# Patient Record
Sex: MALE | Race: WHITE | Employment: OTHER | URBAN - METROPOLITAN AREA
[De-identification: names, ages, dates, MRNs, and addresses within clinical notes are randomized per-mention and may not be internally consistent; named-entity substitution may affect disease eponyms.]

---

## 2017-02-23 ENCOUNTER — HOSPITAL ENCOUNTER (OUTPATIENT)
Dept: LAB | Age: 67
Discharge: HOME | End: 2017-02-23
Payer: COMMERCIAL

## 2017-02-23 DIAGNOSIS — E11.65 TYPE 2 DIABETES MELLITUS WITH HYPERGLYCEMIA (HCC): ICD-10-CM

## 2017-02-23 LAB
ALT SERPL-CCNC: 18 U/L (ref 12–65)
ANION GAP BLD CALC-SCNC: 9 MMOL/L (ref 7–16)
AST SERPL W P-5'-P-CCNC: 1 U/L (ref 15–37)
BUN SERPL-MCNC: 17 MG/DL (ref 8–23)
CALCIUM SERPL-MCNC: 9.4 MG/DL (ref 8.3–10.4)
CHLORIDE SERPL-SCNC: 103 MMOL/L (ref 98–107)
CHOLEST SERPL-MCNC: 302 MG/DL
CO2 SERPL-SCNC: 28 MMOL/L (ref 21–32)
CREAT SERPL-MCNC: 1.12 MG/DL (ref 0.8–1.5)
EST. AVERAGE GLUCOSE BLD GHB EST-MCNC: 278 MG/DL
GLUCOSE SERPL-MCNC: 182 MG/DL (ref 65–100)
HBA1C MFR BLD: 11.3 % (ref 4.8–6)
HDLC SERPL-MCNC: 36 MG/DL (ref 40–60)
HDLC SERPL: 8.4 {RATIO}
LDLC SERPL CALC-MCNC: ABNORMAL MG/DL
LDLC SERPL DIRECT ASSAY-MCNC: 115 MG/DL
LIPID PROFILE,FLP: ABNORMAL
POTASSIUM SERPL-SCNC: 4.4 MMOL/L (ref 3.5–5.1)
SODIUM SERPL-SCNC: 140 MMOL/L (ref 136–145)
TRIGL SERPL-MCNC: 705 MG/DL (ref 35–150)
VLDLC SERPL CALC-MCNC: 141 MG/DL (ref 7–27)

## 2017-02-23 PROCEDURE — 84460 ALANINE AMINO (ALT) (SGPT): CPT | Performed by: INTERNAL MEDICINE

## 2017-02-23 PROCEDURE — 83721 ASSAY OF BLOOD LIPOPROTEIN: CPT | Performed by: INTERNAL MEDICINE

## 2017-02-23 PROCEDURE — 80048 BASIC METABOLIC PNL TOTAL CA: CPT | Performed by: INTERNAL MEDICINE

## 2017-02-23 PROCEDURE — 84450 TRANSFERASE (AST) (SGOT): CPT | Performed by: INTERNAL MEDICINE

## 2017-02-23 PROCEDURE — 83036 HEMOGLOBIN GLYCOSYLATED A1C: CPT | Performed by: INTERNAL MEDICINE

## 2017-02-23 PROCEDURE — 36415 COLL VENOUS BLD VENIPUNCTURE: CPT | Performed by: INTERNAL MEDICINE

## 2017-02-23 PROCEDURE — 80061 LIPID PANEL: CPT | Performed by: INTERNAL MEDICINE

## 2017-06-05 ENCOUNTER — HOSPITAL ENCOUNTER (OUTPATIENT)
Dept: LAB | Age: 67
Discharge: HOME | End: 2017-06-05
Attending: INTERNAL MEDICINE
Payer: COMMERCIAL

## 2017-06-05 DIAGNOSIS — E11.65 TYPE 2 DIABETES MELLITUS WITH HYPERGLYCEMIA, WITH LONG-TERM CURRENT USE OF INSULIN (HCC): ICD-10-CM

## 2017-06-05 DIAGNOSIS — E78.2 ELEVATED TRIGLYCERIDES WITH HIGH CHOLESTEROL: ICD-10-CM

## 2017-06-05 DIAGNOSIS — Z79.4 TYPE 2 DIABETES MELLITUS WITH HYPERGLYCEMIA, WITH LONG-TERM CURRENT USE OF INSULIN (HCC): ICD-10-CM

## 2017-06-05 LAB
ALT SERPL-CCNC: 33 U/L (ref 12–65)
ANION GAP BLD CALC-SCNC: 11 MMOL/L (ref 7–16)
AST SERPL W P-5'-P-CCNC: 16 U/L (ref 15–37)
BUN SERPL-MCNC: 21 MG/DL (ref 8–23)
CALCIUM SERPL-MCNC: 9 MG/DL (ref 8.3–10.4)
CHLORIDE SERPL-SCNC: 99 MMOL/L (ref 98–107)
CHOLEST SERPL-MCNC: 353 MG/DL
CO2 SERPL-SCNC: 29 MMOL/L (ref 21–32)
CREAT SERPL-MCNC: 0.99 MG/DL (ref 0.8–1.5)
EST. AVERAGE GLUCOSE BLD GHB EST-MCNC: 220 MG/DL
GLUCOSE SERPL-MCNC: 209 MG/DL (ref 65–100)
HBA1C MFR BLD: 9.3 % (ref 4.8–6)
HDLC SERPL-MCNC: 43 MG/DL (ref 40–60)
HDLC SERPL: 8.2 {RATIO}
LDLC SERPL CALC-MCNC: ABNORMAL MG/DL
LIPID PROFILE,FLP: ABNORMAL
POTASSIUM SERPL-SCNC: 4.6 MMOL/L (ref 3.5–5.1)
SODIUM SERPL-SCNC: 139 MMOL/L (ref 136–145)
TRIGL SERPL-MCNC: 1091 MG/DL (ref 35–150)
VLDLC SERPL CALC-MCNC: ABNORMAL MG/DL (ref 6–23)

## 2017-06-05 PROCEDURE — 80061 LIPID PANEL: CPT | Performed by: INTERNAL MEDICINE

## 2017-06-05 PROCEDURE — 84450 TRANSFERASE (AST) (SGOT): CPT | Performed by: INTERNAL MEDICINE

## 2017-06-05 PROCEDURE — 83036 HEMOGLOBIN GLYCOSYLATED A1C: CPT | Performed by: INTERNAL MEDICINE

## 2017-06-05 PROCEDURE — 80048 BASIC METABOLIC PNL TOTAL CA: CPT | Performed by: INTERNAL MEDICINE

## 2017-06-05 PROCEDURE — 36415 COLL VENOUS BLD VENIPUNCTURE: CPT | Performed by: INTERNAL MEDICINE

## 2017-06-05 PROCEDURE — 84460 ALANINE AMINO (ALT) (SGPT): CPT | Performed by: INTERNAL MEDICINE

## 2017-06-12 PROBLEM — I15.2 HYPERTENSION DUE TO ENDOCRINE DISORDER: Status: ACTIVE | Noted: 2017-06-12

## 2017-06-12 PROBLEM — E78.2 MIXED DYSLIPIDEMIA: Status: ACTIVE | Noted: 2017-06-12

## 2017-09-06 ENCOUNTER — HOSPITAL ENCOUNTER (OUTPATIENT)
Dept: LAB | Age: 67
Discharge: HOME | End: 2017-09-06
Attending: INTERNAL MEDICINE
Payer: COMMERCIAL

## 2017-09-06 DIAGNOSIS — E11.65 TYPE 2 DIABETES MELLITUS WITH HYPERGLYCEMIA, WITH LONG-TERM CURRENT USE OF INSULIN (HCC): ICD-10-CM

## 2017-09-06 DIAGNOSIS — Z79.4 TYPE 2 DIABETES MELLITUS WITH HYPERGLYCEMIA, WITH LONG-TERM CURRENT USE OF INSULIN (HCC): ICD-10-CM

## 2017-09-06 DIAGNOSIS — E78.2 MIXED DYSLIPIDEMIA: ICD-10-CM

## 2017-09-06 LAB
ALBUMIN SERPL-MCNC: 3.7 G/DL (ref 3.2–4.6)
ALBUMIN/GLOB SERPL: 0.8 {RATIO} (ref 1.2–3.5)
ALP SERPL-CCNC: 75 U/L (ref 50–136)
ALT SERPL-CCNC: 30 U/L (ref 12–65)
ANION GAP SERPL CALC-SCNC: 11 MMOL/L (ref 7–16)
AST SERPL-CCNC: 13 U/L (ref 15–37)
BILIRUB SERPL-MCNC: 0.7 MG/DL (ref 0.2–1.1)
BUN SERPL-MCNC: 18 MG/DL (ref 8–23)
CALCIUM SERPL-MCNC: 9.4 MG/DL (ref 8.3–10.4)
CHLORIDE SERPL-SCNC: 102 MMOL/L (ref 98–107)
CHOLEST SERPL-MCNC: 353 MG/DL
CO2 SERPL-SCNC: 28 MMOL/L (ref 21–32)
CREAT SERPL-MCNC: 1.19 MG/DL (ref 0.8–1.5)
EST. AVERAGE GLUCOSE BLD GHB EST-MCNC: 240 MG/DL
GLOBULIN SER CALC-MCNC: 4.6 G/DL (ref 2.3–3.5)
GLUCOSE SERPL-MCNC: 206 MG/DL (ref 65–100)
HBA1C MFR BLD: 10 % (ref 4.8–6)
HDLC SERPL-MCNC: 38 MG/DL (ref 40–60)
HDLC SERPL: 9.3 {RATIO}
LDLC SERPL CALC-MCNC: ABNORMAL MG/DL
LDLC SERPL DIRECT ASSAY-MCNC: 95 MG/DL
LIPID PROFILE,FLP: ABNORMAL
POTASSIUM SERPL-SCNC: 4.6 MMOL/L (ref 3.5–5.1)
PROT SERPL-MCNC: 8.3 G/DL (ref 6.3–8.2)
SODIUM SERPL-SCNC: 141 MMOL/L (ref 136–145)
TRIGL SERPL-MCNC: 999 MG/DL (ref 35–150)
VLDLC SERPL CALC-MCNC: 199.8 MG/DL (ref 6–23)

## 2017-09-06 PROCEDURE — 36415 COLL VENOUS BLD VENIPUNCTURE: CPT | Performed by: INTERNAL MEDICINE

## 2017-09-06 PROCEDURE — 83721 ASSAY OF BLOOD LIPOPROTEIN: CPT | Performed by: INTERNAL MEDICINE

## 2017-09-06 PROCEDURE — 80053 COMPREHEN METABOLIC PANEL: CPT | Performed by: INTERNAL MEDICINE

## 2017-09-06 PROCEDURE — 83036 HEMOGLOBIN GLYCOSYLATED A1C: CPT | Performed by: INTERNAL MEDICINE

## 2017-09-06 PROCEDURE — 80061 LIPID PANEL: CPT | Performed by: INTERNAL MEDICINE

## 2017-09-25 ENCOUNTER — HOSPITAL ENCOUNTER (OUTPATIENT)
Dept: SLEEP MEDICINE | Age: 67
Discharge: HOME | End: 2017-09-25
Payer: COMMERCIAL

## 2017-09-25 PROCEDURE — 95811 POLYSOM 6/>YRS CPAP 4/> PARM: CPT

## 2017-09-26 PROBLEM — Z12.11 ENCOUNTER FOR SCREENING COLONOSCOPY: Status: ACTIVE | Noted: 2017-09-26

## 2017-10-16 ENCOUNTER — HOSPITAL ENCOUNTER (OUTPATIENT)
Age: 67
Setting detail: OUTPATIENT SURGERY
Discharge: HOME | End: 2017-10-16
Attending: SURGERY | Admitting: SURGERY
Payer: COMMERCIAL

## 2017-10-16 VITALS
OXYGEN SATURATION: 95 % | TEMPERATURE: 97.8 F | DIASTOLIC BLOOD PRESSURE: 77 MMHG | BODY MASS INDEX: 28.93 KG/M2 | HEIGHT: 66 IN | WEIGHT: 180 LBS | RESPIRATION RATE: 16 BRPM | SYSTOLIC BLOOD PRESSURE: 125 MMHG | HEART RATE: 70 BPM

## 2017-10-16 LAB — GLUCOSE BLD STRIP.AUTO-MCNC: 106 MG/DL (ref 65–100)

## 2017-10-16 PROCEDURE — 99153 MOD SED SAME PHYS/QHP EA: CPT | Performed by: SURGERY

## 2017-10-16 PROCEDURE — G0500 MOD SEDAT ENDO SERVICE >5YRS: HCPCS | Performed by: SURGERY

## 2017-10-16 PROCEDURE — 74011250636 HC RX REV CODE- 250/636

## 2017-10-16 PROCEDURE — 76040000019: Performed by: SURGERY

## 2017-10-16 PROCEDURE — 74011250636 HC RX REV CODE- 250/636: Performed by: SURGERY

## 2017-10-16 PROCEDURE — 82962 GLUCOSE BLOOD TEST: CPT

## 2017-10-16 RX ORDER — FLUMAZENIL 0.1 MG/ML
0.2 INJECTION INTRAVENOUS
Status: DISCONTINUED | OUTPATIENT
Start: 2017-10-16 | End: 2017-10-16 | Stop reason: HOSPADM

## 2017-10-16 RX ORDER — SODIUM CHLORIDE 0.9 % (FLUSH) 0.9 %
5-10 SYRINGE (ML) INJECTION EVERY 8 HOURS
Status: DISCONTINUED | OUTPATIENT
Start: 2017-10-16 | End: 2017-10-16 | Stop reason: HOSPADM

## 2017-10-16 RX ORDER — SODIUM CHLORIDE 9 MG/ML
25 INJECTION, SOLUTION INTRAVENOUS CONTINUOUS
Status: DISCONTINUED | OUTPATIENT
Start: 2017-10-16 | End: 2017-10-16 | Stop reason: HOSPADM

## 2017-10-16 RX ORDER — FENTANYL CITRATE 50 UG/ML
50 INJECTION, SOLUTION INTRAMUSCULAR; INTRAVENOUS
Status: DISCONTINUED | OUTPATIENT
Start: 2017-10-16 | End: 2017-10-16 | Stop reason: HOSPADM

## 2017-10-16 RX ORDER — MIDAZOLAM HYDROCHLORIDE 1 MG/ML
.25-5 INJECTION, SOLUTION INTRAMUSCULAR; INTRAVENOUS
Status: DISCONTINUED | OUTPATIENT
Start: 2017-10-16 | End: 2017-10-16 | Stop reason: HOSPADM

## 2017-10-16 RX ORDER — SODIUM CHLORIDE 0.9 % (FLUSH) 0.9 %
5-10 SYRINGE (ML) INJECTION AS NEEDED
Status: DISCONTINUED | OUTPATIENT
Start: 2017-10-16 | End: 2017-10-16 | Stop reason: HOSPADM

## 2017-10-16 RX ORDER — NALOXONE HYDROCHLORIDE 0.4 MG/ML
0.2 INJECTION, SOLUTION INTRAMUSCULAR; INTRAVENOUS; SUBCUTANEOUS
Status: DISCONTINUED | OUTPATIENT
Start: 2017-10-16 | End: 2017-10-16 | Stop reason: HOSPADM

## 2017-10-16 RX ADMIN — FENTANYL CITRATE 50 MCG: 50 INJECTION, SOLUTION INTRAMUSCULAR; INTRAVENOUS at 08:07

## 2017-10-16 RX ADMIN — MIDAZOLAM HYDROCHLORIDE 1 MG: 1 INJECTION, SOLUTION INTRAMUSCULAR; INTRAVENOUS at 08:07

## 2017-10-16 RX ADMIN — MIDAZOLAM HYDROCHLORIDE 2 MG: 1 INJECTION, SOLUTION INTRAMUSCULAR; INTRAVENOUS at 08:04

## 2017-10-16 RX ADMIN — SODIUM CHLORIDE 25 ML/HR: 900 INJECTION, SOLUTION INTRAVENOUS at 07:37

## 2017-10-16 RX ADMIN — FENTANYL CITRATE 50 MCG: 50 INJECTION, SOLUTION INTRAMUSCULAR; INTRAVENOUS at 08:05

## 2017-10-16 NOTE — DISCHARGE INSTRUCTIONS
Gastrointestinal Colonoscopy/Flexible Sigmoidoscopy - Lower Exam Discharge Instructions  1. Call Dr. Harry Hernandez for any problems or questions. 2. Contact the doctors office for follow up appointment as directed  3. Medication may cause drowsiness for several hours, therefore, do not drive or operate machinery for remainder of the day. 4. No alcohol today. 5. Ordinarily, you may resume regular diet and activity after exam unless otherwise specified by your physician. 6. Because of air put into your colon during exam, you may experience some abdominal distension, relieved by the passage of gas, for several hours. 7. Contact your physician if you have any of the following:  a. Excessive amount of bleeding - large amount when having a bowel movement. Occasional specks of blood with bowel movement would not be unusual.  b. Severe abdominal pain  c. Fever or Chills    Any additional instructions:  Repeat in 10 years. Instructions given to Alicia Ponce and other family members.   Instructions given by:  Dr. Harry Hernandez

## 2017-10-16 NOTE — IP AVS SNAPSHOT
[address] 
[phone] Patient: Leo Lawson MRN: [id number] TGO:[id number] You are allergic to the following No active allergies Recent Documentation Height Weight BMI Smoking Status 1.676 m 81.6 kg 29.05 kg/m2 Never Smoker Emergency Contacts Name Discharge Info Relation Home Work Mobile Cammy Enamorado  Spouse [3] [phone] About your hospitalization You were admitted on:  October 16, 2017 You last received care in the:  [facility] You were discharged on:  October 16, 2017 Unit phone number:  [phone] Why you were hospitalized Your primary diagnosis was:  Not on File Providers Seen During Your Hospitalizations Provider Role Specialty Primary office phone Mack De La Vega MD Attending Provider General Surgery [phone] Your Primary Care Physician (PCP) Primary Care Physician Office Phone Office Fax Evan Contehus [phone] Follow-up Information None Your Appointments Friday October 27, 2017  9:40 AM EDT New Patient PSG with PP SLEEP ANEUDY Medeiros [address] ([facility]) [address] [place] [address]  
[phone] Current Discharge Medication List  
  
ASK your doctor about these medications Dose & Instructions Dispensing Information Comments Morning Noon Evening Bedtime  
 aspirin delayed-release 81 mg tablet Your last dose was: Your next dose is: Take  by mouth daily. Refills:  0  
     
   
   
   
  
 empagliflozin 25 mg tablet Commonly known as:  Domingo An Your last dose was: Your next dose is:    
   
   
 Dose:  25 mg Take 1 Tab by mouth daily. Quantity:  30 Tab Refills:  5 fenofibrate micronized 134 mg capsule Commonly known as:  LOFIBRA Your last dose was: Your next dose is:    
   
   
 Dose:  134 mg Take 1 Cap by mouth every morning. Quantity:  90 Cap Refills:  3  
     
   
   
   
  
 glucose blood VI test strips strip Commonly known as:  Yoan Altman Your last dose was: Your next dose is:    
   
   
 #100 per box, 3 boxes/3 month supply, Check BS TID, E11.65 Quantity:  300 Strip Refills:  3  
     
   
   
   
  
 insulin aspart 100 unit/mL Inpn Commonly known as:  Yoselyn Juares Your last dose was: Your next dose is:    
   
   
 5 units prior to large meals as directed  Indications: Hyperglycemia Quantity:  3 Pen Refills:  5  
     
   
   
   
  
 insulin degludec 200 unit/mL (3 mL) Inpn Commonly known as:  Alexander Rogerson U-200 Your last dose was: Your next dose is:    
   
   
 Dose:  40 Units 40 Units by SubCUTAneous route daily. Quantity:  50 Units Refills:  5 Insulin Needles (Disposable) 32 gauge x 5/32\" Ndle Your last dose was: Your next dose is:    
   
   
 Dose:  100 Each  
100 Each by Does Not Apply route five (5) times daily. e11.9 Quantity:  400 Each Refills:  3 Lancets Misc Commonly known as:  ONETOUCH ULTRASOFT LANCETS Your last dose was: Your next dose is:    
   
   
 Check blood sugar twice daily, E11.9 Quantity:  100 Each Refills:  5  
     
   
   
   
  
 levothyroxine 112 mcg tablet Commonly known as:  SYNTHROID Your last dose was: Your next dose is:    
   
   
 Dose:  0.5 mcg Take 0.5 Tabs by mouth Daily (before breakfast). Quantity:  90 Tab Refills:  3  
     
   
   
   
  
 losartan 50 mg tablet Commonly known as:  COZAAR Your last dose was: Your next dose is:    
   
   
 Dose:  50 mg Take 1 Tab by mouth daily. Quantity:  30 Tab Refills:  5  
     
   
   
   
  
 metoprolol succinate 50 mg XL tablet Commonly known as:  TOPROL-XL Your last dose was: Your next dose is:    
   
   
 Dose:  50 mg Take 1 Tab by mouth daily. Quantity:  90 Tab Refills:  3  
     
   
   
   
  
 omega-3 acid ethyl esters 1 gram capsule Commonly known as:  Leanor Nichol Your last dose was: Your next dose is:    
   
   
 Dose:  2 g Take 2 Caps by mouth daily. Indications: hypertriglyceridemia Quantity:  180 Cap Refills:  1  
     
   
   
   
  
 omeprazole 20 mg capsule Commonly known as:  PRILOSEC Your last dose was: Your next dose is:    
   
   
 Dose:  20 mg Take 1 Cap by mouth daily. Quantity:  90 Cap Refills:  3  
     
   
   
   
  
 pravastatin 40 mg tablet Commonly known as:  PRAVACHOL Your last dose was: Your next dose is:    
   
   
 Dose:  40 mg Take 1 Tab by mouth nightly. Quantity:  90 Tab Refills:  3 Discharge Instructions Gastrointestinal Colonoscopy/Flexible Sigmoidoscopy - Lower Exam Discharge Instructions 1. Call Dr. Lisa Trejo for any problems or questions. 2. Contact the doctors office for follow up appointment as directed 3. Medication may cause drowsiness for several hours, therefore, do not drive or operate machinery for remainder of the day. 4. No alcohol today. 5. Ordinarily, you may resume regular diet and activity after exam unless otherwise specified by your physician. 6. Because of air put into your colon during exam, you may experience some abdominal distension, relieved by the passage of gas, for several hours. 7. Contact your physician if you have any of the following: 
a. Excessive amount of bleeding  large amount when having a bowel movement. Occasional specks of blood with bowel movement would not be unusual. 
b. Severe abdominal pain 
c. Fever or Chills Any additional instructions:  Repeat in 10 years. Instructions given to Sheree Ma and other family members. Instructions given by:  Dr. FISCHER [facility] Discharge Orders None Introducing [facility]! Dear Simón Knox: Thank you for requesting a A[employer]ame account. Our records indicate that you already have an active Adsame account. You can access your account anytime at [email or web address] Did you know that you can access your hospital and ER discharge instructions at any time in Adsame? You can also review all of your test results from your hospital stay or ER visit. Additional Information If you have questions, please visit the Frequently Asked Questions section of the Adsame website at [email or web address]/. Remember, Adsame is NOT to be used for urgent needs. For medical emergencies, dial 911. Now available from your iPhone and Android! General Information Please provide this summary of care documentation to your next provider. Patient Signature:  ____________________________________________________________ Date:  ____________________________________________________________  
  
Earnstine Hi Provider Signature:  ____________________________________________________________ Date:  ____________________________________________________________

## 2017-10-16 NOTE — ROUTINE PROCESS
Discharge instructions given to wife. IV discontinued with skin c/d/i. Pt passed flatus and tolerating liquids well. Pt ready for discharge.

## 2017-10-16 NOTE — PROCEDURES
[address] [facility]  [phone]  Colonoscopy Report  Daron Salas    Admit date: 10/16/2017    MRN: [id number]     : 1950     Age: 78 y.o.          10/16/2017 8:20 AM    Screening colonoscopy     Post Procedure Diagnosis: sigmoid diverticulosis, otherwise normal colon    Indications:  Pt was sent by their primary care physician for screening colonoscopy. Risks and benefits of the procedure were discussed, and consent was obtained. Procedure:  Pt was brought back to the endoscopy suite, placed in left lateral decubitus position, and IV sedation was administered. 3mg versed and 100mcg fentnyl were administered intravenously, and vital signs were monitored continuously during this time. Digital rectal exam was performed with no findings. Colonoscope was inserted into the anus, and advanced with insufflation around the entire length of the colon to the ileocecal valve. The valve was positively identified with the visual landmarks and palpation in the RLQ. The scope was then withdrawn slowly, inspecting the mucosa for any irregularities. Findings included sigmoid diverticulosis. Colon was otherwise normal .  Insufflation was suctioned out at the completion of the procedure, and the patient was transferred to the recovery area in stable condition. There were no complications. Pt tolerated the procedure well. Recommend repeat exam in 10 years      Specimens none    Estimated Blood Loss: 0-minimum.     Alex Almanzar MD

## 2017-10-16 NOTE — H&P
Harveysloan Camilo    10/16/2017      Subjective:     Patient is a 78 y.o. male who was sent by their primary care physician for screening colonoscopy. Pt denies any symptoms of abdominal pain, change in bowel habits, blood per rectum, unexplained weight loss, or other symptoms that would be of concern for colon cancer. Patient Active Problem List    Diagnosis Date Noted    Encounter for screening colonoscopy 2017    Hypertension due to endocrine disorder 2017    Mixed dyslipidemia 2017    Acquired hypothyroidism 2016    Gastroesophageal reflux disease 2016    Elevated triglycerides with high cholesterol 2016    Hypertension     Type 2 diabetes mellitus with hyperglycemia ([facility] [address] 10/28/2015    Shortness of breath 10/28/2015     Past Medical History:   Diagnosis Date    Acquired hypothyroidism 2016    Dehydration 2009    CAMERON (dyspnea on exertion) 10/28/2015    SOB    Fatigue     other    Fever 2009    Hyperlipidemia     Hypertension     essential    Joint pain, elbow 10/28/2015    Other ill-defined conditions(799.89)     cholesterol    Type 2 diabetes mellitus with hyperglycemia ([facility] [address] 10/28/2015      Past Surgical History:   Procedure Laterality Date    HX HEENT      tonsilectomy      Prior to Admission Medications   Prescriptions Last Dose Informant Patient Reported? Taking? Insulin Needles, Disposable, 32 gauge x \" ndle   No No   Si Each by Does Not Apply route five (5) times daily. e11.9   Lancets (ONETOUCH ULTRASOFT LANCETS) misc   No No   Sig: Check blood sugar twice daily, E11.9   aspirin delayed-release 81 mg tablet 10/15/2017 at Unknown time  Yes Yes   Sig: Take  by mouth daily. empagliflozin (JARDIANCE) 25 mg tablet 10/15/2017 at Unknown time  No Yes   Sig: Take 1 Tab by mouth daily. fenofibrate micronized (LOFIBRA) 134 mg capsule 10/15/2017 at Unknown time  No Yes   Sig: Take 1 Cap by mouth every morning.    glucose blood VI test strips (ONETOUCH VERIO) strip   No No   Sig: #100 per box, 3 boxes/3 month supply, Check BS TID, E11.65   insulin aspart (NOVOLOG) 100 unit/mL inpn 10/15/2017 at Unknown time  No Yes   Si units prior to large meals as directed  Indications: Hyperglycemia   Patient taking differently: 10 units prior to large meals as directed  Indications: Hyperglycemia   insulin degludec (TRESIBA FLEXTOUCH U-200) 200 unit/mL (3 mL) inpn 10/15/2017 at Unknown time  No Yes   Si Units by SubCUTAneous route daily. Patient taking differently: 60 Units by SubCUTAneous route daily. levothyroxine (SYNTHROID) 112 mcg tablet 10/15/2017 at Unknown time  No Yes   Sig: Take 0.5 Tabs by mouth Daily (before breakfast). losartan (COZAAR) 50 mg tablet 10/15/2017 at Unknown time  No Yes   Sig: Take 1 Tab by mouth daily. metoprolol succinate (TOPROL-XL) 50 mg XL tablet 10/15/2017 at Unknown time  No Yes   Sig: Take 1 Tab by mouth daily. omega-3 acid ethyl esters (LOVAZA) 1 gram capsule 10/15/2017 at Unknown time  No Yes   Sig: Take 2 Caps by mouth daily. Indications: hypertriglyceridemia   omeprazole (PRILOSEC) 20 mg capsule 10/15/2017 at Unknown time  No Yes   Sig: Take 1 Cap by mouth daily. pravastatin (PRAVACHOL) 40 mg tablet 10/15/2017 at Unknown time  No Yes   Sig: Take 1 Tab by mouth nightly. Facility-Administered Medications: None     No Known Allergies   Social History   Substance Use Topics    Smoking status: Never Smoker    Smokeless tobacco: Never Used    Alcohol use No      Social History     Social History Narrative     Family History   Problem Relation Age of Onset    Heart Disease Father         No current facility-administered medications for this encounter. Review of Systems  A comprehensive review of systems was negative except for that written in the HPI.     Objective:     Vitals:    10/16/17 0707   Temp: 97.8 °F (36.6 °C)   Weight: 180 lb (81.6 kg)   Height: 5' 6\" (1.676 m)     PHYSICAL EXAM     Gen- the patient is well developed and in no acute distress  HEENT- PERRL, EOMI, no scleral icterus       nose without alar flaring or epistaxis                  oral muscosa moist without cyanosis  Neck- no JVD or retractions  Lungs-clear  Heart- RRR without M,G,R  Abd- soft and non-tender; with positive bowel sounds. Ext- warm without cyanosis. There is no lower leg edema. Skin- no jaundice or rashes, no wounds   Neuro- alert and oriented x 3. No gross sensorimotor deficits are present. Plan:     Age appropriate screening colonoscopy. I have discussed the risks, benefits and alternatives of surgery. Pt understands and wishes to proceed.   Consent obtained           Tremayne Ng MD

## 2017-10-17 ENCOUNTER — PATIENT OUTREACH (OUTPATIENT)
Dept: OTHER | Age: 67
End: 2017-10-17

## 2017-10-17 NOTE — PROGRESS NOTES
Patient on report as eligible for Case Management. Left discreet message on voicemail with this CM contact information. Will attempt to contact again to offer [phone] Management services.

## 2017-10-17 NOTE — PROGRESS NOTES
Transition Of Care Note    Patient discharged from [address] [place] [address] for colonoscopy. Medical History:     Past Medical History:   Diagnosis Date    Acquired hypothyroidism 2016    Dehydration 2009    CAMERON (dyspnea on exertion) 10/28/2015    SOB    Fatigue     other    Fever 2009    Hyperlipidemia     Hypertension     essential    Joint pain, elbow 10/28/2015    Other ill-defined conditions(799.89)     cholesterol    Type 2 diabetes mellitus with hyperglycemia ([facility] [address] 10/28/2015       Patient returned my call. Verified  and zip code with patient as identifiers. Performed post hospital discharge assessment. Provided introduction to self, and explanation of the Nurse Care Manager role. Patient states he is doing well today, been out fishing in his pond. Denies any nausea, vomiting, pain or diarrhea today. Appetite is good and he is eating his normal diet. Medication:   Performed medication reconciliation with patient, and patient verbalizes understanding of administration of home medications. There were no barriers to obtaining medications identified at this time. Support system:  patient    Discharge Instructions :  Reviewed discharge instructions with patient. Patient verbalizes understanding of discharge instructions and follow-up care. Red Flags:  Call your doctor now or seek immediate medical care if:  · Your stools are black and tarlike. · Your stools have streaks of blood, but you did not have a biopsy or any polyps removed. · You have belly pain, or your belly is swollen and firm. · You vomit. · You have a fever. · You are very dizzy.       Advance Care Planning:   Patient was offered the opportunity to discuss advance care planning:  yes     Does patient have an Advance Directive:  yes   If no, did you provide information on Caring Connections?  no     PCP/Specialist follow up: Patient scheduled to follow up with Bon Delatorre MD on  month.  Reviewed red flags with patient, and patient verbalizes understanding. Patient given an opportunity to ask questions. No other clinical/social/functional needs noted. The patient agrees to contact the PCP office for questions related to their healthcare. The patient expressed thanks, offered no additional questions and ended the call.

## 2017-10-24 ENCOUNTER — HOSPITAL ENCOUNTER (OUTPATIENT)
Dept: GENERAL RADIOLOGY | Age: 67
Discharge: HOME | End: 2017-10-24
Attending: INTERNAL MEDICINE
Payer: COMMERCIAL

## 2017-10-24 DIAGNOSIS — R06.09 DOE (DYSPNEA ON EXERTION): ICD-10-CM

## 2017-10-24 PROCEDURE — 71020 XR CHEST PA LAT: CPT

## 2017-10-26 ENCOUNTER — PATIENT OUTREACH (OUTPATIENT)
Dept: OTHER | Age: 67
End: 2017-10-26

## 2017-10-26 NOTE — PROGRESS NOTES
Care Manager contacted the patient by telephone in follow up. Verified  and zip code with patient as identifiers. Patient states he is back to his usual activities. Denies any problems with his bowels at this time. States his appetite is good and is eating his usual foods. Denies any additional needs or concerns at this time. Will follow up in two weeks, and if no identified or requested CM needs, will close this episode of ELMO.

## 2017-10-27 PROBLEM — G47.33 OSA (OBSTRUCTIVE SLEEP APNEA): Status: ACTIVE | Noted: 2017-10-27

## 2017-10-27 PROBLEM — R09.02 HYPOXIA: Status: ACTIVE | Noted: 2017-10-27

## 2017-10-27 PROBLEM — G47.61 PERIODIC LIMB MOVEMENT DISORDER: Status: ACTIVE | Noted: 2017-10-27

## 2017-10-27 PROBLEM — G47.10 HYPERSOMNIA: Status: ACTIVE | Noted: 2017-10-27

## 2017-11-15 ENCOUNTER — PATIENT OUTREACH (OUTPATIENT)
Dept: OTHER | Age: 67
End: 2017-11-15

## 2017-11-15 NOTE — PROGRESS NOTES
Resolving current episode of care. Transitions of care complete. No further ED/UC or hospital admissions within 30 days post discharge. Goal met as there was no return of symptoms. Patient attended follow-up appointments as directed. No outreach from patient to [address] [place] [address].

## 2017-12-06 ENCOUNTER — HOSPITAL ENCOUNTER (OUTPATIENT)
Dept: LAB | Age: 67
Discharge: HOME | End: 2017-12-06
Attending: INTERNAL MEDICINE
Payer: COMMERCIAL

## 2017-12-06 DIAGNOSIS — Z12.5 PROSTATE CANCER SCREENING: ICD-10-CM

## 2017-12-06 DIAGNOSIS — Z79.4 TYPE 2 DIABETES MELLITUS WITH HYPERGLYCEMIA, WITH LONG-TERM CURRENT USE OF INSULIN (HCC): ICD-10-CM

## 2017-12-06 DIAGNOSIS — E11.65 TYPE 2 DIABETES MELLITUS WITH HYPERGLYCEMIA, WITH LONG-TERM CURRENT USE OF INSULIN (HCC): ICD-10-CM

## 2017-12-06 DIAGNOSIS — E78.2 ELEVATED TRIGLYCERIDES WITH HIGH CHOLESTEROL: ICD-10-CM

## 2017-12-06 LAB
ALBUMIN SERPL-MCNC: 3.8 G/DL (ref 3.2–4.6)
ALBUMIN/GLOB SERPL: 0.9 {RATIO} (ref 1.2–3.5)
ALP SERPL-CCNC: 55 U/L (ref 50–136)
ALT SERPL-CCNC: 32 U/L (ref 12–65)
ANION GAP SERPL CALC-SCNC: 11 MMOL/L (ref 7–16)
AST SERPL-CCNC: 21 U/L (ref 15–37)
BASOPHILS # BLD: 0 K/UL (ref 0–0.2)
BASOPHILS NFR BLD: 0 % (ref 0–2)
BILIRUB SERPL-MCNC: 0.4 MG/DL (ref 0.2–1.1)
BUN SERPL-MCNC: 24 MG/DL (ref 8–23)
CALCIUM SERPL-MCNC: 9.5 MG/DL (ref 8.3–10.4)
CHLORIDE SERPL-SCNC: 102 MMOL/L (ref 98–107)
CHOLEST SERPL-MCNC: 261 MG/DL
CO2 SERPL-SCNC: 29 MMOL/L (ref 21–32)
CREAT SERPL-MCNC: 1.18 MG/DL (ref 0.8–1.5)
CREAT UR-MCNC: 103 MG/DL
DIFFERENTIAL METHOD BLD: NORMAL
EOSINOPHIL # BLD: 0.1 K/UL (ref 0–0.8)
EOSINOPHIL NFR BLD: 2 % (ref 0.5–7.8)
ERYTHROCYTE [DISTWIDTH] IN BLOOD BY AUTOMATED COUNT: 13.2 % (ref 11.9–14.6)
EST. AVERAGE GLUCOSE BLD GHB EST-MCNC: 232 MG/DL
GLOBULIN SER CALC-MCNC: 4.2 G/DL (ref 2.3–3.5)
GLUCOSE SERPL-MCNC: 129 MG/DL (ref 65–100)
HBA1C MFR BLD: 9.7 % (ref 4.8–6)
HCT VFR BLD AUTO: 45.1 % (ref 41.1–50.3)
HDLC SERPL-MCNC: 40 MG/DL (ref 40–60)
HDLC SERPL: 6.5 {RATIO}
HGB BLD-MCNC: 14.4 G/DL (ref 13.6–17.2)
IMM GRANULOCYTES # BLD: 0 K/UL (ref 0–0.5)
IMM GRANULOCYTES NFR BLD AUTO: 0 % (ref 0–5)
LDLC SERPL CALC-MCNC: 159.2 MG/DL
LIPID PROFILE,FLP: ABNORMAL
LYMPHOCYTES # BLD: 2.1 K/UL (ref 0.5–4.6)
LYMPHOCYTES NFR BLD: 36 % (ref 13–44)
MCH RBC QN AUTO: 28.1 PG (ref 26.1–32.9)
MCHC RBC AUTO-ENTMCNC: 31.9 G/DL (ref 31.4–35)
MCV RBC AUTO: 88.1 FL (ref 79.6–97.8)
MICROALBUMIN UR-MCNC: 0.54 MG/DL
MICROALBUMIN/CREAT UR-RTO: 5 MG/G
MONOCYTES # BLD: 0.4 K/UL (ref 0.1–1.3)
MONOCYTES NFR BLD: 7 % (ref 4–12)
NEUTS SEG # BLD: 3.3 K/UL (ref 1.7–8.2)
NEUTS SEG NFR BLD: 55 % (ref 43–78)
PLATELET # BLD AUTO: 237 K/UL (ref 150–450)
PMV BLD AUTO: 11.1 FL (ref 10.8–14.1)
POTASSIUM SERPL-SCNC: 4.1 MMOL/L (ref 3.5–5.1)
PROT SERPL-MCNC: 8 G/DL (ref 6.3–8.2)
PSA SERPL-MCNC: 0.5 NG/ML
RBC # BLD AUTO: 5.12 M/UL (ref 4.23–5.67)
SODIUM SERPL-SCNC: 142 MMOL/L (ref 136–145)
TRIGL SERPL-MCNC: 309 MG/DL (ref 35–150)
TSH SERPL DL<=0.005 MIU/L-ACNC: 1.54 UIU/ML (ref 0.36–3.74)
VLDLC SERPL CALC-MCNC: 61.8 MG/DL (ref 6–23)
WBC # BLD AUTO: 5.9 K/UL (ref 4.3–11.1)

## 2017-12-06 PROCEDURE — 36415 COLL VENOUS BLD VENIPUNCTURE: CPT | Performed by: INTERNAL MEDICINE

## 2017-12-06 PROCEDURE — 82043 UR ALBUMIN QUANTITATIVE: CPT | Performed by: INTERNAL MEDICINE

## 2017-12-06 PROCEDURE — 80053 COMPREHEN METABOLIC PANEL: CPT | Performed by: INTERNAL MEDICINE

## 2017-12-06 PROCEDURE — 84443 ASSAY THYROID STIM HORMONE: CPT | Performed by: INTERNAL MEDICINE

## 2017-12-06 PROCEDURE — 85025 COMPLETE CBC W/AUTO DIFF WBC: CPT | Performed by: INTERNAL MEDICINE

## 2017-12-06 PROCEDURE — 83036 HEMOGLOBIN GLYCOSYLATED A1C: CPT | Performed by: INTERNAL MEDICINE

## 2017-12-06 PROCEDURE — 80061 LIPID PANEL: CPT | Performed by: INTERNAL MEDICINE

## 2017-12-06 PROCEDURE — 84153 ASSAY OF PSA TOTAL: CPT | Performed by: INTERNAL MEDICINE

## 2017-12-11 PROBLEM — R06.09 DOE (DYSPNEA ON EXERTION): Status: RESOLVED | Noted: 2017-12-11 | Resolved: 2017-12-11

## 2017-12-11 PROBLEM — R06.09 DOE (DYSPNEA ON EXERTION): Status: ACTIVE | Noted: 2017-12-11

## 2018-03-05 ENCOUNTER — HOSPITAL ENCOUNTER (OUTPATIENT)
Dept: LAB | Age: 68
Discharge: HOME | End: 2018-03-05
Attending: INTERNAL MEDICINE
Payer: COMMERCIAL

## 2018-03-05 LAB
ALBUMIN SERPL-MCNC: 3.9 G/DL (ref 3.2–4.6)
ALBUMIN/GLOB SERPL: 1.1 {RATIO} (ref 1.2–3.5)
ALP SERPL-CCNC: 58 U/L (ref 50–136)
ALT SERPL-CCNC: 38 U/L (ref 12–65)
ANION GAP SERPL CALC-SCNC: 11 MMOL/L (ref 7–16)
AST SERPL-CCNC: 24 U/L (ref 15–37)
BASOPHILS # BLD: 0 K/UL (ref 0–0.2)
BASOPHILS NFR BLD: 1 % (ref 0–2)
BILIRUB SERPL-MCNC: 0.4 MG/DL (ref 0.2–1.1)
BUN SERPL-MCNC: 20 MG/DL (ref 8–23)
CALCIUM SERPL-MCNC: 9.6 MG/DL (ref 8.3–10.4)
CHLORIDE SERPL-SCNC: 105 MMOL/L (ref 98–107)
CHOLEST SERPL-MCNC: 344 MG/DL
CO2 SERPL-SCNC: 27 MMOL/L (ref 21–32)
CREAT SERPL-MCNC: 1.12 MG/DL (ref 0.8–1.5)
DIFFERENTIAL METHOD BLD: NORMAL
EOSINOPHIL # BLD: 0.2 K/UL (ref 0–0.8)
EOSINOPHIL NFR BLD: 3 % (ref 0.5–7.8)
ERYTHROCYTE [DISTWIDTH] IN BLOOD BY AUTOMATED COUNT: 13.4 % (ref 11.9–14.6)
EST. AVERAGE GLUCOSE BLD GHB EST-MCNC: 255 MG/DL
GLOBULIN SER CALC-MCNC: 3.5 G/DL (ref 2.3–3.5)
GLUCOSE SERPL-MCNC: 127 MG/DL (ref 65–100)
HBA1C MFR BLD: 10.5 % (ref 4.8–6)
HCT VFR BLD AUTO: 43.8 % (ref 41.1–50.3)
HDLC SERPL-MCNC: 40 MG/DL (ref 40–60)
HDLC SERPL: 8.6 {RATIO}
HGB BLD-MCNC: 14.5 G/DL (ref 13.6–17.2)
IMM GRANULOCYTES # BLD: 0 K/UL (ref 0–0.5)
IMM GRANULOCYTES NFR BLD AUTO: 0 % (ref 0–5)
LDLC SERPL CALC-MCNC: ABNORMAL MG/DL
LDLC SERPL DIRECT ASSAY-MCNC: 105 MG/DL
LIPID PROFILE,FLP: ABNORMAL
LYMPHOCYTES # BLD: 2.1 K/UL (ref 0.5–4.6)
LYMPHOCYTES NFR BLD: 39 % (ref 13–44)
MCH RBC QN AUTO: 28.2 PG (ref 26.1–32.9)
MCHC RBC AUTO-ENTMCNC: 33.1 G/DL (ref 31.4–35)
MCV RBC AUTO: 85.2 FL (ref 79.6–97.8)
MONOCYTES # BLD: 0.4 K/UL (ref 0.1–1.3)
MONOCYTES NFR BLD: 8 % (ref 4–12)
NEUTS SEG # BLD: 2.7 K/UL (ref 1.7–8.2)
NEUTS SEG NFR BLD: 49 % (ref 43–78)
PLATELET # BLD AUTO: 204 K/UL (ref 150–450)
PMV BLD AUTO: 11.6 FL (ref 10.8–14.1)
POTASSIUM SERPL-SCNC: 4.8 MMOL/L (ref 3.5–5.1)
PROT SERPL-MCNC: 7.4 G/DL (ref 6.3–8.2)
RBC # BLD AUTO: 5.14 M/UL (ref 4.23–5.67)
SODIUM SERPL-SCNC: 143 MMOL/L (ref 136–145)
TRIGL SERPL-MCNC: 896 MG/DL (ref 35–150)
TSH SERPL DL<=0.005 MIU/L-ACNC: 1.51 UIU/ML (ref 0.36–3.74)
VLDLC SERPL CALC-MCNC: 179.2 MG/DL (ref 6–23)
WBC # BLD AUTO: 5.4 K/UL (ref 4.3–11.1)

## 2018-03-05 PROCEDURE — 84443 ASSAY THYROID STIM HORMONE: CPT | Performed by: INTERNAL MEDICINE

## 2018-03-05 PROCEDURE — 80053 COMPREHEN METABOLIC PANEL: CPT | Performed by: INTERNAL MEDICINE

## 2018-03-05 PROCEDURE — 83721 ASSAY OF BLOOD LIPOPROTEIN: CPT | Performed by: INTERNAL MEDICINE

## 2018-03-05 PROCEDURE — 80061 LIPID PANEL: CPT | Performed by: INTERNAL MEDICINE

## 2018-03-05 PROCEDURE — 85025 COMPLETE CBC W/AUTO DIFF WBC: CPT | Performed by: INTERNAL MEDICINE

## 2018-03-05 PROCEDURE — 83036 HEMOGLOBIN GLYCOSYLATED A1C: CPT | Performed by: INTERNAL MEDICINE

## 2018-03-05 PROCEDURE — 36415 COLL VENOUS BLD VENIPUNCTURE: CPT | Performed by: INTERNAL MEDICINE

## 2018-06-08 ENCOUNTER — HOSPITAL ENCOUNTER (OUTPATIENT)
Dept: LAB | Age: 68
Discharge: HOME | End: 2018-06-08
Attending: INTERNAL MEDICINE
Payer: COMMERCIAL

## 2018-06-08 DIAGNOSIS — Z79.4 TYPE 2 DIABETES MELLITUS WITH HYPERGLYCEMIA, WITH LONG-TERM CURRENT USE OF INSULIN (HCC): ICD-10-CM

## 2018-06-08 DIAGNOSIS — Z12.5 PROSTATE CANCER SCREENING: ICD-10-CM

## 2018-06-08 DIAGNOSIS — E11.65 TYPE 2 DIABETES MELLITUS WITH HYPERGLYCEMIA, WITH LONG-TERM CURRENT USE OF INSULIN (HCC): ICD-10-CM

## 2018-06-08 DIAGNOSIS — E78.2 ELEVATED TRIGLYCERIDES WITH HIGH CHOLESTEROL: ICD-10-CM

## 2018-06-08 LAB
ALBUMIN SERPL-MCNC: 3.7 G/DL (ref 3.2–4.6)
ALBUMIN/GLOB SERPL: 1.1 {RATIO} (ref 1.2–3.5)
ALP SERPL-CCNC: 49 U/L (ref 50–136)
ALT SERPL-CCNC: 36 U/L (ref 12–65)
ANION GAP SERPL CALC-SCNC: 7 MMOL/L (ref 7–16)
AST SERPL-CCNC: 22 U/L (ref 15–37)
BASOPHILS # BLD: 0 K/UL (ref 0–0.2)
BASOPHILS NFR BLD: 0 % (ref 0–2)
BILIRUB SERPL-MCNC: 0.5 MG/DL (ref 0.2–1.1)
BUN SERPL-MCNC: 26 MG/DL (ref 8–23)
CALCIUM SERPL-MCNC: 8.9 MG/DL (ref 8.3–10.4)
CHLORIDE SERPL-SCNC: 103 MMOL/L (ref 98–107)
CHOLEST SERPL-MCNC: 325 MG/DL
CO2 SERPL-SCNC: 28 MMOL/L (ref 21–32)
CREAT SERPL-MCNC: 1.19 MG/DL (ref 0.8–1.5)
CREAT UR-MCNC: 100 MG/DL
DIFFERENTIAL METHOD BLD: NORMAL
EOSINOPHIL # BLD: 0.1 K/UL (ref 0–0.8)
EOSINOPHIL NFR BLD: 3 % (ref 0.5–7.8)
ERYTHROCYTE [DISTWIDTH] IN BLOOD BY AUTOMATED COUNT: 13.2 % (ref 11.9–14.6)
EST. AVERAGE GLUCOSE BLD GHB EST-MCNC: 243 MG/DL
GLOBULIN SER CALC-MCNC: 3.4 G/DL (ref 2.3–3.5)
GLUCOSE SERPL-MCNC: 142 MG/DL (ref 65–100)
HBA1C MFR BLD: 10.1 % (ref 4.8–6)
HCT VFR BLD AUTO: 43.3 % (ref 41.1–50.3)
HDLC SERPL-MCNC: 37 MG/DL (ref 40–60)
HDLC SERPL: 8.8 {RATIO}
HGB BLD-MCNC: 14.4 G/DL (ref 13.6–17.2)
IMM GRANULOCYTES # BLD: 0 K/UL (ref 0–0.5)
IMM GRANULOCYTES NFR BLD AUTO: 0 % (ref 0–5)
LDLC SERPL CALC-MCNC: ABNORMAL MG/DL
LDLC SERPL DIRECT ASSAY-MCNC: 104 MG/DL
LIPID PROFILE,FLP: ABNORMAL
LYMPHOCYTES # BLD: 2 K/UL (ref 0.5–4.6)
LYMPHOCYTES NFR BLD: 42 % (ref 13–44)
MCH RBC QN AUTO: 28.1 PG (ref 26.1–32.9)
MCHC RBC AUTO-ENTMCNC: 33.3 G/DL (ref 31.4–35)
MCV RBC AUTO: 84.6 FL (ref 79.6–97.8)
MICROALBUMIN UR-MCNC: 0.91 MG/DL
MICROALBUMIN/CREAT UR-RTO: 9 MG/G
MONOCYTES # BLD: 0.4 K/UL (ref 0.1–1.3)
MONOCYTES NFR BLD: 9 % (ref 4–12)
NEUTS SEG # BLD: 2.2 K/UL (ref 1.7–8.2)
NEUTS SEG NFR BLD: 46 % (ref 43–78)
PLATELET # BLD AUTO: 188 K/UL (ref 150–450)
PMV BLD AUTO: 11.2 FL (ref 10.8–14.1)
POTASSIUM SERPL-SCNC: 4.5 MMOL/L (ref 3.5–5.1)
PROT SERPL-MCNC: 7.1 G/DL (ref 6.3–8.2)
RBC # BLD AUTO: 5.12 M/UL (ref 4.23–5.67)
SODIUM SERPL-SCNC: 138 MMOL/L (ref 136–145)
TRIGL SERPL-MCNC: 952 MG/DL (ref 35–150)
TSH SERPL DL<=0.005 MIU/L-ACNC: 1.56 UIU/ML (ref 0.36–3.74)
VLDLC SERPL CALC-MCNC: 190.4 MG/DL (ref 6–23)
WBC # BLD AUTO: 4.8 K/UL (ref 4.3–11.1)

## 2018-06-08 PROCEDURE — 82043 UR ALBUMIN QUANTITATIVE: CPT | Performed by: INTERNAL MEDICINE

## 2018-06-08 PROCEDURE — 80061 LIPID PANEL: CPT | Performed by: INTERNAL MEDICINE

## 2018-06-08 PROCEDURE — 83721 ASSAY OF BLOOD LIPOPROTEIN: CPT | Performed by: INTERNAL MEDICINE

## 2018-06-08 PROCEDURE — 84443 ASSAY THYROID STIM HORMONE: CPT | Performed by: INTERNAL MEDICINE

## 2018-06-08 PROCEDURE — 83036 HEMOGLOBIN GLYCOSYLATED A1C: CPT | Performed by: INTERNAL MEDICINE

## 2018-06-08 PROCEDURE — 85025 COMPLETE CBC W/AUTO DIFF WBC: CPT | Performed by: INTERNAL MEDICINE

## 2018-06-08 PROCEDURE — 80053 COMPREHEN METABOLIC PANEL: CPT | Performed by: INTERNAL MEDICINE

## 2018-06-08 PROCEDURE — 36415 COLL VENOUS BLD VENIPUNCTURE: CPT | Performed by: INTERNAL MEDICINE

## 2018-10-25 ENCOUNTER — HOSPITAL ENCOUNTER (OUTPATIENT)
Dept: LAB | Age: 68
Discharge: HOME | End: 2018-10-25
Attending: PHYSICIAN ASSISTANT
Payer: COMMERCIAL

## 2018-10-25 LAB
ANION GAP SERPL CALC-SCNC: 9 MMOL/L
BUN SERPL-MCNC: 25 MG/DL (ref 8–23)
CALCIUM SERPL-MCNC: 9.1 MG/DL (ref 8.3–10.4)
CHLORIDE SERPL-SCNC: 105 MMOL/L (ref 98–107)
CO2 SERPL-SCNC: 25 MMOL/L (ref 21–32)
CREAT SERPL-MCNC: 1.2 MG/DL (ref 0.8–1.5)
GLUCOSE SERPL-MCNC: 148 MG/DL (ref 65–100)
MAGNESIUM SERPL-MCNC: 2.1 MG/DL (ref 1.8–2.4)
POTASSIUM SERPL-SCNC: 4.4 MMOL/L (ref 3.5–5.1)
SODIUM SERPL-SCNC: 139 MMOL/L (ref 136–145)

## 2018-10-25 PROCEDURE — 80048 BASIC METABOLIC PNL TOTAL CA: CPT

## 2018-10-25 PROCEDURE — 36415 COLL VENOUS BLD VENIPUNCTURE: CPT

## 2018-10-25 PROCEDURE — 83735 ASSAY OF MAGNESIUM: CPT

## 2018-10-25 NOTE — PROGRESS NOTES
All electrolytes (including magnesium) look fine. I want him to contact me Monday or Tuesday next week after being off Onglyza over the weekend to see if any of his symptoms improve or resolve.

## 2018-11-21 ENCOUNTER — HOSPITAL ENCOUNTER (OUTPATIENT)
Dept: LAB | Age: 68
Discharge: HOME | End: 2018-11-21
Attending: PHYSICIAN ASSISTANT
Payer: COMMERCIAL

## 2018-11-21 LAB
EST. AVERAGE GLUCOSE BLD GHB EST-MCNC: 186 MG/DL
HBA1C MFR BLD: 8.1 %

## 2018-11-21 PROCEDURE — 36415 COLL VENOUS BLD VENIPUNCTURE: CPT

## 2018-11-21 PROCEDURE — 83036 HEMOGLOBIN GLYCOSYLATED A1C: CPT

## 2018-11-21 NOTE — PROGRESS NOTES
Further progress made from 8.9% in September. Patient notified of results. I feel confident and hopeful that with the consistency on current medication regimen that we can hopefully see something < 7.5% at next full 3 month cycle.

## 2019-03-07 ENCOUNTER — HOSPITAL ENCOUNTER (OUTPATIENT)
Dept: LAB | Age: 69
Discharge: HOME | End: 2019-03-07
Payer: COMMERCIAL

## 2019-03-07 DIAGNOSIS — E03.9 ACQUIRED HYPOTHYROIDISM: ICD-10-CM

## 2019-03-07 DIAGNOSIS — Z79.4 UNCONTROLLED TYPE 2 DIABETES MELLITUS WITH HYPERGLYCEMIA, WITH LONG-TERM CURRENT USE OF INSULIN (HCC): ICD-10-CM

## 2019-03-07 DIAGNOSIS — I10 ESSENTIAL HYPERTENSION: ICD-10-CM

## 2019-03-07 DIAGNOSIS — E78.2 MIXED DYSLIPIDEMIA: ICD-10-CM

## 2019-03-07 DIAGNOSIS — K21.9 GASTROESOPHAGEAL REFLUX DISEASE, ESOPHAGITIS PRESENCE NOT SPECIFIED: ICD-10-CM

## 2019-03-07 DIAGNOSIS — E11.65 UNCONTROLLED TYPE 2 DIABETES MELLITUS WITH HYPERGLYCEMIA, WITH LONG-TERM CURRENT USE OF INSULIN (HCC): ICD-10-CM

## 2019-03-07 LAB
ALBUMIN SERPL-MCNC: 3.8 G/DL (ref 3.2–4.6)
ALBUMIN/GLOB SERPL: 1.1 {RATIO}
ALP SERPL-CCNC: 42 U/L (ref 50–136)
ALT SERPL-CCNC: 44 U/L (ref 12–65)
ANION GAP SERPL CALC-SCNC: 5 MMOL/L
AST SERPL-CCNC: 25 U/L (ref 15–37)
BASOPHILS # BLD: 0 K/UL (ref 0–0.2)
BASOPHILS NFR BLD: 1 % (ref 0–2)
BILIRUB SERPL-MCNC: 0.4 MG/DL (ref 0.2–1.1)
BUN SERPL-MCNC: 18 MG/DL (ref 8–23)
CALCIUM SERPL-MCNC: 9.5 MG/DL (ref 8.3–10.4)
CHLORIDE SERPL-SCNC: 106 MMOL/L (ref 98–107)
CHOLEST SERPL-MCNC: 208 MG/DL
CO2 SERPL-SCNC: 28 MMOL/L (ref 21–32)
CREAT SERPL-MCNC: 0.94 MG/DL (ref 0.8–1.5)
DIFFERENTIAL METHOD BLD: NORMAL
EOSINOPHIL # BLD: 0.2 K/UL (ref 0–0.8)
EOSINOPHIL NFR BLD: 3 % (ref 0.5–7.8)
ERYTHROCYTE [DISTWIDTH] IN BLOOD BY AUTOMATED COUNT: 12.6 % (ref 11.9–14.6)
GLOBULIN SER CALC-MCNC: 3.4 G/DL (ref 2.3–3.5)
GLUCOSE SERPL-MCNC: 162 MG/DL (ref 65–100)
HCT VFR BLD AUTO: 41.9 % (ref 41.1–50.3)
HDLC SERPL-MCNC: 45 MG/DL (ref 40–60)
HDLC SERPL: 4.6 {RATIO}
HGB BLD-MCNC: 13.6 G/DL (ref 13.6–17.2)
IMM GRANULOCYTES # BLD AUTO: 0 K/UL (ref 0–0.5)
IMM GRANULOCYTES NFR BLD AUTO: 0 % (ref 0–5)
LDLC SERPL CALC-MCNC: 135.8 MG/DL
LIPID PROFILE,FLP: ABNORMAL
LYMPHOCYTES # BLD: 2 K/UL (ref 0.5–4.6)
LYMPHOCYTES NFR BLD: 39 % (ref 13–44)
MCH RBC QN AUTO: 28.7 PG (ref 26.1–32.9)
MCHC RBC AUTO-ENTMCNC: 32.5 G/DL (ref 31.4–35)
MCV RBC AUTO: 88.4 FL (ref 79.6–97.8)
MONOCYTES # BLD: 0.4 K/UL (ref 0.1–1.3)
MONOCYTES NFR BLD: 8 % (ref 4–12)
NEUTS SEG # BLD: 2.5 K/UL (ref 1.7–8.2)
NEUTS SEG NFR BLD: 49 % (ref 43–78)
NRBC # BLD: 0 K/UL (ref 0–0.2)
PLATELET # BLD AUTO: 200 K/UL (ref 150–450)
PMV BLD AUTO: 10.6 FL (ref 9.4–12.3)
POTASSIUM SERPL-SCNC: 4.4 MMOL/L (ref 3.5–5.1)
PROT SERPL-MCNC: 7.2 G/DL
RBC # BLD AUTO: 4.74 M/UL (ref 4.23–5.6)
SODIUM SERPL-SCNC: 139 MMOL/L (ref 136–145)
TRIGL SERPL-MCNC: 136 MG/DL (ref 35–150)
TSH SERPL DL<=0.005 MIU/L-ACNC: 1.52 UIU/ML
VLDLC SERPL CALC-MCNC: 27.2 MG/DL (ref 6–23)
WBC # BLD AUTO: 5.1 K/UL (ref 4.3–11.1)

## 2019-03-07 PROCEDURE — 80061 LIPID PANEL: CPT

## 2019-03-07 PROCEDURE — 36415 COLL VENOUS BLD VENIPUNCTURE: CPT

## 2019-03-07 PROCEDURE — 85025 COMPLETE CBC W/AUTO DIFF WBC: CPT

## 2019-03-07 PROCEDURE — 80053 COMPREHEN METABOLIC PANEL: CPT

## 2019-03-07 PROCEDURE — 84443 ASSAY THYROID STIM HORMONE: CPT

## 2019-03-07 NOTE — PROGRESS NOTES
Cholesterol levels are REMARKABLY better with higher dose of Lovaza and presumed improved glycemic control!!! Blood sugar remains a bit elevated. Kidney & liver function are normal.  Thyroid function is normal.  Blood counts are normal.  Results will be discussed with patient during upcoming office visit.

## 2019-05-13 PROBLEM — G56.02 CARPAL TUNNEL SYNDROME OF LEFT WRIST: Status: ACTIVE | Noted: 2019-05-13

## 2019-05-13 PROBLEM — G56.22 ENTRAPMENT OF LEFT ULNAR NERVE: Status: ACTIVE | Noted: 2019-05-13

## 2019-06-06 ENCOUNTER — TELEPHONE (OUTPATIENT)
Dept: PHARMACY | Age: 69
End: 2019-06-06

## 2019-06-06 NOTE — TELEPHONE ENCOUNTER
Terryann Goltz, MD, patient will experience changes to his formulary after July 1, 2019. Patient reports he has an upcoming office visit in June and would like to discuss switching to lower cost alternatives. Analisa Tafoya: This medication is moving from Tier 2 ($30 for 30ds, $60 for 90ds) to Tier 3 ($75 for 30ds, $150 for 90ds). Tier 2 options for basal insulin include: Toujeo/Toujeo Max [employer] and [employer]. Novolog: This medication is moving from Tier 2 ($30 for 30ds, $60 for 90ds) to Tier 3 ($75 for 30ds, $150 for 90ds). Tier 1 option for bolus insulin include: Humalog U-100 Kwikpen ($12 for 30ds, $24 for 90ds)    Tier 2 options for SGLT2 include: Franciso Forth, and Invokana as well as their metformin combination products (Synjardy, Xigduo, and Invokamet) ($30 for 30ds, $60 for 90ds)    Please let me know if you have any other questions about the new formulary. Thank you,  Rod Kwong, PharmD  [address] [facility] [place] Pharmacist  [phone] (Option 7)  =======================================================  CLINICAL PHARMACY CONSULT: MEDICATION CONVERSION INITIATIVE    Rachelle Cui is a 80 y.o. male referred to clinical pharmacy specialist - identified with current prescription for Tresiba U-[address]. Starting July 2019, the prescribed medication is going to moving from tier 2 to tier 3. For ministry and patient cost savings, a conversion has been identified to [employer] or Lantus. · Patient's current copay is $30 for 22ds. · If patient stays on Tresiba without copay card, the copay will increase to $75 for 30ds or $150 for 90ds.   · If switched to Lantus or Toujeo Max without copay card, the copay will be $30 for 30ds or $60 for 90ds  · Patient does not qualify for coupon cards due to being Medicare eligible (age > 73 yo)    Patient is also on Novolog U-100 insulin pen:  · If patient stays on Novolog without copay card, the copay will increase to $75 for 30ds or $150 for 90ds. · If switched to Humalog U-100 insulin pen, the copay will be $12 for 30ds or $24 for 90ds    PLAN:  - Medications:   Consider changing from [place] 80 units daily to [employer] and from Novolog SSI to Humalog    - Labs/follow up:  Per provider discretion     Ange Cole, PharmD  Clinical Pharmacist  [phone] (Option 7)  ======================================================  Spoke to patient and wife Joseluis Linsey). Reviewed above. Patient verbalizes understanding.  - Patient and wife confirm patient is taking [place] 80 units daily, but said he is using Novolog as a sliding scale (unsure how much he is actually using). Seems open to changing to less expensive options as patient does not qualify for coupon cards. - Reports upcoming OV in June with Dr. Claudia Peterson although I do not see any on the appointment tab. - Patient and wife asked if I would send formulary information to Dr. Claudia Peterson to help aid the decision making process at upcoming appointment. - Patient's wife asked if I would send the same information to [email or web address] for their records.

## 2019-06-25 PROBLEM — Z91.14 NON COMPLIANCE W MEDICATION REGIMEN: Status: ACTIVE | Noted: 2019-06-25

## 2019-06-25 PROBLEM — R09.02 HYPOXIA: Status: RESOLVED | Noted: 2017-10-27 | Resolved: 2019-06-25

## 2019-09-06 ENCOUNTER — HOSPITAL ENCOUNTER (OUTPATIENT)
Dept: CT IMAGING | Age: 69
Discharge: HOME | End: 2019-09-06
Attending: INTERNAL MEDICINE
Payer: COMMERCIAL

## 2019-09-06 DIAGNOSIS — R10.30 LOWER ABDOMINAL PAIN: ICD-10-CM

## 2019-09-06 DIAGNOSIS — R35.0 URINARY FREQUENCY: ICD-10-CM

## 2019-09-06 LAB — CREAT BLD-MCNC: 1.4 MG/DL (ref 0.8–1.5)

## 2019-09-06 PROCEDURE — 74011636320 HC RX REV CODE- 636/320: Performed by: INTERNAL MEDICINE

## 2019-09-06 PROCEDURE — 74177 CT ABD & PELVIS W/CONTRAST: CPT

## 2019-09-06 PROCEDURE — 74011000258 HC RX REV CODE- 258: Performed by: INTERNAL MEDICINE

## 2019-09-06 PROCEDURE — 82565 ASSAY OF CREATININE: CPT

## 2019-09-06 RX ORDER — SODIUM CHLORIDE 0.9 % (FLUSH) 0.9 %
10 SYRINGE (ML) INJECTION
Status: COMPLETED | OUTPATIENT
Start: 2019-09-06 | End: 2019-09-06

## 2019-09-06 RX ADMIN — SODIUM CHLORIDE 100 ML: 900 INJECTION, SOLUTION INTRAVENOUS at 15:17

## 2019-09-06 RX ADMIN — IOPAMIDOL 100 ML: 755 INJECTION, SOLUTION INTRAVENOUS at 15:16

## 2019-09-06 RX ADMIN — Medication 10 ML: at 15:16

## 2019-09-06 RX ADMIN — DIATRIZOATE MEGLUMINE AND DIATRIZOATE SODIUM 15 ML: 660; 100 LIQUID ORAL; RECTAL at 15:17

## 2019-09-24 PROBLEM — Z12.11 ENCOUNTER FOR SCREENING COLONOSCOPY: Status: RESOLVED | Noted: 2017-09-26 | Resolved: 2019-09-24

## 2019-10-02 ENCOUNTER — HOSPITAL ENCOUNTER (OUTPATIENT)
Dept: LAB | Age: 69
Discharge: HOME | End: 2019-10-02
Payer: COMMERCIAL

## 2019-10-02 DIAGNOSIS — E11.65 UNCONTROLLED TYPE 2 DIABETES MELLITUS WITH HYPERGLYCEMIA, WITH LONG-TERM CURRENT USE OF INSULIN (HCC): ICD-10-CM

## 2019-10-02 DIAGNOSIS — E78.2 MIXED DYSLIPIDEMIA: ICD-10-CM

## 2019-10-02 DIAGNOSIS — E03.9 ACQUIRED HYPOTHYROIDISM: ICD-10-CM

## 2019-10-02 DIAGNOSIS — Z79.4 UNCONTROLLED TYPE 2 DIABETES MELLITUS WITH HYPERGLYCEMIA, WITH LONG-TERM CURRENT USE OF INSULIN (HCC): ICD-10-CM

## 2019-10-02 LAB
ALBUMIN SERPL-MCNC: 3.7 G/DL (ref 3.2–4.6)
ALBUMIN/GLOB SERPL: 1.1 {RATIO} (ref 1.2–3.5)
ALP SERPL-CCNC: 39 U/L (ref 50–136)
ALT SERPL-CCNC: 50 U/L (ref 12–65)
ANION GAP SERPL CALC-SCNC: 7 MMOL/L (ref 7–16)
AST SERPL-CCNC: 26 U/L (ref 15–37)
BILIRUB SERPL-MCNC: 0.4 MG/DL (ref 0.2–1.1)
BUN SERPL-MCNC: 24 MG/DL (ref 8–23)
CALCIUM SERPL-MCNC: 9.5 MG/DL (ref 8.3–10.4)
CHLORIDE SERPL-SCNC: 102 MMOL/L (ref 98–107)
CHOLEST SERPL-MCNC: 320 MG/DL
CO2 SERPL-SCNC: 27 MMOL/L (ref 21–32)
CREAT SERPL-MCNC: 1.34 MG/DL (ref 0.8–1.5)
GLOBULIN SER CALC-MCNC: 3.5 G/DL (ref 2.3–3.5)
GLUCOSE SERPL-MCNC: 364 MG/DL (ref 65–100)
HDLC SERPL-MCNC: 33 MG/DL (ref 40–60)
HDLC SERPL: 9.7 {RATIO}
LDLC SERPL CALC-MCNC: ABNORMAL MG/DL
LDLC SERPL DIRECT ASSAY-MCNC: 134 MG/DL
LIPID PROFILE,FLP: ABNORMAL
POTASSIUM SERPL-SCNC: 4.7 MMOL/L (ref 3.5–5.1)
PROT SERPL-MCNC: 7.2 G/DL (ref 6.3–8.2)
SODIUM SERPL-SCNC: 136 MMOL/L (ref 136–145)
TRIGL SERPL-MCNC: 1020 MG/DL (ref 35–150)
TSH SERPL DL<=0.005 MIU/L-ACNC: 1.7 UIU/ML
VLDLC SERPL CALC-MCNC: 204 MG/DL (ref 6–23)

## 2019-10-02 PROCEDURE — 36415 COLL VENOUS BLD VENIPUNCTURE: CPT

## 2019-10-02 PROCEDURE — 84443 ASSAY THYROID STIM HORMONE: CPT

## 2019-10-02 PROCEDURE — 80053 COMPREHEN METABOLIC PANEL: CPT

## 2019-10-02 PROCEDURE — 80061 LIPID PANEL: CPT

## 2019-10-02 PROCEDURE — 83721 ASSAY OF BLOOD LIPOPROTEIN: CPT

## 2019-10-04 NOTE — PROGRESS NOTES
Triglycerides are back off the charts again - will need to evaluate both diet and compliance with meds! LDL is also much too high given diabetes history. Blood sugar has increased further! Kidney & liver function are normal.  Thyroid function is well controlled with current replacement. Results will be discussed with patient during upcoming office visit.

## 2019-12-10 ENCOUNTER — HOSPITAL ENCOUNTER (OUTPATIENT)
Dept: GENERAL RADIOLOGY | Age: 69
Discharge: HOME | End: 2019-12-10
Attending: PHYSICIAN ASSISTANT
Payer: COMMERCIAL

## 2019-12-10 DIAGNOSIS — M25.511 ACUTE PAIN OF RIGHT SHOULDER: ICD-10-CM

## 2019-12-10 PROCEDURE — 73030 X-RAY EXAM OF SHOULDER: CPT

## 2020-02-14 ENCOUNTER — HOSPITAL ENCOUNTER (OUTPATIENT)
Dept: LAB | Age: 70
Discharge: HOME | End: 2020-02-14
Payer: MEDICARE

## 2020-02-14 LAB
ALBUMIN SERPL-MCNC: 3.7 G/DL (ref 3.2–4.6)
ALBUMIN/GLOB SERPL: 1.1 {RATIO} (ref 1.2–3.5)
ALP SERPL-CCNC: 34 U/L (ref 50–136)
ALT SERPL-CCNC: 43 U/L (ref 12–65)
ANION GAP SERPL CALC-SCNC: 6 MMOL/L (ref 7–16)
AST SERPL-CCNC: 22 U/L (ref 15–37)
BILIRUB SERPL-MCNC: 0.5 MG/DL (ref 0.2–1.1)
BUN SERPL-MCNC: 24 MG/DL (ref 8–23)
CALCIUM SERPL-MCNC: 9 MG/DL (ref 8.3–10.4)
CHLORIDE SERPL-SCNC: 108 MMOL/L (ref 98–107)
CHOLEST SERPL-MCNC: 159 MG/DL
CO2 SERPL-SCNC: 28 MMOL/L (ref 21–32)
CREAT SERPL-MCNC: 1.05 MG/DL (ref 0.8–1.5)
GLOBULIN SER CALC-MCNC: 3.5 G/DL (ref 2.3–3.5)
GLUCOSE SERPL-MCNC: 88 MG/DL (ref 65–100)
HDLC SERPL-MCNC: 54 MG/DL (ref 40–60)
HDLC SERPL: 2.9 {RATIO}
LDLC SERPL CALC-MCNC: 86.4 MG/DL
LIPID PROFILE,FLP: NORMAL
POTASSIUM SERPL-SCNC: 4 MMOL/L (ref 3.5–5.1)
PROT SERPL-MCNC: 7.2 G/DL (ref 6.3–8.2)
SODIUM SERPL-SCNC: 142 MMOL/L (ref 136–145)
TRIGL SERPL-MCNC: 93 MG/DL (ref 35–150)
TSH SERPL DL<=0.005 MIU/L-ACNC: 1.06 UIU/ML
VLDLC SERPL CALC-MCNC: 18.6 MG/DL (ref 6–23)

## 2020-02-14 PROCEDURE — 80053 COMPREHEN METABOLIC PANEL: CPT

## 2020-02-14 PROCEDURE — 36415 COLL VENOUS BLD VENIPUNCTURE: CPT

## 2020-02-14 PROCEDURE — 80061 LIPID PANEL: CPT

## 2020-02-14 PROCEDURE — 84443 ASSAY THYROID STIM HORMONE: CPT

## 2020-02-18 NOTE — PROGRESS NOTES
Fasting blood sugar is REMARKABLY better!!! Kidney & liver function are normal.  Cholesterol is AMAZINGLY improved as well with changes in regimen and timing of meds!!  Thyroid function is well maintained on current replacement. Results will be discussed with patient during upcoming office visit.

## 2020-06-17 ENCOUNTER — HOSPITAL ENCOUNTER (OUTPATIENT)
Dept: LAB | Age: 70
Discharge: HOME | End: 2020-06-17
Payer: MEDICARE

## 2020-06-17 DIAGNOSIS — E11.65 UNCONTROLLED TYPE 2 DIABETES MELLITUS WITH HYPERGLYCEMIA, WITH LONG-TERM CURRENT USE OF INSULIN (HCC): ICD-10-CM

## 2020-06-17 DIAGNOSIS — E78.2 MIXED HYPERLIPIDEMIA: ICD-10-CM

## 2020-06-17 DIAGNOSIS — Z79.4 UNCONTROLLED TYPE 2 DIABETES MELLITUS WITH HYPERGLYCEMIA, WITH LONG-TERM CURRENT USE OF INSULIN (HCC): ICD-10-CM

## 2020-06-17 DIAGNOSIS — I10 ESSENTIAL HYPERTENSION: ICD-10-CM

## 2020-06-17 DIAGNOSIS — K21.9 GASTROESOPHAGEAL REFLUX DISEASE, ESOPHAGITIS PRESENCE NOT SPECIFIED: ICD-10-CM

## 2020-06-17 DIAGNOSIS — E03.9 ACQUIRED HYPOTHYROIDISM: ICD-10-CM

## 2020-06-17 LAB
ALBUMIN SERPL-MCNC: 4.2 G/DL (ref 3.2–4.6)
ALBUMIN/GLOB SERPL: 1.2 {RATIO} (ref 1.2–3.5)
ALP SERPL-CCNC: 36 U/L (ref 50–136)
ALT SERPL-CCNC: 45 U/L (ref 12–65)
ANION GAP SERPL CALC-SCNC: 6 MMOL/L (ref 7–16)
AST SERPL-CCNC: 28 U/L (ref 15–37)
BASOPHILS # BLD: 0 K/UL (ref 0–0.2)
BASOPHILS NFR BLD: 0 % (ref 0–2)
BILIRUB SERPL-MCNC: 0.4 MG/DL (ref 0.2–1.1)
BUN SERPL-MCNC: 33 MG/DL (ref 8–23)
CALCIUM SERPL-MCNC: 9.5 MG/DL (ref 8.3–10.4)
CHLORIDE SERPL-SCNC: 108 MMOL/L (ref 98–107)
CHOLEST SERPL-MCNC: 172 MG/DL
CO2 SERPL-SCNC: 29 MMOL/L (ref 21–32)
CREAT SERPL-MCNC: 1.21 MG/DL (ref 0.8–1.5)
CREAT UR-MCNC: 163 MG/DL
DIFFERENTIAL METHOD BLD: ABNORMAL
EOSINOPHIL # BLD: 0.1 K/UL (ref 0–0.8)
EOSINOPHIL NFR BLD: 1 % (ref 0.5–7.8)
ERYTHROCYTE [DISTWIDTH] IN BLOOD BY AUTOMATED COUNT: 12.7 % (ref 11.9–14.6)
EST. AVERAGE GLUCOSE BLD GHB EST-MCNC: 214 MG/DL
GLOBULIN SER CALC-MCNC: 3.4 G/DL (ref 2.3–3.5)
GLUCOSE SERPL-MCNC: 66 MG/DL (ref 65–100)
HBA1C MFR BLD: 9.1 %
HCT VFR BLD AUTO: 38.6 % (ref 41.1–50.3)
HDLC SERPL-MCNC: 45 MG/DL (ref 40–60)
HDLC SERPL: 3.8 {RATIO}
HGB BLD-MCNC: 12.5 G/DL (ref 13.6–17.2)
IMM GRANULOCYTES # BLD AUTO: 0 K/UL (ref 0–0.5)
IMM GRANULOCYTES NFR BLD AUTO: 0 % (ref 0–5)
LDLC SERPL CALC-MCNC: 90.6 MG/DL
LIPID PROFILE,FLP: ABNORMAL
LYMPHOCYTES # BLD: 2 K/UL (ref 0.5–4.6)
LYMPHOCYTES NFR BLD: 21 % (ref 13–44)
MCH RBC QN AUTO: 28.8 PG (ref 26.1–32.9)
MCHC RBC AUTO-ENTMCNC: 32.4 G/DL (ref 31.4–35)
MCV RBC AUTO: 88.9 FL (ref 79.6–97.8)
MICROALBUMIN UR-MCNC: 1.77 MG/DL
MICROALBUMIN/CREAT UR-RTO: 11 MG/G
MONOCYTES # BLD: 0.7 K/UL (ref 0.1–1.3)
MONOCYTES NFR BLD: 7 % (ref 4–12)
NEUTS SEG # BLD: 6.6 K/UL (ref 1.7–8.2)
NEUTS SEG NFR BLD: 70 % (ref 43–78)
NRBC # BLD: 0 K/UL (ref 0–0.2)
PLATELET # BLD AUTO: 225 K/UL (ref 150–450)
PMV BLD AUTO: 11.2 FL (ref 9.4–12.3)
POTASSIUM SERPL-SCNC: 4 MMOL/L (ref 3.5–5.1)
PROT SERPL-MCNC: 7.6 G/DL (ref 6.3–8.2)
RBC # BLD AUTO: 4.34 M/UL (ref 4.23–5.6)
SODIUM SERPL-SCNC: 143 MMOL/L (ref 136–145)
TRIGL SERPL-MCNC: 182 MG/DL (ref 35–150)
TSH SERPL DL<=0.005 MIU/L-ACNC: 1.59 UIU/ML
VLDLC SERPL CALC-MCNC: 36.4 MG/DL (ref 6–23)
WBC # BLD AUTO: 9.4 K/UL (ref 4.3–11.1)

## 2020-06-17 PROCEDURE — 80053 COMPREHEN METABOLIC PANEL: CPT

## 2020-06-17 PROCEDURE — 83036 HEMOGLOBIN GLYCOSYLATED A1C: CPT

## 2020-06-17 PROCEDURE — 85025 COMPLETE CBC W/AUTO DIFF WBC: CPT

## 2020-06-17 PROCEDURE — 84443 ASSAY THYROID STIM HORMONE: CPT

## 2020-06-17 PROCEDURE — 36415 COLL VENOUS BLD VENIPUNCTURE: CPT

## 2020-06-17 PROCEDURE — 80061 LIPID PANEL: CPT

## 2020-06-17 PROCEDURE — 82043 UR ALBUMIN QUANTITATIVE: CPT

## 2020-06-18 NOTE — PROGRESS NOTES
Hemoglobin levels have dropped 1.4 points over the past 9 months - will discuss and possibly add more labs to evaluate further. Fasting blood sugar is quite low actually. Kidney & liver function are normal.  Cholesterol levels are mixed - LDL is stable but triglycerides have increased by 100 points in the past 4 months. Thyroid function is well maintained with current replacement. Overall diabetes control has worsened again - will need to discuss compliance with meds and dietary factors. Urine shows no signs of early kidney damage from diabetes. Results will be discussed with patient during upcoming office visit.

## 2020-07-22 PROBLEM — Z91.14 NON COMPLIANCE W MEDICATION REGIMEN: Status: RESOLVED | Noted: 2019-06-25 | Resolved: 2020-07-22

## 2020-07-22 PROBLEM — I15.2 HYPERTENSION DUE TO ENDOCRINE DISORDER: Status: RESOLVED | Noted: 2017-06-12 | Resolved: 2020-07-22

## 2020-07-22 PROBLEM — G47.10 HYPERSOMNIA: Status: RESOLVED | Noted: 2017-10-27 | Resolved: 2020-07-22

## 2020-07-22 PROBLEM — G47.61 PERIODIC LIMB MOVEMENT DISORDER: Status: RESOLVED | Noted: 2017-10-27 | Resolved: 2020-07-22

## 2020-08-25 ENCOUNTER — HOSPITAL ENCOUNTER (OUTPATIENT)
Dept: LAB | Age: 70
Discharge: HOME | End: 2020-08-25
Payer: MEDICARE

## 2020-08-25 DIAGNOSIS — K21.9 GASTROESOPHAGEAL REFLUX DISEASE WITHOUT ESOPHAGITIS: ICD-10-CM

## 2020-08-25 DIAGNOSIS — E78.2 MIXED HYPERLIPIDEMIA: ICD-10-CM

## 2020-08-25 DIAGNOSIS — E11.65 UNCONTROLLED TYPE 2 DIABETES MELLITUS WITH HYPERGLYCEMIA, WITH LONG-TERM CURRENT USE OF INSULIN (HCC): ICD-10-CM

## 2020-08-25 DIAGNOSIS — E03.9 ACQUIRED HYPOTHYROIDISM: ICD-10-CM

## 2020-08-25 DIAGNOSIS — Z79.4 UNCONTROLLED TYPE 2 DIABETES MELLITUS WITH HYPERGLYCEMIA, WITH LONG-TERM CURRENT USE OF INSULIN (HCC): ICD-10-CM

## 2020-08-25 DIAGNOSIS — R71.0 DECREASED HEMOGLOBIN: ICD-10-CM

## 2020-08-25 DIAGNOSIS — I10 ESSENTIAL HYPERTENSION: ICD-10-CM

## 2020-08-25 LAB
ALBUMIN SERPL-MCNC: 3.8 G/DL (ref 3.2–4.6)
ALBUMIN/GLOB SERPL: 1 {RATIO} (ref 1.2–3.5)
ALP SERPL-CCNC: 43 U/L (ref 50–136)
ALT SERPL-CCNC: 66 U/L (ref 12–65)
ANION GAP SERPL CALC-SCNC: 5 MMOL/L (ref 7–16)
AST SERPL-CCNC: 27 U/L (ref 15–37)
BASOPHILS # BLD: 0.1 K/UL (ref 0–0.2)
BASOPHILS NFR BLD: 1 % (ref 0–2)
BILIRUB SERPL-MCNC: 0.4 MG/DL (ref 0.2–1.1)
BUN SERPL-MCNC: 31 MG/DL (ref 8–23)
CALCIUM SERPL-MCNC: 9.5 MG/DL (ref 8.3–10.4)
CHLORIDE SERPL-SCNC: 103 MMOL/L (ref 98–107)
CHOLEST SERPL-MCNC: 189 MG/DL
CO2 SERPL-SCNC: 28 MMOL/L (ref 21–32)
CREAT SERPL-MCNC: 1.3 MG/DL (ref 0.8–1.5)
DIFFERENTIAL METHOD BLD: NORMAL
EOSINOPHIL # BLD: 0.2 K/UL (ref 0–0.8)
EOSINOPHIL NFR BLD: 2 % (ref 0.5–7.8)
ERYTHROCYTE [DISTWIDTH] IN BLOOD BY AUTOMATED COUNT: 12 % (ref 11.9–14.6)
EST. AVERAGE GLUCOSE BLD GHB EST-MCNC: 209 MG/DL
GLOBULIN SER CALC-MCNC: 3.8 G/DL (ref 2.3–3.5)
GLUCOSE SERPL-MCNC: 215 MG/DL (ref 65–100)
HBA1C MFR BLD: 8.9 %
HCT VFR BLD AUTO: 42.2 % (ref 41.1–50.3)
HDLC SERPL-MCNC: 36 MG/DL (ref 40–60)
HDLC SERPL: 5.3 {RATIO}
HGB BLD-MCNC: 13.9 G/DL (ref 13.6–17.2)
IMM GRANULOCYTES # BLD AUTO: 0 K/UL (ref 0–0.5)
IMM GRANULOCYTES NFR BLD AUTO: 0 % (ref 0–5)
LDLC SERPL CALC-MCNC: 94.2 MG/DL
LIPID PROFILE,FLP: ABNORMAL
LYMPHOCYTES # BLD: 2.4 K/UL (ref 0.5–4.6)
LYMPHOCYTES NFR BLD: 32 % (ref 13–44)
MCH RBC QN AUTO: 28.9 PG (ref 26.1–32.9)
MCHC RBC AUTO-ENTMCNC: 32.9 G/DL (ref 31.4–35)
MCV RBC AUTO: 87.7 FL (ref 79.6–97.8)
MONOCYTES # BLD: 0.6 K/UL (ref 0.1–1.3)
MONOCYTES NFR BLD: 8 % (ref 4–12)
NEUTS SEG # BLD: 4.1 K/UL (ref 1.7–8.2)
NEUTS SEG NFR BLD: 57 % (ref 43–78)
NRBC # BLD: 0 K/UL (ref 0–0.2)
PLATELET # BLD AUTO: 194 K/UL (ref 150–450)
PMV BLD AUTO: 11.3 FL (ref 9.4–12.3)
POTASSIUM SERPL-SCNC: 4.3 MMOL/L (ref 3.5–5.1)
PROT SERPL-MCNC: 7.6 G/DL (ref 6.3–8.2)
RBC # BLD AUTO: 4.81 M/UL (ref 4.23–5.6)
SODIUM SERPL-SCNC: 136 MMOL/L (ref 136–145)
TRIGL SERPL-MCNC: 294 MG/DL (ref 35–150)
TSH SERPL DL<=0.005 MIU/L-ACNC: 1.91 UIU/ML
VLDLC SERPL CALC-MCNC: 58.8 MG/DL (ref 6–23)
WBC # BLD AUTO: 7.3 K/UL (ref 4.3–11.1)

## 2020-08-25 PROCEDURE — 80061 LIPID PANEL: CPT

## 2020-08-25 PROCEDURE — 36415 COLL VENOUS BLD VENIPUNCTURE: CPT

## 2020-08-25 PROCEDURE — 80053 COMPREHEN METABOLIC PANEL: CPT

## 2020-08-25 PROCEDURE — 84443 ASSAY THYROID STIM HORMONE: CPT

## 2020-08-25 PROCEDURE — 83036 HEMOGLOBIN GLYCOSYLATED A1C: CPT

## 2020-08-25 PROCEDURE — 85025 COMPLETE CBC W/AUTO DIFF WBC: CPT

## 2020-10-27 ENCOUNTER — HOSPITAL ENCOUNTER (OUTPATIENT)
Dept: LAB | Age: 70
Discharge: HOME | End: 2020-10-27
Payer: MEDICARE

## 2020-10-27 DIAGNOSIS — E11.65 UNCONTROLLED TYPE 2 DIABETES MELLITUS WITH HYPERGLYCEMIA, WITH LONG-TERM CURRENT USE OF INSULIN (HCC): ICD-10-CM

## 2020-10-27 DIAGNOSIS — E03.9 ACQUIRED HYPOTHYROIDISM: ICD-10-CM

## 2020-10-27 DIAGNOSIS — I10 ESSENTIAL HYPERTENSION: ICD-10-CM

## 2020-10-27 DIAGNOSIS — E78.2 MIXED HYPERLIPIDEMIA: ICD-10-CM

## 2020-10-27 DIAGNOSIS — Z79.4 UNCONTROLLED TYPE 2 DIABETES MELLITUS WITH HYPERGLYCEMIA, WITH LONG-TERM CURRENT USE OF INSULIN (HCC): ICD-10-CM

## 2020-10-27 LAB
ALBUMIN SERPL-MCNC: 3.6 G/DL (ref 3.2–4.6)
ALBUMIN/GLOB SERPL: 1 {RATIO} (ref 1.2–3.5)
ALP SERPL-CCNC: 36 U/L (ref 50–136)
ALT SERPL-CCNC: 49 U/L (ref 12–65)
ANION GAP SERPL CALC-SCNC: 6 MMOL/L (ref 7–16)
AST SERPL-CCNC: 27 U/L (ref 15–37)
BILIRUB SERPL-MCNC: 0.4 MG/DL (ref 0.2–1.1)
BUN SERPL-MCNC: 22 MG/DL (ref 8–23)
CALCIUM SERPL-MCNC: 9.1 MG/DL (ref 8.3–10.4)
CHLORIDE SERPL-SCNC: 109 MMOL/L (ref 98–107)
CHOLEST SERPL-MCNC: 145 MG/DL
CO2 SERPL-SCNC: 26 MMOL/L (ref 21–32)
CREAT SERPL-MCNC: 1.19 MG/DL (ref 0.8–1.5)
EST. AVERAGE GLUCOSE BLD GHB EST-MCNC: 217 MG/DL
GLOBULIN SER CALC-MCNC: 3.7 G/DL (ref 2.3–3.5)
GLUCOSE SERPL-MCNC: 122 MG/DL (ref 65–100)
HBA1C MFR BLD: 9.2 %
HDLC SERPL-MCNC: 51 MG/DL (ref 40–60)
HDLC SERPL: 2.8 {RATIO}
LDLC SERPL CALC-MCNC: 68.4 MG/DL
LIPID PROFILE,FLP: ABNORMAL
POTASSIUM SERPL-SCNC: 4.8 MMOL/L (ref 3.5–5.1)
PROT SERPL-MCNC: 7.3 G/DL (ref 6.3–8.2)
SODIUM SERPL-SCNC: 141 MMOL/L (ref 136–145)
TRIGL SERPL-MCNC: 128 MG/DL (ref 35–150)
TSH SERPL DL<=0.005 MIU/L-ACNC: 1.74 UIU/ML
VLDLC SERPL CALC-MCNC: 25.6 MG/DL (ref 6–23)

## 2020-10-27 PROCEDURE — 80061 LIPID PANEL: CPT

## 2020-10-27 PROCEDURE — 83036 HEMOGLOBIN GLYCOSYLATED A1C: CPT

## 2020-10-27 PROCEDURE — 36415 COLL VENOUS BLD VENIPUNCTURE: CPT

## 2020-10-27 PROCEDURE — 80053 COMPREHEN METABOLIC PANEL: CPT

## 2020-10-27 PROCEDURE — 84443 ASSAY THYROID STIM HORMONE: CPT

## 2020-10-27 NOTE — PROGRESS NOTES
Glucose levels look MUCH better! Kidney & liver function are normal.  Cholesterol levels also look great with significant improvement in triglycerides! Thyroid function is well maintained on current regimen. Overall glucose average is unfortunately not improved - will have to evaluate treatment regimen & lifestyle contribution. Results will be discussed with patient during upcoming office visit.

## 2020-12-10 ENCOUNTER — HOSPITAL ENCOUNTER (OUTPATIENT)
Dept: GENERAL RADIOLOGY | Age: 70
Discharge: HOME | End: 2020-12-10
Attending: PHYSICIAN ASSISTANT
Payer: MEDICARE

## 2020-12-10 DIAGNOSIS — R06.09 DYSPNEA ON EXERTION: ICD-10-CM

## 2020-12-10 PROCEDURE — 71046 X-RAY EXAM CHEST 2 VIEWS: CPT

## 2020-12-21 PROBLEM — I51.7 RIGHT HEART ENLARGEMENT: Status: ACTIVE | Noted: 2020-12-21

## 2020-12-21 PROBLEM — E66.9 OBESITY (BMI 30-39.9): Status: ACTIVE | Noted: 2020-12-21

## 2021-01-11 ENCOUNTER — HOSPITAL ENCOUNTER (OUTPATIENT)
Dept: LAB | Age: 71
Discharge: HOME | End: 2021-01-11
Payer: MEDICARE

## 2021-01-11 DIAGNOSIS — R06.02 SHORTNESS OF BREATH: ICD-10-CM

## 2021-01-11 DIAGNOSIS — I51.7 RIGHT HEART ENLARGEMENT: ICD-10-CM

## 2021-01-11 DIAGNOSIS — R06.09 DOE (DYSPNEA ON EXERTION): ICD-10-CM

## 2021-01-11 DIAGNOSIS — I15.2 HYPERTENSION DUE TO ENDOCRINE DISORDER: ICD-10-CM

## 2021-01-11 LAB
ANION GAP SERPL CALC-SCNC: 4 MMOL/L (ref 7–16)
BASOPHILS # BLD: 0 K/UL (ref 0–0.2)
BASOPHILS NFR BLD: 1 % (ref 0–2)
BUN SERPL-MCNC: 34 MG/DL (ref 8–23)
CALCIUM SERPL-MCNC: 9.7 MG/DL (ref 8.3–10.4)
CHLORIDE SERPL-SCNC: 107 MMOL/L (ref 98–107)
CO2 SERPL-SCNC: 29 MMOL/L (ref 21–32)
CREAT SERPL-MCNC: 1.32 MG/DL (ref 0.8–1.5)
DIFFERENTIAL METHOD BLD: NORMAL
EOSINOPHIL # BLD: 0.1 K/UL (ref 0–0.8)
EOSINOPHIL NFR BLD: 2 % (ref 0.5–7.8)
ERYTHROCYTE [DISTWIDTH] IN BLOOD BY AUTOMATED COUNT: 12.6 % (ref 11.9–14.6)
GLUCOSE SERPL-MCNC: 142 MG/DL (ref 65–100)
HCT VFR BLD AUTO: 41.9 % (ref 41.1–50.3)
HGB BLD-MCNC: 13.6 G/DL (ref 13.6–17.2)
IMM GRANULOCYTES # BLD AUTO: 0 K/UL (ref 0–0.5)
IMM GRANULOCYTES NFR BLD AUTO: 0 % (ref 0–5)
LYMPHOCYTES # BLD: 2.1 K/UL (ref 0.5–4.6)
LYMPHOCYTES NFR BLD: 40 % (ref 13–44)
MCH RBC QN AUTO: 29.3 PG (ref 26.1–32.9)
MCHC RBC AUTO-ENTMCNC: 32.5 G/DL (ref 31.4–35)
MCV RBC AUTO: 90.3 FL (ref 79.6–97.8)
MONOCYTES # BLD: 0.4 K/UL (ref 0.1–1.3)
MONOCYTES NFR BLD: 8 % (ref 4–12)
NEUTS SEG # BLD: 2.5 K/UL (ref 1.7–8.2)
NEUTS SEG NFR BLD: 49 % (ref 43–78)
NRBC # BLD: 0 K/UL (ref 0–0.2)
PLATELET # BLD AUTO: 182 K/UL (ref 150–450)
PMV BLD AUTO: 11.4 FL (ref 9.4–12.3)
POTASSIUM SERPL-SCNC: 4.9 MMOL/L (ref 3.5–5.1)
RBC # BLD AUTO: 4.64 M/UL (ref 4.23–5.6)
SODIUM SERPL-SCNC: 140 MMOL/L (ref 138–145)
WBC # BLD AUTO: 5.2 K/UL (ref 4.3–11.1)

## 2021-01-11 PROCEDURE — 36415 COLL VENOUS BLD VENIPUNCTURE: CPT

## 2021-01-11 PROCEDURE — 80048 BASIC METABOLIC PNL TOTAL CA: CPT

## 2021-01-11 PROCEDURE — 85025 COMPLETE CBC W/AUTO DIFF WBC: CPT

## 2021-01-11 NOTE — PROGRESS NOTES
Patient pre-assessment complete for Missael Grant scheduled for [facility], arrival time 0900 am. Patient verified using . Patient instructed to bring all home medications in labeled bottles on the day of procedure. NPO status reinforced. Patient informed to take a full dose aspirin 325mg  or 81 mg x 4 on the day of procedure. Patient instructed to [address] and take 1/2 dose of insulin night prior to procedure. Instructed they can take all other medications excluding vitamins & supplements. Patient verbalizes understanding of all instructions & denies any questions at this time.

## 2021-01-12 ENCOUNTER — HOSPITAL ENCOUNTER (OUTPATIENT)
Dept: CARDIAC CATH/INVASIVE PROCEDURES | Age: 71
Discharge: HOME | End: 2021-01-13
Attending: INTERNAL MEDICINE | Admitting: INTERNAL MEDICINE
Payer: MEDICARE

## 2021-01-12 DIAGNOSIS — I25.118 CORONARY ARTERY DISEASE OF NATIVE ARTERY OF NATIVE HEART WITH STABLE ANGINA PECTORIS (HCC): ICD-10-CM

## 2021-01-12 PROBLEM — I25.10 CAD (CORONARY ARTERY DISEASE): Status: ACTIVE | Noted: 2021-01-12

## 2021-01-12 LAB
ATRIAL RATE: 61 BPM
CALCULATED P AXIS, ECG09: 47 DEGREES
CALCULATED R AXIS, ECG10: 22 DEGREES
CALCULATED T AXIS, ECG11: 47 DEGREES
DIAGNOSIS, 93000: NORMAL
GLUCOSE BLD STRIP.AUTO-MCNC: 159 MG/DL (ref 65–100)
GLUCOSE BLD STRIP.AUTO-MCNC: 229 MG/DL (ref 65–100)
GLUCOSE BLD STRIP.AUTO-MCNC: 232 MG/DL (ref 65–100)
P-R INTERVAL, ECG05: 184 MS
Q-T INTERVAL, ECG07: 418 MS
QRS DURATION, ECG06: 100 MS
QTC CALCULATION (BEZET), ECG08: 420 MS
VENTRICULAR RATE, ECG03: 61 BPM

## 2021-01-12 PROCEDURE — 92921 PR PRQ TRLUML CORONARY ANGIOPLASTY ADDL BRANCH: CPT | Performed by: INTERNAL MEDICINE

## 2021-01-12 PROCEDURE — 77030016699 HC CATH ANGI DX INFN1 CARD -A

## 2021-01-12 PROCEDURE — 99153 MOD SED SAME PHYS/QHP EA: CPT

## 2021-01-12 PROCEDURE — C1725 CATH, TRANSLUMIN NON-LASER: HCPCS

## 2021-01-12 PROCEDURE — 74011250636 HC RX REV CODE- 250/636: Performed by: INTERNAL MEDICINE

## 2021-01-12 PROCEDURE — 93460 R&L HRT ART/VENTRICLE ANGIO: CPT | Performed by: INTERNAL MEDICINE

## 2021-01-12 PROCEDURE — 74011250636 HC RX REV CODE- 250/636: Performed by: NURSE PRACTITIONER

## 2021-01-12 PROCEDURE — 99152 MOD SED SAME PHYS/QHP 5/>YRS: CPT | Performed by: INTERNAL MEDICINE

## 2021-01-12 PROCEDURE — 82962 GLUCOSE BLOOD TEST: CPT

## 2021-01-12 PROCEDURE — 74011000250 HC RX REV CODE- 250: Performed by: INTERNAL MEDICINE

## 2021-01-12 PROCEDURE — 93005 ELECTROCARDIOGRAM TRACING: CPT | Performed by: INTERNAL MEDICINE

## 2021-01-12 PROCEDURE — C1769 GUIDE WIRE: HCPCS

## 2021-01-12 PROCEDURE — 2709999900 HC NON-CHARGEABLE SUPPLY

## 2021-01-12 PROCEDURE — 99152 MOD SED SAME PHYS/QHP 5/>YRS: CPT

## 2021-01-12 PROCEDURE — 82810 BLOOD GASES O2 SAT ONLY: CPT

## 2021-01-12 PROCEDURE — 92928 PRQ TCAT PLMT NTRAC ST 1 LES: CPT | Performed by: INTERNAL MEDICINE

## 2021-01-12 PROCEDURE — C1894 INTRO/SHEATH, NON-LASER: HCPCS

## 2021-01-12 PROCEDURE — C1760 CLOSURE DEV, VASC: HCPCS

## 2021-01-12 PROCEDURE — 77030012468 HC VLV BLEEDBK CNTRL ABBT -B

## 2021-01-12 PROCEDURE — 74011000636 HC RX REV CODE- 636: Performed by: INTERNAL MEDICINE

## 2021-01-12 PROCEDURE — 74011250637 HC RX REV CODE- 250/637: Performed by: INTERNAL MEDICINE

## 2021-01-12 PROCEDURE — 93460 R&L HRT ART/VENTRICLE ANGIO: CPT

## 2021-01-12 PROCEDURE — 92921 HC PTCA SNGL MAJOR COR VESL/BRNCH EA ADD LD: CPT

## 2021-01-12 PROCEDURE — 74011000258 HC RX REV CODE- 258: Performed by: INTERNAL MEDICINE

## 2021-01-12 PROCEDURE — 92928 PRQ TCAT PLMT NTRAC ST 1 LES: CPT

## 2021-01-12 PROCEDURE — C1751 CATH, INF, PER/CENT/MIDLINE: HCPCS

## 2021-01-12 PROCEDURE — C1887 CATHETER, GUIDING: HCPCS

## 2021-01-12 PROCEDURE — 74011250637 HC RX REV CODE- 250/637: Performed by: NURSE PRACTITIONER

## 2021-01-12 PROCEDURE — C1874 STENT, COATED/COV W/DEL SYS: HCPCS

## 2021-01-12 RX ORDER — MIDAZOLAM HYDROCHLORIDE 1 MG/ML
.5-5 INJECTION, SOLUTION INTRAMUSCULAR; INTRAVENOUS
Status: DISCONTINUED | OUTPATIENT
Start: 2021-01-12 | End: 2021-01-12 | Stop reason: HOSPADM

## 2021-01-12 RX ORDER — ROSUVASTATIN CALCIUM 20 MG/1
20 TABLET, COATED ORAL
Status: DISCONTINUED | OUTPATIENT
Start: 2021-01-12 | End: 2021-01-13 | Stop reason: HOSPADM

## 2021-01-12 RX ORDER — METOPROLOL SUCCINATE 50 MG/1
50 TABLET, EXTENDED RELEASE ORAL DAILY
Status: DISCONTINUED | OUTPATIENT
Start: 2021-01-13 | End: 2021-01-13 | Stop reason: HOSPADM

## 2021-01-12 RX ORDER — INSULIN GLARGINE 100 [IU]/ML
68 INJECTION, SOLUTION SUBCUTANEOUS DAILY
Status: DISCONTINUED | OUTPATIENT
Start: 2021-01-13 | End: 2021-01-13 | Stop reason: HOSPADM

## 2021-01-12 RX ORDER — ONDANSETRON 2 MG/ML
4 INJECTION INTRAMUSCULAR; INTRAVENOUS ONCE
Status: COMPLETED | OUTPATIENT
Start: 2021-01-12 | End: 2021-01-12

## 2021-01-12 RX ORDER — GUAIFENESIN 100 MG/5ML
81 LIQUID (ML) ORAL ONCE
Status: ACTIVE | OUTPATIENT
Start: 2021-01-12 | End: 2021-01-12

## 2021-01-12 RX ORDER — HYDRALAZINE HYDROCHLORIDE 20 MG/ML
10-20 INJECTION INTRAMUSCULAR; INTRAVENOUS AS NEEDED
Status: DISCONTINUED | OUTPATIENT
Start: 2021-01-12 | End: 2021-01-13 | Stop reason: HOSPADM

## 2021-01-12 RX ORDER — SODIUM CHLORIDE 9 MG/ML
75 INJECTION, SOLUTION INTRAVENOUS CONTINUOUS
Status: DISCONTINUED | OUTPATIENT
Start: 2021-01-12 | End: 2021-01-13 | Stop reason: HOSPADM

## 2021-01-12 RX ORDER — SODIUM CHLORIDE 0.9 % (FLUSH) 0.9 %
5-40 SYRINGE (ML) INJECTION AS NEEDED
Status: DISCONTINUED | OUTPATIENT
Start: 2021-01-12 | End: 2021-01-13 | Stop reason: HOSPADM

## 2021-01-12 RX ORDER — ALOGLIPTIN 12.5 MG/1
12.5 TABLET, FILM COATED ORAL DAILY
Status: DISCONTINUED | OUTPATIENT
Start: 2021-01-13 | End: 2021-01-13 | Stop reason: HOSPADM

## 2021-01-12 RX ORDER — FENOFIBRATE 160 MG/1
160 TABLET ORAL DAILY
Status: DISCONTINUED | OUTPATIENT
Start: 2021-01-13 | End: 2021-01-13 | Stop reason: HOSPADM

## 2021-01-12 RX ORDER — GUAIFENESIN 100 MG/5ML
81 LIQUID (ML) ORAL DAILY
Status: DISCONTINUED | OUTPATIENT
Start: 2021-01-13 | End: 2021-01-12

## 2021-01-12 RX ORDER — PANTOPRAZOLE SODIUM 40 MG/1
40 TABLET, DELAYED RELEASE ORAL
Status: DISCONTINUED | OUTPATIENT
Start: 2021-01-13 | End: 2021-01-13 | Stop reason: HOSPADM

## 2021-01-12 RX ORDER — SODIUM CHLORIDE 0.9 % (FLUSH) 0.9 %
5-40 SYRINGE (ML) INJECTION EVERY 8 HOURS
Status: DISCONTINUED | OUTPATIENT
Start: 2021-01-12 | End: 2021-01-13 | Stop reason: HOSPADM

## 2021-01-12 RX ORDER — ACETAMINOPHEN 325 MG/1
650 TABLET ORAL
Status: DISCONTINUED | OUTPATIENT
Start: 2021-01-12 | End: 2021-01-13 | Stop reason: HOSPADM

## 2021-01-12 RX ORDER — VALSARTAN 320 MG/1
320 TABLET ORAL DAILY
Status: DISCONTINUED | OUTPATIENT
Start: 2021-01-13 | End: 2021-01-13 | Stop reason: HOSPADM

## 2021-01-12 RX ORDER — LIDOCAINE HYDROCHLORIDE 10 MG/ML
1-20 INJECTION, SOLUTION EPIDURAL; INFILTRATION; INTRACAUDAL; PERINEURAL ONCE
Status: COMPLETED | OUTPATIENT
Start: 2021-01-12 | End: 2021-01-12

## 2021-01-12 RX ORDER — LEVOTHYROXINE SODIUM 112 UG/1
56 TABLET ORAL
Status: DISCONTINUED | OUTPATIENT
Start: 2021-01-13 | End: 2021-01-13 | Stop reason: HOSPADM

## 2021-01-12 RX ORDER — HEPARIN SODIUM 200 [USP'U]/100ML
2 INJECTION, SOLUTION INTRAVENOUS CONTINUOUS
Status: DISCONTINUED | OUTPATIENT
Start: 2021-01-12 | End: 2021-01-12 | Stop reason: HOSPADM

## 2021-01-12 RX ORDER — INSULIN LISPRO 100 [IU]/ML
10 INJECTION, SOLUTION INTRAVENOUS; SUBCUTANEOUS
Status: DISCONTINUED | OUTPATIENT
Start: 2021-01-13 | End: 2021-01-13 | Stop reason: HOSPADM

## 2021-01-12 RX ADMIN — TICAGRELOR 90 MG: 90 TABLET ORAL at 23:24

## 2021-01-12 RX ADMIN — HYDRALAZINE HYDROCHLORIDE 20 MG: 20 INJECTION INTRAMUSCULAR; INTRAVENOUS at 12:12

## 2021-01-12 RX ADMIN — BIVALIRUDIN 1.75 MG/KG/HR: 250 INJECTION, POWDER, LYOPHILIZED, FOR SOLUTION INTRAVENOUS at 11:30

## 2021-01-12 RX ADMIN — IOPAMIDOL 250 ML: 755 INJECTION, SOLUTION INTRAVENOUS at 12:03

## 2021-01-12 RX ADMIN — MIDAZOLAM 2 MG: 1 INJECTION INTRAMUSCULAR; INTRAVENOUS at 11:00

## 2021-01-12 RX ADMIN — ONDANSETRON 4 MG: 2 INJECTION INTRAMUSCULAR; INTRAVENOUS at 13:47

## 2021-01-12 RX ADMIN — SODIUM CHLORIDE 75 ML/HR: 900 INJECTION, SOLUTION INTRAVENOUS at 09:34

## 2021-01-12 RX ADMIN — SODIUM CHLORIDE 75 ML/HR: 900 INJECTION, SOLUTION INTRAVENOUS at 22:42

## 2021-01-12 RX ADMIN — ROSUVASTATIN CALCIUM 20 MG: 20 TABLET, COATED ORAL at 22:27

## 2021-01-12 RX ADMIN — HEPARIN SODIUM 2 UNITS/HR: 200 INJECTION, SOLUTION INTRAVENOUS at 11:13

## 2021-01-12 RX ADMIN — Medication 5 ML: at 22:28

## 2021-01-12 RX ADMIN — TICAGRELOR 180 MG: 90 TABLET ORAL at 12:04

## 2021-01-12 RX ADMIN — LIDOCAINE HYDROCHLORIDE 15 ML: 10 INJECTION, SOLUTION EPIDURAL; INFILTRATION; INTRACAUDAL; PERINEURAL at 11:08

## 2021-01-12 NOTE — PROGRESS NOTES
Patient received to [address] [place] [address] room # 15  Ambulatory from [facility]. Patient scheduled for [facility] today with Dr Lashay Denton. Procedure reviewed & questions answered, voiced good understanding consent obtained & placed on chart. All medications and medical history reviewed. Will prep patient per orders. Patient & family updated on plan of care. The patient has a fraility score of 3-MANAGING WELL, based on patient A&Ox3, patient able to ambulate to room without difficulty.

## 2021-01-12 NOTE — PROGRESS NOTES
7FR sheath removed from right groin. Manual pressure held for 15 minutes until hemostasis achieved. No bleeding or hematoma noted afterwards. Sterile dressing placed. Pt instructed to keep right leg straight and to remain flat. Pt verbalized understanding of post procedural instructions. Call bell within reach. Will continue to monitor. Hemostasis achieved at 1415.

## 2021-01-12 NOTE — PROGRESS NOTES
Patient complains of sudden onset of nausea from gas. Zofran 4 mg IPV given, no changes noted on EKG, or complaints of chest pain.

## 2021-01-12 NOTE — PROGRESS NOTES
TRANSFER - OUT REPORT:  R&LHC by Dr. Morris Asp  PCI to LAD x2  PCI to OM x1  Angioplasty to Diag  Versed 2mg IV  Brilinta 180mg PO  Hydralazine 20mg IV  Angiomax off @ 1212  RFA closed with Angioseal and covered in tegaderm  RFV 7FR Sheath covered in tegaderm  Access sites soft, clean, dry and intact with no signs of bleeding or hematoma    Verbal report given to Crystal(name) on [employer]  being transferred to CPRU(unit) for routine progression of care       Report consisted of patients Situation, Background, Assessment and   Recommendations(SBAR). Information from the following report(s) Procedure Summary and MAR was reviewed with the receiving nurse. Lines:   Peripheral IV 01/12/21 Left;Posterior Hand (Active)       Peripheral IV 01/12/21 Right Antecubital (Active)        Opportunity for questions and clarification was provided.       Patient transported with:   Registered Nurse

## 2021-01-12 NOTE — H&P
Rachel Whalen MD   Physician   Specialty:  Cardiology   Progress Notes   Signed   Encounter Date:  1/11/2021                    [][place] copied text    []Mark for details  [address], [facility] [address] [facility]  Isabell Diggs, [address] [place] [address]  PHONE: [phone]        Rakesh Gentile  1950     SUBJECTIVE:   Sarah Okeefe is a 72 y.o. male seen for a consultation visit regarding the following:           Chief Complaint   Patient presents with    Shortness of Breath       Consult/had Echo               HPI:  Consultation is requested by Dyke Moritz for evaluation of Shortness of Breath (Consult/had Echo)   .     The patient is a 69-year-old male referred to me for evaluation of dyspnea on exertion and enlarged right ventricle. He was seen by Dr. Sunshine White greater than 3 years ago and had dilated right ventricle at that time no obvious significant pulmonary hypertension. Had a stress echo then when he agent was able to go to stage IV. Since that time he has developed more shortness of breath and dyspnea on exertion which is gotten worse. Family doctor sent him back for his echo which showed a dilated right ventricle RV pressures in the 30s estimated. He had preserved left ventricular systolic function has a dilated left atrium tricuspid regurgitation. He had another nuclear study which could walk stage III but could not go as far did not show any obvious ischemia. He has had no swelling currently. His internist group wants him to have a right heart catheterization. He has sleep apnea on CPAP. They recently checked his sats at night off the CPAP and he dropped below 80% range. He has never been told he had any other lung disease he never smoked. He does not have wheezing. He does have hypertension diabetes significant hyperlipidemia at one time his triglycerides were 1600 but he is on multiple medications.   His father had heart attack in his 62s but lived to be about [de-identified].              Past Medical History, Past Surgical History, Family history, Social History, and Medications were all reviewed with the patient today and updated as necessary.             Outpatient Medications Marked as Taking for the 1/11/21 encounter (Office Visit) with Brady Almonte MD   Medication Sig Dispense Refill    dapagliflozin propanediol (FARXIGA PO) Take  by mouth.        SITagliptin (Januvia) 100 mg tablet Take 100 mg by mouth daily. 1/2 tab daily        valsartan (DIOVAN) 320 mg tablet Take  by mouth daily.        insulin glulisine U-100 (Apidra SoloStar U-100 Insulin) 100 unit/mL pen Inject 10 units + sliding scale according to pre-meal glucose before meals tid 45 mL 3    levothyroxine (SYNTHROID) 112 mcg tablet Take 0.5 Tabs by mouth Daily (before breakfast). 45 Tab 1    metoprolol succinate (TOPROL-XL) 50 mg XL tablet Take 1 Tab by mouth daily. 90 Tab 3    zinc 50 mg tab tablet Take  by mouth daily.        SITagliptin-metFORMIN (Janumet XR)  mg TM24 Take 1 Tab by mouth daily (with breakfast). 30 Tab 0    Tresiba FlexTouch U-200 200 unit/mL (3 mL) inpn INJECT 68 UNITS SUBCUTANEOUSLY ONCE DAILY 27 mL 5    rosuvastatin (CRESTOR) 20 mg tablet Take 1 Tab by mouth nightly. 90 Tab 1    omeprazole (PRILOSEC) 20 mg capsule Take 1 Cap by mouth daily. 90 Cap 3    fenofibrate micronized (LOFIBRA) 134 mg capsule Take 1 Cap by mouth nightly. 90 Cap 1    SITagliptin-metFORMIN (JANUMET XR)  mg TM24 Take 1 Tab by mouth daily (with breakfast). 90 Tab 3    glucose blood VI test strips (ACCU-CHEK ASHLEY PLUS TEST STRP) strip Use to check glucose tid. Dx: E11.65, I10 300 Strip 3    lancets (ACCU-CHEK SOFTCLIX LANCETS) misc Use to check glucose tid. E11.65, I10 300 Each 3    Lancing Device with Lancets (ACCU-CHEK SOFT DEV LANCETS) kit 200 Each by Does Not Apply route two (2) times a day.  DX: E11.9 1 Kit 0    Blood-Glucose Meter (ACCU-CHEK ASHLEY PLUS METER) misc Use twice daily to check blood sugar 1 Each 0    Blood Glucose Control High&Low (ACCU-CHEK ASHLEY CONTROL SOLN) soln Use as directed 1 Bottle 6    aspirin delayed-release 81 mg tablet Take  by mouth daily.                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/2019    GERD (gastroesophageal reflux disease)      Hyperlipidemia      Hypertension       essential    Obstructive sleep apnea on CPAP      Squamous cell carcinoma of head and neck (HCC) 06/2020     Right Hairline    Type 2 diabetes mellitus with hyperglycemia ([address] 10/28/2015            Past Surgical History:   Procedure Laterality Date    COLONOSCOPY N/A 10/16/2017     COLONOSCOPY performed by Romana Andes, MD at [facility] ENDOSCOPY    HX ENDOSCOPY   06/19/2009     Hiatal Hernia    HX TONSILLECTOMY                Family History   Problem Relation Age of Onset    Heart Disease Father 61    Lung Disease Brother      No Known Problems Brother        Social History           Tobacco Use    Smoking status: Never Smoker    Smokeless tobacco: Never Used   Substance Use Topics    Alcohol use: No         ROS:     Review of Systems   Constitution: Negative for weight loss. HENT: Negative for congestion. Eyes: Negative for blurred vision. Cardiovascular: Positive for dyspnea on exertion. Negative for chest pain and irregular heartbeat.            PHYSICAL EXAM:     Visit Vitals  BP (!) 140/76   Pulse 60   Ht 5' 6\" (1.676 m)   Wt 202 lb 12.8 oz (92 kg)   BMI 32.73 kg/m²         Physical Exam   Constitutional: He appears well-developed and well-nourished. No distress. Eyes: Pupils are equal, round, and reactive to light.    Neck: Normal range of motion. Cardiovascular: Normal rate and regular rhythm. Exam reveals no gallop. Murmur heard. Systolic murmur is present with a grade of 1/6. Pulmonary/Chest: Effort normal. He has no wheezes. He has no rales. Abdominal: Soft. There is no abdominal tenderness. Musculoskeletal:         General: No edema. Neurological: He is alert. Coordination normal.   Skin: Skin is warm.         Medical problems and test results were reviewed with the patient today.          Results for orders placed or performed in visit on 01/11/21   AMB POC EKG ROUTINE W/ 12 LEADS, INTER & REP     Impression     Normal sinus rhythm rate of 60. Normal intervals. No significant ST change               Lab Results   Component Value Date/Time     Sodium 141 10/27/2020 11:09 AM     Potassium 4.8 10/27/2020 11:09 AM     Chloride 109 (H) 10/27/2020 11:09 AM     CO2 26 10/27/2020 11:09 AM     Anion gap 6 (L) 10/27/2020 11:09 AM     Glucose 122 (H) 10/27/2020 11:09 AM     BUN 22 10/27/2020 11:09 AM     Creatinine 1.19 10/27/2020 11:09 AM     BUN/Creatinine ratio 11 09/06/2019 12:39 PM     GFR est AA >60 10/27/2020 11:09 AM     GFR est non-AA >60 10/27/2020 11:09 AM     Calcium 9.1 10/27/2020 11:09 AM            Lab Results   Component Value Date/Time     Cholesterol, total 145 10/27/2020 11:09 AM     HDL Cholesterol 51 10/27/2020 11:09 AM     LDL,Direct 134 (H) 10/02/2019 02:15 PM     LDL, calculated 68.4 10/27/2020 11:09 AM     VLDL, calculated 25.6 (H) 10/27/2020 11:09 AM     Triglyceride 128 10/27/2020 11:09 AM     CHOL/HDL Ratio 2.8 10/27/2020 11:09 AM            Lab Results   Component Value Date/Time     ALT (SGPT) 49 10/27/2020 11:09 AM     Alk. phosphatase 36 (L) 10/27/2020 11:09 AM     Bilirubin, total 0.4 10/27/2020 11:09 AM   I reviewed the patient's echo myself shows a normal LV systolic function with dilated left atrium. Dilated right ventricle. The tricuspid regurgitation.   Looked at his chest x-ray that shows cardiomegaly but fairly clear lung fields. Previous nuclear study in December showed no definite ischemia  ASSESSMENT and PLAN     Diagnoses and all orders for this visit:     1. Shortness of breath patient is having worsening shortness of breath and dyspnea exertion which is gotten worse this year he states. He cannot walk as far as he did on the treadmill. He has had no history of significant lung disease other than sleep apnea. He has a dilated right ventricle which we not sure exactly why is been noted on multiple echoes dating back several years. Is been no obvious shunt detected. Family doctor for him over for least a right heart cath. Certainly that is a reasonable thing to do is to measure pressures look for pulmonary hypertension and looking for any type of shunting that could have been present before. I think with his risk factors and coronary disease risk including dad and all the other things I think a left heart cath would be indicated as well some of this could be all from left sided pressure overload because his left atrium significantly elevated. Discussed this with him and he is agreeable to do the heart catheterization. Discussed how we do it even talked about the possible need of intervention or angioplasty and stenting showed him diagrams. We discussed the risk of bleeding stroke heart attack death etc.  -     AMB POC EKG ROUTINE W/ 12 LEADS, INTER & REP  -     COMB R&L HEART CATH; Future  -     PFT COMPLETE; Future     2. Right heart enlargement patient got enlarged right ventricle which has been present for some time.  -     COMB R&L HEART CATH; Future  -     PFT COMPLETE; Future     3. CAMERON (dyspnea on exertion) worsening dyspnea on exertion as noted above. -     COMB R&L HEART CATH; Future  -     PFT COMPLETE; Future  -     CBC WITH AUTOMATED DIFF; Future  -     METABOLIC PANEL, BASIC; Future     4. NAVA (obstructive sleep apnea) he does need to use his CPAP.   Also I think we need to get pulmonary function studies on it     5. Mixed dyslipidemia he had a mixed hyperlipidemia his high triglycerides in the 1600 she is on combination therapy LDL level still in the 90s we will may need to adjust that side.     6. Hypertension due to endocrine disorder he has had significant hypertension on multiple medications which will continue I think measure left heart pressures and all is a reasonable thing to do as well with a catheterization  -     COMB R&L HEART CATH; Future  -     METABOLIC PANEL, BASIC; Future     7. Type 2 diabetes mellitus with hyperglycemia, with long-term current use of insulin ([address] he has had diabetes and is followed by his family doctor on insulin currently  -     COMB R&L HEART CATH; Future     8. Obesity (BMI 30-39. 9) I discussed with him this and weight loss which he needs to do.     We will set him up for the pulmonary function studies the heart catheterization I will see him back after the above have gone over this with him discussed and he is agreeable.           Follow-up and Dispositions  ·   Return in about 1 month (around 2/11/2021).                  Thank you for allowing me to participate in this patient's care.   Please call or contact me if there are any questions or concerns regarding the above.       Arlyn Osorio MD  01/11/21  8:56 AM      Electronically signed by Chester Valdovinos MD at 01/11/21 9394  Note Details    Author Chester Valdovinos MD File Time 01/11/21 9013   Author Type Physician Status Signed   Last  Chester Valdovinos MD Specialty Cardiology      Office Visit on 1/11/2021        Detailed Report        Note viewed by patient

## 2021-01-12 NOTE — PROGRESS NOTES
Report received from Jefe E Judith Pelletier Procedural findings communicated. Intra procedural  medication administration reviewed. Progression of care discussed. Patient received into [address] [facility] 5 post sheath removal.     Access site without bleeding or swelling yes    Dressing dry and intact yes    Patient instructed to limit movement to right lower extremity.     Routine post procedural vital signs and site assessment initiated yes

## 2021-01-12 NOTE — PROCEDURES
[address] [facility]  CARDIAC CATH    Name:  Luanne Gr  MR#:  [id number]  :  1950  ACCOUNT #:  [de-identified]  DATE OF SERVICE:  2021    PROCEDURES PERFORMED:  Left heart cath, right heart cath, selective coronary angiography, multivessel intervention. PREOPERATIVE DIAGNOSES:  Shortness of breath, angina. POSTOPERATIVE DIAGNOSES:  cad    SURGEON:  Monse Phillips MD    ASSISTANT:  ghislaine    ESTIMATED BLOOD LOSS:  10 mL    SPECIMENS REMOVED:  na    COMPLICATIONS:  None. IMPLANTS:  Drug-eluting stents. ANESTHESIA:  2 mg of Versed given between 11:00 and 12:10. The nurse was Yifan Granados. INDICATION:  Shortness of breath and chest pain. ACCESS:  Right femoral arterial and venous access. CATHETERS USED:  Sarah Beth left 4, Sarah Beth right 4, straight pigtail were used for diagnostic images. Bovey-Shani was used. Intervention was done with a Q4 guide and a Prowater wire. Angio-Seal was done. CONTRAST:  250 mL of contrast were used    FINDINGS:  Right heart cath demonstrates right atrial pressure A-wave 15, V-wave 10, mean of 8. RV pressure systolic 33, diastolic 11. PA pressure systolic 34, diastolic 12. Pulmonary capillary wedge pressure A-wave 15, V-wave 11, mean of 9. LV pressure 123/40. Aortic pressure 107/74. Cardiac output 4.77 liters per minute. Saturations:  High atrium 63, low atrium 63%, RV 65%, PA 66%. Left ventriculogram done in [facility] projection shows EF 60%, LVEDP of 15. Left main arises normally, bifurcates into LAD and circumflex. Left main has no disease. LAD has proximal high-grade stenosis in and around the takeoff of two small diagonals. The overall length of the plaque is probably 20-25 mm. The mid distal LAD is smaller caliber with no significant disease. One of the diagonals that is approximately 1.5 mm in size has 99% ostial stenosis. Circumflex artery coursing in the AV groove supplies two OMs. The first OM has an 80% proximal lesion. The remainder of the circumflex and second OM have minimal disease. Right coronary artery arises off the right cusp, courses in the AV groove, and has mild nonobstructive disease. The patient is given therapeutic dose of Angiomax and a Prowater wire was placed initially into the LAD. Balloon angioplasty and stenting of the LAD is performed with a 2.5 x 26 and a 2.75 x 8 [place] stent placed in contiguous fashion and dilated to high pressure. Next, the jailed diagonal 1 was crossed into and balloon angioplasty of the ostium was performed with a 1.5 x 12 balloon. There was 10% residual from the original 99% stenosis. Next, the OM1 was wired and direct stented with a 2.25 x 15 stent to 24 atmospheres with excellent results. CONCLUSIONS:  1. Coronary artery disease with stenting of an LAD, PTCA of a diagonal, and stenting of an OM. 2.  Preserved left ventricular systolic function. 3.  Minimally elevated pulmonary artery pressures with normal cardiac output. The patient underwent successful Angio-Seal.  There were no complications and will be treated with dual antiplatelet therapy.       MD SANDRO [id number]  D:  01/12/2021 12:30  T:  01/12/2021 13:04  JOB #:  [id number]

## 2021-01-12 NOTE — PROGRESS NOTES
BP dropped to 78/45 during right groin venous sheath pull, NS bolus given.     1415 Patient responded well to NS bolus, /56

## 2021-01-12 NOTE — PROCEDURES
Cardiac Catheterization Procedure Note    Patient ID:     Name: Arthur Gentile   Medical Record Number: [id number]   YOB: 1950    Date of Procedure: 1/12/2021     Pre-procedure Diagnosis:  Typical Angina    Post-procedure Diagnosis: Coronary Artery Disease    Reason for Procedure: Worsening Angina    Blood loss less than 5 ml    Sedation. Pt received 2 mg versed and 0 mcg fentanyl for monitored conscious sedation from 1100to 1210. independent trained observer to assist in the monitoring of the patient's level of consciousness and physiological status was present    Nurse dio    Specimen: None    No complications    No assistants    Time out, Mallampati, and ASA performed    Procedure:  After informed consent, patient was prepped and draped in the usual sterile fashion. Right femoral approach was used. 250cc Visipaque contrast were utilized for the entire procedure. angioseal closure device used        FINDINGS    Left Ventricle: 60  LVEDP: 15    Left Main:ok    Left Anterior descending coronary artery: prox 90% diag 99%       Left Circumflex coronary artery: om1 80%        Right coronary artery: mild disease            Graft anatomy: na    Intervention if done: stent x 2 to lad. ptca to jailed small diag. Stent to om1    Conclusions: mild pul htn by [facility]    Recommentations: dapt    No complications    Family and or significant other were sought out and discussion of the procedure and findings took place. Procedure and findings including pertinent sequele were discussed with the patient immediately post procedure. Opportunity to ask questions was offered. If no one was available in the post procedure waiting area, I can be reached thru paging system or at [phone].           Signed By: Ciara Law MD

## 2021-01-13 VITALS
WEIGHT: 201.2 LBS | BODY MASS INDEX: 32.33 KG/M2 | OXYGEN SATURATION: 94 % | SYSTOLIC BLOOD PRESSURE: 135 MMHG | HEART RATE: 71 BPM | RESPIRATION RATE: 18 BRPM | TEMPERATURE: 98.1 F | DIASTOLIC BLOOD PRESSURE: 77 MMHG | HEIGHT: 66 IN

## 2021-01-13 LAB
ANION GAP SERPL CALC-SCNC: 5 MMOL/L (ref 7–16)
BUN SERPL-MCNC: 26 MG/DL (ref 8–23)
CALCIUM SERPL-MCNC: 8.4 MG/DL (ref 8.3–10.4)
CHLORIDE SERPL-SCNC: 112 MMOL/L (ref 98–107)
CO2 SERPL-SCNC: 27 MMOL/L (ref 21–32)
CREAT SERPL-MCNC: 1.3 MG/DL (ref 0.8–1.5)
ERYTHROCYTE [DISTWIDTH] IN BLOOD BY AUTOMATED COUNT: 13 % (ref 11.9–14.6)
GLUCOSE BLD STRIP.AUTO-MCNC: 156 MG/DL (ref 65–100)
GLUCOSE SERPL-MCNC: 146 MG/DL (ref 65–100)
HCT VFR BLD AUTO: 35.9 % (ref 41.1–50.3)
HGB BLD-MCNC: 11.8 G/DL (ref 13.6–17.2)
MCH RBC QN AUTO: 28.9 PG (ref 26.1–32.9)
MCHC RBC AUTO-ENTMCNC: 32.9 G/DL (ref 31.4–35)
MCV RBC AUTO: 88 FL (ref 79.6–97.8)
NRBC # BLD: 0 K/UL (ref 0–0.2)
PLATELET # BLD AUTO: 157 K/UL (ref 150–450)
PMV BLD AUTO: 11.5 FL (ref 9.4–12.3)
POTASSIUM SERPL-SCNC: 4.3 MMOL/L (ref 3.5–5.1)
RBC # BLD AUTO: 4.08 M/UL (ref 4.23–5.6)
SODIUM SERPL-SCNC: 144 MMOL/L (ref 136–145)
WBC # BLD AUTO: 7.2 K/UL (ref 4.3–11.1)

## 2021-01-13 PROCEDURE — 74011250637 HC RX REV CODE- 250/637: Performed by: NURSE PRACTITIONER

## 2021-01-13 PROCEDURE — 85027 COMPLETE CBC AUTOMATED: CPT

## 2021-01-13 PROCEDURE — 36415 COLL VENOUS BLD VENIPUNCTURE: CPT

## 2021-01-13 PROCEDURE — 99217 PR OBSERVATION CARE DISCHARGE MANAGEMENT: CPT | Performed by: INTERNAL MEDICINE

## 2021-01-13 PROCEDURE — 80048 BASIC METABOLIC PNL TOTAL CA: CPT

## 2021-01-13 PROCEDURE — 82962 GLUCOSE BLOOD TEST: CPT

## 2021-01-13 RX ORDER — GUAIFENESIN 100 MG/5ML
81 LIQUID (ML) ORAL DAILY
Status: DISCONTINUED | OUTPATIENT
Start: 2021-01-13 | End: 2021-01-13 | Stop reason: HOSPADM

## 2021-01-13 RX ADMIN — Medication 5 ML: at 06:16

## 2021-01-13 RX ADMIN — ALOGLIPTIN 12.5 MG: 12.5 TABLET, FILM COATED ORAL at 09:00

## 2021-01-13 RX ADMIN — METOPROLOL SUCCINATE 50 MG: 50 TABLET, EXTENDED RELEASE ORAL at 09:00

## 2021-01-13 RX ADMIN — LEVOTHYROXINE SODIUM 56 MCG: 0.11 TABLET ORAL at 09:00

## 2021-01-13 RX ADMIN — PANTOPRAZOLE SODIUM 40 MG: 40 TABLET, DELAYED RELEASE ORAL at 09:00

## 2021-01-13 RX ADMIN — ASPIRIN 81 MG: 81 TABLET, CHEWABLE ORAL at 09:00

## 2021-01-13 RX ADMIN — VALSARTAN 320 MG: 320 TABLET, FILM COATED ORAL at 09:00

## 2021-01-13 RX ADMIN — FENOFIBRATE 160 MG: 160 TABLET ORAL at 09:00

## 2021-01-13 NOTE — PROGRESS NOTES
I have reviewed discharge instructions with the patient. The patient verbalized understanding. Pt educated on care for right groin cath site and new medication (Patricia Block). Pt has no questions or concerns at this time.

## 2021-01-13 NOTE — DISCHARGE INSTRUCTIONS
Patient Education   No lifting of 10 lbs or more for 7 days, no tub baths for a week, may shower, no creams, lotions powders, or ointments to site for a week. A Healthy Heart: Care Instructions  Your Care Instructions     Coronary artery disease, also called heart disease, occurs when a substance called plaque builds up in the vessels that supply oxygen-rich blood to your heart muscle. This can narrow the blood vessels and reduce blood flow. A heart attack happens when blood flow is completely blocked. A high-fat diet, smoking, and other factors increase the risk of heart disease. Your doctor has found that you have a chance of having heart disease. You can do lots of things to keep your heart healthy. It may not be easy, but you can change your diet, exercise more, and quit smoking. These steps really work to lower your chance of heart disease. Follow-up care is a key part of your treatment and safety. Be sure to make and go to all appointments, and call your doctor if you are having problems. It's also a good idea to know your test results and keep a list of the medicines you take. How can you care for yourself at home? Diet    · Use less salt when you cook and eat. This helps lower your blood pressure. Taste food before salting. Add only a little salt when you think you need it. With time, your taste buds will adjust to less salt.     · Eat fewer snack items, fast foods, canned soups, and other high-salt, high-fat, processed foods.     · Read food labels and try to avoid saturated and trans fats. They increase your risk of heart disease by raising cholesterol levels.     · Limit the amount of solid fat-butter, margarine, and shortening-you eat. Use olive, peanut, or canola oil when you cook. Bake, broil, and steam foods instead of frying them.     · Eat a variety of fruit and vegetables every day. Dark green, deep orange, red, or yellow fruits and vegetables are especially good for you.  Examples include spinach, carrots, peaches, and berries.     · Foods high in fiber can reduce your cholesterol and provide important vitamins and minerals. High-fiber foods include whole-grain cereals and breads, oatmeal, beans, brown rice, citrus fruits, and apples.     · Eat lean proteins. Heart-healthy proteins include seafood, lean meats and poultry, eggs, beans, peas, nuts, seeds, and soy products.     · Limit drinks and foods with added sugar. These include candy, desserts, and soda pop. Lifestyle changes    · If your doctor recommends it, get more exercise. Walking is a good choice. Bit by bit, increase the amount you walk every day. Try for at least 30 minutes on most days of the week. You also may want to swim, bike, or do other activities.     · Do not smoke. If you need help quitting, talk to your doctor about stop-smoking programs and medicines. These can increase your chances of quitting for good. Quitting smoking may be the most important step you can take to protect your heart. It is never too late to quit.     · Limit alcohol to 2 drinks a day for men and 1 drink a day for women. Too much alcohol can cause health problems.     · Manage other health problems such as diabetes, high blood pressure, and high cholesterol. If you think you may have a problem with alcohol or drug use, talk to your doctor. Medicines    · Take your medicines exactly as prescribed. Call your doctor if you think you are having a problem with your medicine.     · If your doctor recommends aspirin, take the amount directed each day. Make sure you take aspirin and not another kind of pain reliever, such as acetaminophen (Tylenol). When should you call for help? Call 911 if you have symptoms of a heart attack.  These may include:    · Chest pain or pressure, or a strange feeling in the chest.     · Sweating.     · Shortness of breath.     · Pain, pressure, or a strange feeling in the back, neck, jaw, or upper belly or in one or both shoulders or arms.     · Lightheadedness or sudden weakness.     · A fast or irregular heartbeat. After you call 911, the  may tell you to chew 1 adult-strength or 2 to 4 low-dose aspirin. Wait for an ambulance. Do not try to drive yourself. Watch closely for changes in your health, and be sure to contact your doctor if you have any problems. Where can you learn more? Go to http://www.weaver.com/  Enter F075 in the search box to learn more about \"A Healthy Heart: Care Instructions. \"  Current as of: December 16, 2019               Content Version: 12.6  © 8475-2161 [employer] [place]. Care instructions adapted under license by [employer] (which disclaims liability or warranty for this information). If you have questions about a medical condition or this instruction, always ask your healthcare professional. [address] any warranty or liability for your use of this information. Coronary Angioplasty: What to Expect at [facility]     Coronary angioplasty is a procedure that is used to open a narrowed or blocked coronary artery. It may also be called a percutaneous coronary intervention (PCI). The doctor opened your narrowed or blocked artery by putting a thin tube, called a catheter, into your heart through a blood vessel. The catheter was inserted into the blood vessel in your groin or arm. Your groin or arm may have a bruise and feel sore for a day or two after the procedure. You can do light activities around the house. But don't do anything strenuous for several days. This care sheet gives you a general idea about how long it will take for you to recover. But each person recovers at a different pace. Follow the steps below to get better as quickly as possible. How can you care for yourself at home? Activity    · If the doctor gave you a sedative:  ?  For 24 hours, don't do anything that requires attention to detail, such as going to work, making important decisions, or signing any legal documents. It takes time for the medicine's effects to completely wear off.  ? For your safety, do not drive or operate any machinery that could be dangerous. Wait until the medicine wears off and you can think clearly and react easily.     · Do not do strenuous exercise and do not lift, pull, or push anything heavy until your doctor says it is okay. This may be for a day or two. You can walk around the house and do light activity, such as cooking.     · If the catheter was placed in your groin, try not to walk up stairs for the first couple of days.     · If the catheter was placed in your arm near your wrist, do not bend your wrist deeply for the first couple of days. Be careful using your hand to get into and out of a chair or bed.     · Carry your stent identification card with you at all times.     · If your doctor recommends it, get more exercise. Walking is a good choice. Bit by bit, increase the amount you walk every day. Try for at least 30 minutes on most days of the week. Diet    · Drink plenty of fluids to help your body flush out the dye. If you have kidney, heart, or liver disease and have to limit fluids, talk with your doctor before you increase the amount of fluids you drink.     · Keep eating a heart-healthy diet that has lots of fruits, vegetables, and whole grains. If you have not been eating this way, talk to your doctor. You also may want to talk to a dietitian. This expert can help you to learn about healthy foods and plan meals. Medicines    · Your doctor will tell you if and when you can restart your medicines. He or she will also give you instructions about taking any new medicines.     · If you take aspirin or some other blood thinner, ask your doctor if and when to start taking it again.  Make sure that you understand exactly what your doctor wants you to do.     · Your doctor will prescribe blood-thinning medicines. You will likely take aspirin plus another antiplatelet, such as clopidogrel (Plavix). It is very important that you take these medicines exactly as directed. These medicines help keep the coronary artery open and reduce your risk of a heart attack.     · Call your doctor if you think you are having a problem with your medicine. Care of the catheter site    · For 1 or 2 days, keep a bandage over the spot where the catheter was inserted. The bandage probably will fall off in this time.     · Put ice or a cold pack on the area for 10 to 20 minutes at a time to help with soreness or swelling. Put a thin cloth between the ice and your skin.     · You may shower 24 to 48 hours after the procedure, if your doctor okays it. Pat the incision dry.     · Do not soak the catheter site until it is healed. Don't take a bath for 1 week, or until your doctor tells you it is okay.     · Watch for bleeding from the site. A small amount of blood (up to the size of a quarter) on the bandage can be normal.     · If you are bleeding, lie down and press on the area for 15 minutes to try to make it stop. If the bleeding does not stop, call your doctor or seek immediate medical care. Follow-up care is a key part of your treatment and safety. Be sure to make and go to all appointments, and call your doctor if you are having problems. It's also a good idea to know your test results and keep a list of the medicines you take. When should you call for help? Call 911 anytime you think you may need emergency care. For example, call if:    · You passed out (lost consciousness).     · You have severe trouble breathing.     · You have sudden chest pain and shortness of breath, or you cough up blood.     · You have symptoms of a heart attack, such as:  ? Chest pain or pressure. ? Sweating. ? Shortness of breath. ? Nausea or vomiting.   ? Pain that spreads from the chest to the neck, jaw, or one or both shoulders or arms.  ? Dizziness or lightheadedness. ? A fast or uneven pulse. After calling 911, chew 1 adult-strength aspirin. Wait for an ambulance. Do not try to drive yourself.     · You have been diagnosed with angina, and you have angina symptoms that do not go away with rest or are not getting better within 5 minutes after you take one dose of nitroglycerin. Call your doctor now or seek immediate medical care if:    · You are bleeding from the area where the catheter was put in your artery.     · You have a fast-growing, painful lump at the catheter site.     · You have signs of infection, such as:  ? Increased pain, swelling, warmth, or redness. ? Red streaks leading from the catheter site. ? Pus draining from the catheter site. ? A fever.     · Your leg, arm, or hand is painful, looks blue, or feels cold, numb, or tingly. Watch closely for changes in your health, and be sure to contact your doctor if you have any problems. Where can you learn more? Go to http://www.weaver.com/  Enter M633 in the search box to learn more about \"Coronary Angioplasty: What to Expect at Home. \"  Current as of: December 16, 2019               Content Version: 12.6  © 4771-7225 [employer]. Care instructions adapted under license by [employer] (which disclaims liability or warranty for this information). If you have questions about a medical condition or this instruction, always ask your healthcare professional. [place] [address] any warranty or liability for your use of this information. Patient Education   Ticagrelor (By mouth)   Ticagrelor ([id number])  Helps prevent stroke, heart attack, and other heart problems. This medicine is a blood thinner. Brand Name(s): Brilinta   There may be other brand names for this medicine. When This Medicine Should Not Be Used: This medicine is not right for everyone.  Do not use it if you had an allergic reaction to ticagrelor, or if you have bleeding problems (such as a bleeding stomach ulcer) or a history of bleeding in your brain. How to Use This Medicine:   Tablet  · Your doctor will tell you how much medicine to use. Do not use more than directed. Take this medicine at the same time every day. · Your doctor may tell you to take aspirin with this medicine. Do not use more than 100 milligrams of aspirin per day. Check the labels of other medicines to make sure they do not contain aspirin. · If you cannot swallow the tablet, you may do this:   ¨ Crush the tablet and mix it in a glass of water. Drink it right away. Rinse the glass with more water and drink that too, so you get all the medicine. ¨ You may give the tablet and water mixture through a nasogastric tube. Flush the tube with more water so you receive all the medicine. · This medicine should come with a Medication Guide. Ask your pharmacist for a copy if you do not have one. · Missed dose: Skip the missed dose and take your next dose as usual. Do not take extra medicine to make up for a missed dose. · Store the medicine in a closed container at room temperature, away from heat, moisture, and direct light. Drugs and Foods to Avoid:   Ask your doctor or pharmacist before using any other medicine, including over-the-counter medicines, vitamins, and herbal products. · Some medicines can affect how ticagrelor works.  Tell your doctor if you are using any of the following:  ¨ Atazanavir, carbamazepine, clarithromycin, digoxin, indinavir, itraconazole, ketoconazole, lovastatin, nefazodone, nelfinavir, phenobarbital, phenytoin, rifampin, ritonavir, saquinavir, simvastatin, telithromycin, or voriconazole  ¨ Blood thinner (including warfarin or heparin)  ¨ NSAID pain or arthritis medicine (including celecoxib, diclofenac, ibuprofen, naproxen)  Warnings While Using This Medicine:   · Tell your doctor if you are pregnant or breastfeeding, or if you have liver disease, heart rhythm problems (including slow heartbeat), lung or breathing problems (such as asthma or COPD), or a history of bleeding problems. · This medicine may cause you to bleed and bruise more easily, and it may take longer than usual for bleeding to stop. Be careful to avoid injuries. · Do not stop using this medicine unless your doctor tells you to. To stop it may increase your risk of a heart attack, blood clot, or other serious problem. · Tell any doctor or dentist who treats you that you are using this medicine. With your doctor's permission, you may need to stop using this medicine several days before you have surgery to reduce the risk of bleeding problems. Follow your doctor's instructions carefully. · Your doctor will do lab tests at regular visits to check on the effects of this medicine. Keep all appointments. · Keep all medicine out of the reach of children. Never share your medicine with anyone. Possible Side Effects While Using This Medicine:   Call your doctor right away if you notice any of these side effects:  · Allergic reaction: Itching or hives, swelling in your face or hands, swelling or tingling in your mouth or throat, chest tightness, trouble breathing  · Bloody or black, tarry stools, red or dark brown urine  · Fast, slow, or pounding heartbeat  · Trouble breathing  · Unusual bleeding, bruising, or weakness  · Vomiting of blood or material that looks like coffee grounds  If you notice other side effects that you think are caused by this medicine, tell your doctor. Call your doctor for medical advice about side effects. You may report side effects to FDA at 7-208-FDA-4828  © 2017 [address] Information is for End User's use only and may not be sold, redistributed or otherwise used for commercial purposes. The above information is an  only. It is not intended as medical advice for individual conditions or treatments.  Talk to your doctor, nurse or pharmacist before following any medical regimen to see if it is safe and effective for you.

## 2021-01-13 NOTE — ROUTINE PROCESS
Bedside and Verbal shift change report given to Ezequiel 65 (oncoming nurse) by self (offgoing nurse). Report included the following information SBAR, Kardex, MAR and Recent Results.

## 2021-01-13 NOTE — PROGRESS NOTES
TRANSFER - OUT REPORT:    Verbal report given to Dimple CRUZ on [employer]  being transferred to [facility](unit) for routine progression of care       Report consisted of patients Situation, Background, Assessment and   Recommendations(SBAR). Information from the following report(s) Procedure Summary was reviewed with the receiving nurse. Lines:   Peripheral IV 01/12/21 Left;Posterior Hand (Active)       Peripheral IV 01/12/21 Right Antecubital (Active)        Opportunity for questions and clarification was provided.       Patient transported with:   Registered Nurse

## 2021-01-13 NOTE — DISCHARGE SUMMARY
[address] [place] [address] 121 Cardiology Discharge Summary     Patient ID:  Karma Molina  [id number]  60 y.o.  1950    Admit date: 1/12/2021    Discharge date:  1/13/2021    Admitting Physician: Bernard Pan MD     Discharge Physician: Dr. Lula Chowdhury    Admission Diagnoses: SOB (shortness of breath) [R06.02]  CAD (coronary artery disease) [I25.10]    Discharge Diagnoses:    Diagnosis    CAD (coronary artery disease)    Obesity (BMI 30-39. 9)    Mixed dyslipidemia    Hypertension    Type 2 diabetes mellitus with hyperglycemia [facility])       Cardiology Procedures this admission:  Left heart catheterization with PCI, RHC  Consults: None    Hospital Course: Patient was seen at the office of [address] [place] [address] Cardiology by Dr. Yovana Covington for complaints of dyspnea with exertion and was subsequently scheduled for an AM Admission [facility] on 1/12/21. Patient underwent cardiac catheterization by Dr. Lula Chowdhury. Patient was found to have 90% stenosis of the proximal LAD that was stented with a 2.5 x 26-mm and 2.75 x 8-mm Resolute Cheikh JUAN RAMON with 0% residual stenosis. Patient was also found to have 99% stenosis of the diagonal that was treated with POBA with 10% residual stenosis. Patient was also found to have 80% stenosis of OM1 that was stented with 2.25 x 15-mm Resolute Cheikh JUAN RAMON with 0% residual stenosis. Right heart cath demonstrated right atrial pressure A-wave 15, V-wave 10, mean of 8. RV pressure systolic 33, diastolic 11. PA pressure systolic 34, diastolic 12. Pulmonary capillary wedge pressure A-wave 15, V-wave 11, mean of 9. LV pressure 123/40. Aortic pressure 107/74. Cardiac output 4.77 liters per minute. Saturations:  High atrium 63, low atrium 63%, RV 65%, PA 66%. Patient tolerated the procedure well and was taken to the telemetry floor for recovery. The morning of discharge, patient was up feeling well without any complaints of chest pain or shortness of breath.  Patient's right groin cath site was clean, dry and intact without hematoma or bruit. Patient's labs were WNL. Patient was seen and examined by Dr. Benji Matthews and determined stable and ready for discharge. Patient was instructed on the importance of medication compliance including taking aspirin and Brilinta everyday without missing a dose. After receiving drug eluting stents, the patient will remain on dual anti-platelet therapy for 1 year. For maximized medical therapy for CAD, patient will continue BB, ARB, and statin as well. The patient will follow up with [address] [place] [address] Cardiology -- Dr. Antonina Valadez in 2-4 weeks. Patient has been referred to cardiac rehab. DISPOSITION: The patient is being discharged home in stable condition on a low saturated fat, low cholesterol and low salt diet. The patient is instructed to advance activities as tolerated to the limit of fatigue or shortness of breath. The patient is instructed to avoid all heavy lifting, straining, stooping or squatting for 3-5 days. The patient is instructed to watch the cath site for bleeding/oozing; if seen, the patient is instructed to apply firm pressure with a clean cloth and call [address] [place] [address] Cardiology at [phone]. The patient is instructed to watch for signs of infection which include: increasing area of redness, fever/hot to touch or purulent drainage at the catheterization site. The patient is instructed not to soak in a bathtub for 7-10 days, but is cleared to shower. The patient is instructed to call the office or return to the ER for immediate evaluation for any shortness of breath or chest pain not relieved by NTG. Discharge Exam: Patient has been seen by Dr. Benji Matthews: see his progress note for exam details.     Visit Vitals  /63 (BP 1 Location: Left arm, BP Patient Position: At rest;Lying right side)   Pulse 63   Temp 98 °F (36.7 °C)   Resp 20   Ht 5' 6\" (1.676 m)   Wt 91.3 kg (201 lb 3.2 oz)   SpO2 94%   BMI 32.47 kg/m²       Recent Results (from the past 24 hour(s))   EKG, 12 LEAD, INITIAL    Collection Time: 01/12/21  9:43 AM   Result Value Ref Range    Ventricular Rate 61 BPM    Atrial Rate 61 BPM    P-R Interval 184 ms    QRS Duration 100 ms    Q-T Interval 418 ms    QTC Calculation (Bezet) 420 ms    Calculated P Axis 47 degrees    Calculated R Axis 22 degrees    Calculated T Axis 47 degrees    Diagnosis       Normal sinus rhythm  Normal ECG  When compared with ECG of 20-JUN-2009 01:19,  T wave inversion no longer evident in Inferior leads  Confirmed by Jeff Malik MD (), VIVIAN ALLEN ([id number]) on 1/12/2021 10:20:50 AM     GLUCOSE, POC    Collection Time: 01/12/21  2:15 PM   Result Value Ref Range    Glucose (POC) 159 (H) 65 - 100 mg/dL   GLUCOSE, POC    Collection Time: 01/12/21  5:04 PM   Result Value Ref Range    Glucose (POC) 229 (H) 65 - 100 mg/dL   GLUCOSE, POC    Collection Time: 01/12/21  9:21 PM   Result Value Ref Range    Glucose (POC) 232 (H) 65 - 100 mg/dL   CBC W/O DIFF    Collection Time: 01/13/21  3:35 AM   Result Value Ref Range    WBC 7.2 4.3 - 11.1 K/uL    RBC 4.08 (L) 4.23 - 5.6 M/uL    HGB 11.8 (L) 13.6 - 17.2 g/dL    HCT 35.9 (L) 41.1 - 50.3 %    MCV 88.0 79.6 - 97.8 FL    MCH 28.9 26.1 - 32.9 PG    MCHC 32.9 31.4 - 35.0 g/dL    RDW 13.0 11.9 - 14.6 %    PLATELET 601 615 - 088 K/uL    MPV 11.5 9.4 - 12.3 FL    ABSOLUTE NRBC 0.00 0.0 - 0.2 K/uL         Patient Instructions:     Current Discharge Medication List      START taking these medications    Details   ticagrelor (BRILINTA) 90 mg tablet Take 1 Tab by mouth every twelve (12) hours every twelve (12) hours. Qty: 60 Tab, Refills: 11         CONTINUE these medications which have NOT CHANGED    Details   dapagliflozin propanediol (FARXIGA PO) Take 5 mg by mouth.      valsartan (DIOVAN) 320 mg tablet Take  by mouth daily.       insulin glulisine U-100 (Apidra SoloStar U-100 Insulin) 100 unit/mL pen Inject 10 units + sliding scale according to pre-meal glucose before meals tid  Qty: 45 mL, Refills: 3    Associated Diagnoses: Uncontrolled type 2 diabetes mellitus with hyperglycemia, with long-term current use of insulin ([facility])      levothyroxine (SYNTHROID) 112 mcg tablet Take 0.5 Tabs by mouth Daily (before breakfast). Qty: 45 Tab, Refills: 1    Associated Diagnoses: Acquired hypothyroidism      metoprolol succinate (TOPROL-XL) 50 mg XL tablet Take 1 Tab by mouth daily. Qty: 90 Tab, Refills: 3    Associated Diagnoses: Essential hypertension      zinc 50 mg tab tablet Take  by mouth daily. Abhishek Brand FlexTouch U-200 200 unit/mL (3 mL) inpn INJECT 68 UNITS SUBCUTANEOUSLY ONCE DAILY  Qty: 27 mL, Refills: 5    Associated Diagnoses: Type 2 diabetes mellitus with hyperglycemia, with long-term current use of insulin ([facility])      rosuvastatin (CRESTOR) 20 mg tablet Take 1 Tab by mouth nightly. Qty: 90 Tab, Refills: 1    Associated Diagnoses: Mixed hyperlipidemia      omeprazole (PRILOSEC) 20 mg capsule Take 1 Cap by mouth daily. Qty: 90 Cap, Refills: 3    Associated Diagnoses: Gastroesophageal reflux disease without esophagitis      fenofibrate micronized (LOFIBRA) 134 mg capsule Take 1 Cap by mouth nightly. Qty: 90 Cap, Refills: 1    Associated Diagnoses: Mixed hyperlipidemia      SITagliptin-metFORMIN (JANUMET XR)  mg TM24 Take 1 Tab by mouth daily (with breakfast). Qty: 90 Tab, Refills: 3    Associated Diagnoses: Uncontrolled type 2 diabetes mellitus without complication, with long-term current use of insulin      Insulin Needles, Disposable, 32 gauge x 5/32\" ndle Use to inject insulin 4 times/day. E11.65  Qty: 400 Pen Needle, Refills: 3    Associated Diagnoses: Uncontrolled type 2 diabetes mellitus without complication, with long-term current use of insulin      glucose blood VI test strips (ACCU-CHEK ASHLEY PLUS TEST STRP) strip Use to check glucose tid.   Dx: E11.65, I10  Qty: 300 Strip, Refills: 3    Associated Diagnoses: Uncontrolled type 2 diabetes mellitus without complication, with long-term current use of insulin; Essential hypertension      lancets (ACCU-CHEK SOFTCLIX LANCETS) misc Use to check glucose tid. E11.65, I10  Qty: 300 Each, Refills: 3    Associated Diagnoses: Uncontrolled type 2 diabetes mellitus without complication, with long-term current use of insulin      Lancing Device with Lancets (ACCU-CHEK SOFT DEV LANCETS) kit 200 Each by Does Not Apply route two (2) times a day. DX: E11.9  Qty: 1 Kit, Refills: 0      Blood-Glucose Meter (ACCU-CHEK ASHLEY PLUS METER) misc Use twice daily to check blood sugar  Qty: 1 Each, Refills: 0      Blood Glucose Control High&Low (ACCU-CHEK ASHLEY CONTROL SOLN) soln Use as directed  Qty: 1 Bottle, Refills: 6      aspirin delayed-release 81 mg tablet Take  by mouth daily.          STOP taking these medications       SITagliptin (Januvia) 100 mg tablet Comments:   Reason for Stopping:         varicella-zoster recombinant, PF, (SHINGRIX, PF,) 50 mcg/0.5 mL susr injection Comments:   Reason for Stopping:             Cesar Welsh NP  01/13/21  7:35 AM

## 2021-01-13 NOTE — ROUTINE PROCESS
Cardiac Rehab: Spoke with patient regarding referral to cardiac rehab. Patient meets admission criteria based on PCI (01/12/21). Written information about Cardiac Rehab given and reviewed with patient. Discussed lifestyle modifications to promote cardiac wellness. Patient indicated that he wants to discuss the cardiac rehab program with Dr. Drew Dallas and he will call or come by to schedule orientation if he decides to participate. His Cardiologist is Dr. Stella Gonzales.       Thank you,  Santino Agee, SEANN, RN  Cardiopulmonary Rehabilitation Nurse Liaison  [facility]

## 2021-01-13 NOTE — MED STUDENT NOTES
am  1/13/2021 7:45 AM    Admit Date: 1/12/2021    Admit Diagnosis: SOB (shortness of breath) [R06.02]  CAD (coronary artery disease) [I25.10]      Subjective:    Patient slept well overnight. No acute changes overnight. Reports no chest pain, SOB, or dizziness this morning. The patient was able to ambulate with no difficulties overnight.      Objective:      Visit Vitals  /63 (BP 1 Location: Left arm, BP Patient Position: At rest;Lying right side)   Pulse 63   Temp 98 °F (36.7 °C)   Resp 20   Ht 5' 6\" (1.676 m)   Wt 91.3 kg (201 lb 3.2 oz)   SpO2 94%   BMI 32.47 kg/m²       ROS:  General ROS: negative for - chills  Hematological and Lymphatic ROS: negative for - blood clots or jaundice  Respiratory ROS: no cough, shortness of breath, or wheezing  Cardiovascular ROS: no chest pain or dyspnea on exertion  Gastrointestinal ROS: no abdominal pain, change in bowel habits, or black or bloody stools  Neurological ROS: no TIA or stroke symptoms    Physical Exam:    Physical Examination: General appearance - alert, well appearing, and in no distress  Mental status - alert, oriented to person, place, and time  Eyes - pupils equal and reactive, extraocular eye movements intact  Neck/lymph - supple, no significant adenopathy  Chest/CV - clear to auscultation, no wheezes, rales or rhonchi, symmetric air entry  Heart - normal rate, regular rhythm, normal S1, S2, no murmurs, rubs, clicks or gallops  Abdomen/GI - soft, nontender, nondistended, no masses or organomegaly  Musculoskeletal - no joint tenderness, deformity or swelling  Extremities - peripheral pulses normal, no pedal edema, no clubbing or cyanosis  Skin - normal coloration and turgor, no rashes, no suspicious skin lesions noted    Current Facility-Administered Medications   Medication Dose Route Frequency    aspirin chewable tablet 81 mg  81 mg Oral DAILY    0.9% sodium chloride infusion  75 mL/hr IntraVENous CONTINUOUS    ticagrelor (BRILINTA) tablet 90 mg  90 mg Oral Q12H    metoprolol succinate (TOPROL-XL) XL tablet 50 mg  50 mg Oral DAILY    rosuvastatin (CRESTOR) tablet 20 mg  20 mg Oral QHS    valsartan (DIOVAN) tablet 320 mg  320 mg Oral DAILY    hydrALAZINE (APRESOLINE) 20 mg/mL injection 10-20 mg  10-20 mg IntraVENous PRN    dapagliflozin (FARXIGA) tablet 5 mg (Patient Supplied)  5 mg Oral DAILY    alogliptin (NESINA) tablet 12.5 mg  12.5 mg Oral DAILY    insulin lispro (HUMALOG) injection 10 Units  10 Units SubCUTAneous TIDAC    insulin glargine (LANTUS) injection 68 Units  68 Units SubCUTAneous DAILY    fenofibrate (LOFIBRA) tablet 160 mg  160 mg Oral DAILY    pantoprazole (PROTONIX) tablet 40 mg  40 mg Oral ACB    levothyroxine (SYNTHROID) tablet 56 mcg  56 mcg Oral ACB    0.9% sodium chloride infusion  75 mL/hr IntraVENous CONTINUOUS    sodium chloride (NS) flush 5-40 mL  5-40 mL IntraVENous Q8H    sodium chloride (NS) flush 5-40 mL  5-40 mL IntraVENous PRN    acetaminophen (TYLENOL) tablet 650 mg  650 mg Oral Q4H PRN       Data Review:   @LABRCNT(Na,K,BUN,CREA,WBC,HGB,HCT,PLT,INR,TRP,TCHOL*,Triglyceride*,LDL*,LDLCPOC HDL*,HDL])@    TELEMETRY: 66 SR    Assessment/Plan:     Active Problems:    CAD (coronary artery disease) (1/12/2021)- Left and right heart cath with PCI yesterday 1-12-21. CAD with LAD, PTCA of a diagonal, and OM stenting. Minimally elevated pulmonary artery pressures with normal cardiac output and had a successful Angio-Seal. Continue DAPT. Right groin site c/d/i with good pulse.    -Patient to be discharged today.            Deni Bridges, NP student

## 2021-01-13 NOTE — ROUTINE PROCESS
TRANSFER - IN REPORT:    Verbal report received from [address] [place] [address] Group being received from Cath lab  for routine progression of care. Report consisted of patients Situation, Background, Assessment and Recommendations(SBAR). Information from the following report(s) SBAR, Kardex, [facility] and Recent Results was reviewed. Opportunity for questions and clarification was provided. Assessment completed upon patients arrival to unit and care assumed. Patient received to room 320. Patient connected to monitor and assessment completed. Plan of care reviewed. Patient oriented to room and call light. Patient aware to use call light to communicate any chest pain or needs. Admission skin assessment completed with second RN and reveals the following: sacrum is intact and blanchable. Heels are intact. Right groin site is c/d/i. Tegaderm and gauze in place. Overall skin is war and dry.

## 2021-01-28 ENCOUNTER — HOSPITAL ENCOUNTER (OUTPATIENT)
Dept: CARDIAC REHAB | Age: 71
Discharge: HOME | End: 2021-01-28

## 2021-01-28 NOTE — CARDIO/PULMONARY
Dear Dr. Mame Cruz: Thank you for referring your patient, Emy Cardozo (: 1950), to the Cardiopulmonary Rehabilitation Program at [facility].  Mr. Randy Benjamin is a good candidate for the Cardiac Rehab Program and should see improvements with regular participation. We will be addressing appropriate interventions for modifiable risk factors with your patient during the next 12 weeks. We will contact you with any issues or concerns that may arise, or you can follow your patients progress through [facility] at any time. A final summary will be available on [facility] when the program is completed. Again, thank you for your referral. If we can be of further assistance, please feel free to contact the Cardiopulmonary Rehab staff at [phone]. Sincerely, JALEESA Patino, RN Cardiopulmonary Rehabilitation Nurse [facility]

## 2021-02-04 ENCOUNTER — HOSPITAL ENCOUNTER (OUTPATIENT)
Dept: CARDIAC REHAB | Age: 71
Discharge: HOME | End: 2021-02-04
Payer: MEDICARE

## 2021-02-04 VITALS — HEIGHT: 67 IN | BODY MASS INDEX: 31.23 KG/M2 | WEIGHT: 199 LBS

## 2021-02-04 PROCEDURE — 93798 PHYS/QHP OP CAR RHAB W/ECG: CPT

## 2021-02-08 ENCOUNTER — HOSPITAL ENCOUNTER (OUTPATIENT)
Dept: CARDIAC REHAB | Age: 71
Discharge: HOME | End: 2021-02-08
Payer: MEDICARE

## 2021-02-08 PROCEDURE — 93798 PHYS/QHP OP CAR RHAB W/ECG: CPT

## 2021-02-11 ENCOUNTER — APPOINTMENT (OUTPATIENT)
Dept: CARDIAC REHAB | Age: 71
End: 2021-02-11
Payer: MEDICARE

## 2021-02-15 ENCOUNTER — HOSPITAL ENCOUNTER (OUTPATIENT)
Dept: CARDIAC REHAB | Age: 71
Discharge: HOME | End: 2021-02-15
Payer: MEDICARE

## 2021-02-15 PROCEDURE — 93798 PHYS/QHP OP CAR RHAB W/ECG: CPT

## 2021-02-16 ENCOUNTER — HOSPITAL ENCOUNTER (OUTPATIENT)
Dept: LAB | Age: 71
Discharge: HOME | End: 2021-02-16
Attending: PHYSICIAN ASSISTANT
Payer: MEDICARE

## 2021-02-16 PROBLEM — I25.118 CORONARY ARTERY DISEASE OF NATIVE ARTERY OF NATIVE HEART WITH STABLE ANGINA PECTORIS (HCC): Status: ACTIVE | Noted: 2021-01-12

## 2021-02-16 PROBLEM — Z95.820 S/P ANGIOPLASTY WITH STENT: Status: ACTIVE | Noted: 2021-02-16

## 2021-02-16 LAB
ALBUMIN SERPL-MCNC: 3.5 G/DL (ref 3.2–4.6)
ALBUMIN/GLOB SERPL: 1 {RATIO} (ref 1.2–3.5)
ALP SERPL-CCNC: 47 U/L (ref 50–136)
ALT SERPL-CCNC: 115 U/L (ref 12–65)
ANION GAP SERPL CALC-SCNC: 5 MMOL/L (ref 7–16)
AST SERPL-CCNC: 66 U/L (ref 15–37)
BASOPHILS # BLD: 0.1 K/UL (ref 0–0.2)
BASOPHILS NFR BLD: 1 % (ref 0–2)
BILIRUB SERPL-MCNC: 0.4 MG/DL (ref 0.2–1.1)
BUN SERPL-MCNC: 20 MG/DL (ref 8–23)
CALCIUM SERPL-MCNC: 9.2 MG/DL (ref 8.3–10.4)
CHLORIDE SERPL-SCNC: 104 MMOL/L (ref 98–107)
CHOLEST SERPL-MCNC: 138 MG/DL
CO2 SERPL-SCNC: 29 MMOL/L (ref 21–32)
CREAT SERPL-MCNC: 1.17 MG/DL (ref 0.8–1.5)
D DIMER PPP FEU-MCNC: 0.33 UG/ML(FEU)
DIFFERENTIAL METHOD BLD: ABNORMAL
EOSINOPHIL # BLD: 0.1 K/UL (ref 0–0.8)
EOSINOPHIL NFR BLD: 2 % (ref 0.5–7.8)
ERYTHROCYTE [DISTWIDTH] IN BLOOD BY AUTOMATED COUNT: 12.4 % (ref 11.9–14.6)
EST. AVERAGE GLUCOSE BLD GHB EST-MCNC: 189 MG/DL
GLOBULIN SER CALC-MCNC: 3.6 G/DL (ref 2.3–3.5)
GLUCOSE SERPL-MCNC: 189 MG/DL (ref 65–100)
HBA1C MFR BLD: 8.2 % (ref 4.2–6.3)
HCT VFR BLD AUTO: 40.8 % (ref 41.1–50.3)
HDLC SERPL-MCNC: 33 MG/DL (ref 40–60)
HDLC SERPL: 4.2 {RATIO}
HGB BLD-MCNC: 13.5 G/DL (ref 13.6–17.2)
IMM GRANULOCYTES # BLD AUTO: 0.1 K/UL (ref 0–0.5)
IMM GRANULOCYTES NFR BLD AUTO: 2 % (ref 0–5)
LDLC SERPL CALC-MCNC: 61 MG/DL
LIPID PROFILE,FLP: ABNORMAL
LYMPHOCYTES # BLD: 2.2 K/UL (ref 0.5–4.6)
LYMPHOCYTES NFR BLD: 37 % (ref 13–44)
MCH RBC QN AUTO: 28.8 PG (ref 26.1–32.9)
MCHC RBC AUTO-ENTMCNC: 33.1 G/DL (ref 31.4–35)
MCV RBC AUTO: 87.2 FL (ref 79.6–97.8)
MONOCYTES # BLD: 0.4 K/UL (ref 0.1–1.3)
MONOCYTES NFR BLD: 7 % (ref 4–12)
NEUTS SEG # BLD: 3.1 K/UL (ref 1.7–8.2)
NEUTS SEG NFR BLD: 51 % (ref 43–78)
NRBC # BLD: 0 K/UL (ref 0–0.2)
PLATELET # BLD AUTO: 228 K/UL (ref 150–450)
PMV BLD AUTO: 10.7 FL (ref 9.4–12.3)
POTASSIUM SERPL-SCNC: 4.5 MMOL/L (ref 3.5–5.1)
PROT SERPL-MCNC: 7.1 G/DL (ref 6.3–8.2)
RBC # BLD AUTO: 4.68 M/UL (ref 4.23–5.6)
SODIUM SERPL-SCNC: 138 MMOL/L (ref 136–145)
TRIGL SERPL-MCNC: 220 MG/DL (ref 35–150)
TSH SERPL DL<=0.005 MIU/L-ACNC: 1.22 UIU/ML
VLDLC SERPL CALC-MCNC: 44 MG/DL (ref 6–23)
WBC # BLD AUTO: 6 K/UL (ref 4.3–11.1)

## 2021-02-16 PROCEDURE — 80061 LIPID PANEL: CPT

## 2021-02-16 PROCEDURE — 80053 COMPREHEN METABOLIC PANEL: CPT

## 2021-02-16 PROCEDURE — 36415 COLL VENOUS BLD VENIPUNCTURE: CPT

## 2021-02-16 PROCEDURE — 85025 COMPLETE CBC W/AUTO DIFF WBC: CPT

## 2021-02-16 PROCEDURE — 83036 HEMOGLOBIN GLYCOSYLATED A1C: CPT

## 2021-02-16 PROCEDURE — 84443 ASSAY THYROID STIM HORMONE: CPT

## 2021-02-16 PROCEDURE — 85379 FIBRIN DEGRADATION QUANT: CPT

## 2021-02-18 ENCOUNTER — HOSPITAL ENCOUNTER (OUTPATIENT)
Dept: CARDIAC REHAB | Age: 71
Discharge: HOME | End: 2021-02-18
Payer: MEDICARE

## 2021-02-18 ENCOUNTER — APPOINTMENT (OUTPATIENT)
Dept: CARDIAC REHAB | Age: 71
End: 2021-02-18
Payer: MEDICARE

## 2021-02-18 PROCEDURE — 93798 PHYS/QHP OP CAR RHAB W/ECG: CPT

## 2021-02-22 ENCOUNTER — HOSPITAL ENCOUNTER (OUTPATIENT)
Dept: CARDIAC REHAB | Age: 71
Discharge: HOME | End: 2021-02-22
Payer: MEDICARE

## 2021-02-22 PROCEDURE — 93798 PHYS/QHP OP CAR RHAB W/ECG: CPT

## 2021-02-25 ENCOUNTER — HOSPITAL ENCOUNTER (OUTPATIENT)
Dept: CARDIAC REHAB | Age: 71
Discharge: HOME | End: 2021-02-25
Payer: MEDICARE

## 2021-02-25 PROCEDURE — 93798 PHYS/QHP OP CAR RHAB W/ECG: CPT

## 2021-03-01 ENCOUNTER — HOSPITAL ENCOUNTER (OUTPATIENT)
Dept: CARDIAC REHAB | Age: 71
Discharge: HOME | End: 2021-03-01
Payer: MEDICARE

## 2021-03-01 PROCEDURE — 93798 PHYS/QHP OP CAR RHAB W/ECG: CPT

## 2021-03-04 ENCOUNTER — HOSPITAL ENCOUNTER (OUTPATIENT)
Dept: CARDIAC REHAB | Age: 71
Discharge: HOME | End: 2021-03-04
Payer: MEDICARE

## 2021-03-04 VITALS — WEIGHT: 199 LBS | BODY MASS INDEX: 31.64 KG/M2

## 2021-03-04 PROCEDURE — 93798 PHYS/QHP OP CAR RHAB W/ECG: CPT

## 2021-03-08 ENCOUNTER — HOSPITAL ENCOUNTER (OUTPATIENT)
Dept: CARDIAC REHAB | Age: 71
Discharge: HOME | End: 2021-03-08
Payer: MEDICARE

## 2021-03-08 PROCEDURE — 93798 PHYS/QHP OP CAR RHAB W/ECG: CPT

## 2021-03-09 ENCOUNTER — HOSPITAL ENCOUNTER (OUTPATIENT)
Dept: GENERAL RADIOLOGY | Age: 71
Discharge: HOME | End: 2021-03-09
Attending: PHYSICIAN ASSISTANT
Payer: MEDICARE

## 2021-03-09 DIAGNOSIS — R06.00 DYSPNEA, UNSPECIFIED TYPE: ICD-10-CM

## 2021-03-09 DIAGNOSIS — R68.81 EARLY SATIETY: ICD-10-CM

## 2021-03-09 DIAGNOSIS — R14.0 ABDOMINAL BLOATING: ICD-10-CM

## 2021-03-09 PROCEDURE — 74011000250 HC RX REV CODE- 250: Performed by: PHYSICIAN ASSISTANT

## 2021-03-09 PROCEDURE — 74246 X-RAY XM UPR GI TRC 2CNTRST: CPT

## 2021-03-09 RX ADMIN — ANTACID/ANTIFLATULENT 4 G: 380; 550; 10; 10 GRANULE, EFFERVESCENT ORAL at 08:29

## 2021-03-09 RX ADMIN — BARIUM SULFATE 355 ML: 0.6 SUSPENSION ORAL at 08:34

## 2021-03-09 RX ADMIN — BARIUM SULFATE 700 MG: 700 TABLET ORAL at 08:35

## 2021-03-09 RX ADMIN — BARIUM SULFATE 135 ML: 980 POWDER, FOR SUSPENSION ORAL at 08:28

## 2021-03-09 NOTE — PROGRESS NOTES
Study showed a small hiatal hernia with a large amount of reflux up into his neck region. He also has delayed emptying of his stomach which is probably why he feels full quickly and so easily feels bloated. I anticipate that his shortness of breath with certain positions may be a result of his abdominal bloating and the intraabdominal pressure that this puts on his lungs, but think it's best for us to rule out any other underlying pulmonary sources by following through with the pulmonary referral that we made. He should try increasing Omeprazole to 40 mg (2 x 20 mg capsules) daily - will send in a new dosage that he can fill when he uses up the supply that he has by doubling. Remind him that this med needs to be taken on an empty stomach 30 minutes before eating a meal - cannot be taken the same time of day as thyroid unless  by 4 hours so may have to take 30 minutes prior to dinner or lunch to provide enough separation between those 2 finicky meds.

## 2021-03-10 NOTE — PROGRESS NOTES
Pt notified that his study showed a small hiatal hernia with a large amount of reflux up into his neck region. He also has delayed emptying of his stomach which is probably why he feels full quickly and so easily feels bloated. I anticipate that his shortness of breath with certain positions may be a result of his abdominal bloating and the intraabdominal pressure that this puts on his lungs, but think it's best for us to rule out any other underlying pulmonary sources by following through with the pulmonary referral that we made. He should try increasing Omeprazole to 40 mg (2 x 20 mg capsules) daily - will send in a new dosage that he can fill when he uses up the supply that he has by doubling. Remind him that this med needs to be taken on an empty stomach 30 minutes before eating a meal - cannot be taken the same time of day as thyroid unless  by 4 hours so may have to take 30 minutes prior to dinner or lunch to provide enough separation between those 2 finicky meds. Pt verbalized understanding.

## 2021-03-11 ENCOUNTER — APPOINTMENT (OUTPATIENT)
Dept: CARDIAC REHAB | Age: 71
End: 2021-03-11
Payer: MEDICARE

## 2021-03-15 ENCOUNTER — HOSPITAL ENCOUNTER (OUTPATIENT)
Dept: CARDIAC REHAB | Age: 71
Discharge: HOME | End: 2021-03-15
Payer: MEDICARE

## 2021-03-15 PROCEDURE — 93798 PHYS/QHP OP CAR RHAB W/ECG: CPT

## 2021-03-18 ENCOUNTER — APPOINTMENT (OUTPATIENT)
Dept: CARDIAC REHAB | Age: 71
End: 2021-03-18
Payer: MEDICARE

## 2021-03-25 ENCOUNTER — APPOINTMENT (OUTPATIENT)
Dept: CARDIAC REHAB | Age: 71
End: 2021-03-25
Payer: MEDICARE

## 2021-03-29 ENCOUNTER — HOSPITAL ENCOUNTER (OUTPATIENT)
Dept: CARDIAC REHAB | Age: 71
End: 2021-03-29
Payer: MEDICARE

## 2021-04-02 ENCOUNTER — HOSPITAL ENCOUNTER (OUTPATIENT)
Dept: GENERAL RADIOLOGY | Age: 71
Discharge: HOME | End: 2021-04-02
Attending: NURSE PRACTITIONER
Payer: MEDICARE

## 2021-04-02 DIAGNOSIS — U07.1 ACUTE BRONCHITIS DUE TO COVID-19 VIRUS: ICD-10-CM

## 2021-04-02 DIAGNOSIS — J20.8 ACUTE BRONCHITIS DUE TO COVID-19 VIRUS: ICD-10-CM

## 2021-04-02 PROCEDURE — 71046 X-RAY EXAM CHEST 2 VIEWS: CPT

## 2021-05-12 ENCOUNTER — HOSPITAL ENCOUNTER (OUTPATIENT)
Dept: LAB | Age: 71
Discharge: HOME | End: 2021-05-12
Attending: PHYSICIAN ASSISTANT
Payer: MEDICARE

## 2021-05-12 DIAGNOSIS — E78.2 MIXED HYPERLIPIDEMIA: ICD-10-CM

## 2021-05-12 DIAGNOSIS — I25.10 CORONARY ARTERY DISEASE INVOLVING NATIVE CORONARY ARTERY OF NATIVE HEART WITHOUT ANGINA PECTORIS: ICD-10-CM

## 2021-05-12 DIAGNOSIS — E03.9 ACQUIRED HYPOTHYROIDISM: ICD-10-CM

## 2021-05-12 DIAGNOSIS — I10 ESSENTIAL HYPERTENSION: ICD-10-CM

## 2021-05-12 DIAGNOSIS — R71.0 DECREASED HEMOGLOBIN: ICD-10-CM

## 2021-05-12 LAB
ALBUMIN SERPL-MCNC: 3.8 G/DL (ref 3.2–4.6)
ALBUMIN/GLOB SERPL: 1 {RATIO} (ref 1.2–3.5)
ALP SERPL-CCNC: 40 U/L (ref 50–136)
ALT SERPL-CCNC: 56 U/L (ref 12–65)
ANION GAP SERPL CALC-SCNC: 6 MMOL/L (ref 7–16)
AST SERPL-CCNC: 47 U/L (ref 15–37)
BASOPHILS # BLD: 0 K/UL (ref 0–0.2)
BASOPHILS NFR BLD: 1 % (ref 0–2)
BILIRUB SERPL-MCNC: 0.4 MG/DL (ref 0.2–1.1)
BUN SERPL-MCNC: 25 MG/DL (ref 8–23)
CALCIUM SERPL-MCNC: 9.4 MG/DL (ref 8.3–10.4)
CHLORIDE SERPL-SCNC: 108 MMOL/L (ref 98–107)
CHOLEST SERPL-MCNC: 182 MG/DL
CO2 SERPL-SCNC: 26 MMOL/L (ref 21–32)
CREAT SERPL-MCNC: 1.11 MG/DL (ref 0.8–1.5)
CREAT UR-MCNC: 96.4 MG/DL
DIFFERENTIAL METHOD BLD: NORMAL
EOSINOPHIL # BLD: 0.3 K/UL (ref 0–0.8)
EOSINOPHIL NFR BLD: 5 % (ref 0.5–7.8)
ERYTHROCYTE [DISTWIDTH] IN BLOOD BY AUTOMATED COUNT: 13.6 % (ref 11.9–14.6)
EST. AVERAGE GLUCOSE BLD GHB EST-MCNC: 194 MG/DL
GLOBULIN SER CALC-MCNC: 3.7 G/DL (ref 2.3–3.5)
GLUCOSE SERPL-MCNC: 110 MG/DL (ref 65–100)
HBA1C MFR BLD: 8.4 % (ref 4.2–6.3)
HCT VFR BLD AUTO: 43.1 % (ref 41.1–50.3)
HDLC SERPL-MCNC: 55 MG/DL (ref 40–60)
HDLC SERPL: 3.3 {RATIO}
HGB BLD-MCNC: 13.8 G/DL (ref 13.6–17.2)
IMM GRANULOCYTES # BLD AUTO: 0 K/UL (ref 0–0.5)
IMM GRANULOCYTES NFR BLD AUTO: 0 % (ref 0–5)
LDLC SERPL CALC-MCNC: 107 MG/DL
LIPID PROFILE,FLP: ABNORMAL
LYMPHOCYTES # BLD: 2.1 K/UL (ref 0.5–4.6)
LYMPHOCYTES NFR BLD: 40 % (ref 13–44)
MCH RBC QN AUTO: 28.9 PG (ref 26.1–32.9)
MCHC RBC AUTO-ENTMCNC: 32 G/DL (ref 31.4–35)
MCV RBC AUTO: 90.2 FL (ref 79.6–97.8)
MICROALBUMIN UR-MCNC: 0.92 MG/DL
MICROALBUMIN/CREAT UR-RTO: 10 MG/G
MONOCYTES # BLD: 0.5 K/UL (ref 0.1–1.3)
MONOCYTES NFR BLD: 10 % (ref 4–12)
NEUTS SEG # BLD: 2.3 K/UL (ref 1.7–8.2)
NEUTS SEG NFR BLD: 45 % (ref 43–78)
NRBC # BLD: 0 K/UL (ref 0–0.2)
PLATELET # BLD AUTO: 214 K/UL (ref 150–450)
PMV BLD AUTO: 10.8 FL (ref 9.4–12.3)
POTASSIUM SERPL-SCNC: 4.8 MMOL/L (ref 3.5–5.1)
PROT SERPL-MCNC: 7.5 G/DL (ref 6.3–8.2)
RBC # BLD AUTO: 4.78 M/UL (ref 4.23–5.6)
SODIUM SERPL-SCNC: 140 MMOL/L (ref 136–145)
TRIGL SERPL-MCNC: 100 MG/DL (ref 35–150)
TSH SERPL DL<=0.005 MIU/L-ACNC: 1.66 UIU/ML
VLDLC SERPL CALC-MCNC: 20 MG/DL (ref 6–23)
WBC # BLD AUTO: 5.2 K/UL (ref 4.3–11.1)

## 2021-05-12 PROCEDURE — 83036 HEMOGLOBIN GLYCOSYLATED A1C: CPT

## 2021-05-12 PROCEDURE — 80053 COMPREHEN METABOLIC PANEL: CPT

## 2021-05-12 PROCEDURE — 82043 UR ALBUMIN QUANTITATIVE: CPT

## 2021-05-12 PROCEDURE — 84443 ASSAY THYROID STIM HORMONE: CPT

## 2021-05-12 PROCEDURE — 36415 COLL VENOUS BLD VENIPUNCTURE: CPT

## 2021-05-12 PROCEDURE — 80061 LIPID PANEL: CPT

## 2021-05-12 PROCEDURE — 85025 COMPLETE CBC W/AUTO DIFF WBC: CPT

## 2021-05-25 PROBLEM — I35.8 AORTIC VALVE SCLEROSIS: Status: ACTIVE | Noted: 2021-05-25

## 2021-07-06 ENCOUNTER — HOSPITAL ENCOUNTER (OUTPATIENT)
Dept: GENERAL RADIOLOGY | Age: 71
Discharge: HOME | End: 2021-07-06
Payer: MEDICARE

## 2021-07-06 DIAGNOSIS — R06.00 DYSPNEA, UNSPECIFIED TYPE: ICD-10-CM

## 2021-07-06 DIAGNOSIS — Z86.16 HISTORY OF COVID-19: ICD-10-CM

## 2021-07-06 PROBLEM — R09.02 HYPOXEMIA: Status: RESOLVED | Noted: 2017-10-27 | Resolved: 2021-07-06

## 2021-07-06 PROCEDURE — 71046 X-RAY EXAM CHEST 2 VIEWS: CPT

## 2021-08-04 ENCOUNTER — HOSPITAL ENCOUNTER (OUTPATIENT)
Dept: CT IMAGING | Age: 71
Discharge: HOME | End: 2021-08-04
Attending: INTERNAL MEDICINE
Payer: MEDICARE

## 2021-08-04 DIAGNOSIS — R06.09 DOE (DYSPNEA ON EXERTION): ICD-10-CM

## 2021-08-04 PROCEDURE — 71250 CT THORAX DX C-: CPT

## 2021-08-05 NOTE — PROGRESS NOTES
The CT is normal. He seemed to do well after his stent.  I think he can follow up with us as needed, but happy to see him if desired for follow up

## 2021-09-15 ENCOUNTER — HOSPITAL ENCOUNTER (OUTPATIENT)
Dept: LAB | Age: 71
Discharge: HOME | End: 2021-09-15
Attending: PHYSICIAN ASSISTANT
Payer: MEDICARE

## 2021-09-15 DIAGNOSIS — E03.9 ACQUIRED HYPOTHYROIDISM: ICD-10-CM

## 2021-09-15 DIAGNOSIS — I10 ESSENTIAL HYPERTENSION: ICD-10-CM

## 2021-09-15 DIAGNOSIS — I25.10 CORONARY ARTERY DISEASE INVOLVING NATIVE CORONARY ARTERY OF NATIVE HEART WITHOUT ANGINA PECTORIS: ICD-10-CM

## 2021-09-15 DIAGNOSIS — E78.2 MIXED HYPERLIPIDEMIA: ICD-10-CM

## 2021-09-15 LAB
ALBUMIN SERPL-MCNC: 3.4 G/DL (ref 3.2–4.6)
ALBUMIN/GLOB SERPL: 0.8 {RATIO} (ref 1.2–3.5)
ALP SERPL-CCNC: 44 U/L (ref 50–136)
ALT SERPL-CCNC: 50 U/L (ref 12–65)
ANION GAP SERPL CALC-SCNC: 5 MMOL/L (ref 7–16)
AST SERPL-CCNC: 34 U/L (ref 15–37)
BASOPHILS # BLD: 0 K/UL (ref 0–0.2)
BASOPHILS NFR BLD: 1 % (ref 0–2)
BILIRUB SERPL-MCNC: 0.4 MG/DL (ref 0.2–1.1)
BUN SERPL-MCNC: 27 MG/DL (ref 8–23)
CALCIUM SERPL-MCNC: 9 MG/DL (ref 8.3–10.4)
CHLORIDE SERPL-SCNC: 106 MMOL/L (ref 98–107)
CHOLEST SERPL-MCNC: 155 MG/DL
CO2 SERPL-SCNC: 29 MMOL/L (ref 21–32)
CREAT SERPL-MCNC: 1.16 MG/DL (ref 0.8–1.5)
DIFFERENTIAL METHOD BLD: NORMAL
EOSINOPHIL # BLD: 0.1 K/UL (ref 0–0.8)
EOSINOPHIL NFR BLD: 2 % (ref 0.5–7.8)
ERYTHROCYTE [DISTWIDTH] IN BLOOD BY AUTOMATED COUNT: 12.5 % (ref 11.9–14.6)
EST. AVERAGE GLUCOSE BLD GHB EST-MCNC: 212 MG/DL
GLOBULIN SER CALC-MCNC: 4.3 G/DL (ref 2.3–3.5)
GLUCOSE SERPL-MCNC: 144 MG/DL (ref 65–100)
HBA1C MFR BLD: 9 % (ref 4.2–6.3)
HCT VFR BLD AUTO: 43.6 % (ref 41.1–50.3)
HDLC SERPL-MCNC: 36 MG/DL (ref 40–60)
HDLC SERPL: 4.3 {RATIO}
HGB BLD-MCNC: 14.1 G/DL (ref 13.6–17.2)
IMM GRANULOCYTES # BLD AUTO: 0 K/UL (ref 0–0.5)
IMM GRANULOCYTES NFR BLD AUTO: 1 % (ref 0–5)
LDLC SERPL CALC-MCNC: 64.4 MG/DL
LYMPHOCYTES # BLD: 1.8 K/UL (ref 0.5–4.6)
LYMPHOCYTES NFR BLD: 33 % (ref 13–44)
MCH RBC QN AUTO: 28.9 PG (ref 26.1–32.9)
MCHC RBC AUTO-ENTMCNC: 32.3 G/DL (ref 31.4–35)
MCV RBC AUTO: 89.3 FL (ref 79.6–97.8)
MONOCYTES # BLD: 0.4 K/UL (ref 0.1–1.3)
MONOCYTES NFR BLD: 7 % (ref 4–12)
NEUTS SEG # BLD: 3 K/UL (ref 1.7–8.2)
NEUTS SEG NFR BLD: 57 % (ref 43–78)
NRBC # BLD: 0 K/UL (ref 0–0.2)
PLATELET # BLD AUTO: 195 K/UL (ref 150–450)
PMV BLD AUTO: 10.6 FL (ref 9.4–12.3)
POTASSIUM SERPL-SCNC: 4.4 MMOL/L (ref 3.5–5.1)
PROT SERPL-MCNC: 7.7 G/DL (ref 6.3–8.2)
RBC # BLD AUTO: 4.88 M/UL (ref 4.23–5.6)
SODIUM SERPL-SCNC: 140 MMOL/L (ref 136–145)
TRIGL SERPL-MCNC: 273 MG/DL (ref 35–150)
TSH SERPL DL<=0.005 MIU/L-ACNC: 1.19 UIU/ML
VLDLC SERPL CALC-MCNC: 54.6 MG/DL (ref 6–23)
WBC # BLD AUTO: 5.4 K/UL (ref 4.3–11.1)

## 2021-09-15 PROCEDURE — 80053 COMPREHEN METABOLIC PANEL: CPT

## 2021-09-15 PROCEDURE — 85025 COMPLETE CBC W/AUTO DIFF WBC: CPT

## 2021-09-15 PROCEDURE — 80061 LIPID PANEL: CPT

## 2021-09-15 PROCEDURE — 36415 COLL VENOUS BLD VENIPUNCTURE: CPT

## 2021-09-15 PROCEDURE — 83036 HEMOGLOBIN GLYCOSYLATED A1C: CPT

## 2021-09-15 PROCEDURE — 84443 ASSAY THYROID STIM HORMONE: CPT

## 2022-01-04 ENCOUNTER — HOSPITAL ENCOUNTER (OUTPATIENT)
Dept: LAB | Age: 72
Discharge: HOME | End: 2022-01-04
Payer: MEDICARE

## 2022-01-04 DIAGNOSIS — E78.2 MIXED HYPERLIPIDEMIA: ICD-10-CM

## 2022-01-04 DIAGNOSIS — E03.9 ACQUIRED HYPOTHYROIDISM: ICD-10-CM

## 2022-01-04 DIAGNOSIS — Z12.5 SCREENING FOR PROSTATE CANCER: ICD-10-CM

## 2022-01-04 LAB
ALBUMIN SERPL-MCNC: 3.4 G/DL (ref 3.2–4.6)
ALBUMIN/GLOB SERPL: 0.8 {RATIO} (ref 1.2–3.5)
ALP SERPL-CCNC: 42 U/L (ref 50–136)
ALT SERPL-CCNC: 56 U/L (ref 12–65)
ANION GAP SERPL CALC-SCNC: 4 MMOL/L (ref 7–16)
AST SERPL-CCNC: 32 U/L (ref 15–37)
BILIRUB SERPL-MCNC: 0.3 MG/DL (ref 0.2–1.1)
BUN SERPL-MCNC: 28 MG/DL (ref 8–23)
CALCIUM SERPL-MCNC: 9.6 MG/DL (ref 8.3–10.4)
CHLORIDE SERPL-SCNC: 107 MMOL/L (ref 98–107)
CHOLEST SERPL-MCNC: 153 MG/DL
CO2 SERPL-SCNC: 28 MMOL/L (ref 21–32)
CREAT SERPL-MCNC: 1.09 MG/DL (ref 0.8–1.5)
EST. AVERAGE GLUCOSE BLD GHB EST-MCNC: 209 MG/DL
GLOBULIN SER CALC-MCNC: 4.1 G/DL (ref 2.3–3.5)
GLUCOSE SERPL-MCNC: 118 MG/DL (ref 65–100)
HBA1C MFR BLD: 8.9 % (ref 4.2–6.3)
HDLC SERPL-MCNC: 32 MG/DL (ref 40–60)
HDLC SERPL: 4.8 {RATIO}
LDLC SERPL CALC-MCNC: 46 MG/DL
POTASSIUM SERPL-SCNC: 4.2 MMOL/L (ref 3.5–5.1)
PROT SERPL-MCNC: 7.5 G/DL (ref 6.3–8.2)
PSA SERPL-MCNC: 0.7 NG/ML
SODIUM SERPL-SCNC: 139 MMOL/L (ref 136–145)
TRIGL SERPL-MCNC: 375 MG/DL (ref 35–150)
TSH SERPL DL<=0.005 MIU/L-ACNC: 2.49 UIU/ML
VLDLC SERPL CALC-MCNC: 75 MG/DL (ref 6–23)

## 2022-01-04 PROCEDURE — 84153 ASSAY OF PSA TOTAL: CPT

## 2022-01-04 PROCEDURE — 80053 COMPREHEN METABOLIC PANEL: CPT

## 2022-01-04 PROCEDURE — 80061 LIPID PANEL: CPT

## 2022-01-04 PROCEDURE — 36415 COLL VENOUS BLD VENIPUNCTURE: CPT

## 2022-01-04 PROCEDURE — 84443 ASSAY THYROID STIM HORMONE: CPT

## 2022-01-04 PROCEDURE — 83036 HEMOGLOBIN GLYCOSYLATED A1C: CPT

## 2022-02-28 PROBLEM — E11.9 TYPE 2 DIABETES MELLITUS (HCC): Status: ACTIVE | Noted: 2022-02-28

## 2022-03-18 PROBLEM — E11.9 TYPE 2 DIABETES MELLITUS (HCC): Status: ACTIVE | Noted: 2022-02-28

## 2022-03-18 PROBLEM — Z95.820 S/P ANGIOPLASTY WITH STENT: Status: ACTIVE | Noted: 2021-02-16

## 2022-03-19 PROBLEM — G56.22 ENTRAPMENT OF LEFT ULNAR NERVE: Status: ACTIVE | Noted: 2019-05-13

## 2022-03-19 PROBLEM — I35.8 AORTIC VALVE SCLEROSIS: Status: ACTIVE | Noted: 2021-05-25

## 2022-03-19 PROBLEM — I25.118 CORONARY ARTERY DISEASE OF NATIVE ARTERY OF NATIVE HEART WITH STABLE ANGINA PECTORIS (HCC): Status: ACTIVE | Noted: 2021-01-12

## 2022-03-19 PROBLEM — I51.7 RIGHT HEART ENLARGEMENT: Status: ACTIVE | Noted: 2020-12-21

## 2022-03-19 PROBLEM — E78.2 MIXED DYSLIPIDEMIA: Status: ACTIVE | Noted: 2017-06-12

## 2022-03-19 PROBLEM — R06.09 DOE (DYSPNEA ON EXERTION): Status: ACTIVE | Noted: 2017-12-11

## 2022-03-20 PROBLEM — G56.02 CARPAL TUNNEL SYNDROME OF LEFT WRIST: Status: ACTIVE | Noted: 2019-05-13

## 2022-03-20 PROBLEM — E66.9 OBESITY (BMI 30-39.9): Status: ACTIVE | Noted: 2020-12-21

## 2022-03-20 PROBLEM — G47.33 OSA (OBSTRUCTIVE SLEEP APNEA): Status: ACTIVE | Noted: 2017-10-27

## 2022-04-20 ENCOUNTER — HOSPITAL ENCOUNTER (OUTPATIENT)
Dept: LAB | Age: 72
Discharge: HOME | End: 2022-04-20
Attending: PHYSICIAN ASSISTANT
Payer: MEDICARE

## 2022-04-20 DIAGNOSIS — I10 ESSENTIAL HYPERTENSION: ICD-10-CM

## 2022-04-20 DIAGNOSIS — E78.2 MIXED HYPERLIPIDEMIA: ICD-10-CM

## 2022-04-20 DIAGNOSIS — E03.9 ACQUIRED HYPOTHYROIDISM: ICD-10-CM

## 2022-04-20 DIAGNOSIS — I25.118 CORONARY ARTERY DISEASE OF NATIVE ARTERY OF NATIVE HEART WITH STABLE ANGINA PECTORIS (HCC): ICD-10-CM

## 2022-04-20 DIAGNOSIS — K21.9 GASTROESOPHAGEAL REFLUX DISEASE WITHOUT ESOPHAGITIS: ICD-10-CM

## 2022-04-20 LAB
ALBUMIN SERPL-MCNC: 2.9 G/DL (ref 3.2–4.6)
ALBUMIN/GLOB SERPL: 0.7 {RATIO} (ref 1.2–3.5)
ALP SERPL-CCNC: 43 U/L (ref 50–136)
ALT SERPL-CCNC: 34 U/L (ref 12–65)
ANION GAP SERPL CALC-SCNC: 5 MMOL/L (ref 7–16)
AST SERPL-CCNC: 30 U/L (ref 15–37)
BASOPHILS # BLD: 0 K/UL (ref 0–0.2)
BASOPHILS NFR BLD: 1 % (ref 0–2)
BILIRUB SERPL-MCNC: 0.3 MG/DL (ref 0.2–1.1)
BUN SERPL-MCNC: 15 MG/DL (ref 8–23)
CALCIUM SERPL-MCNC: 9.1 MG/DL (ref 8.3–10.4)
CHLORIDE SERPL-SCNC: 109 MMOL/L (ref 98–107)
CHOLEST SERPL-MCNC: 94 MG/DL
CO2 SERPL-SCNC: 30 MMOL/L (ref 21–32)
CREAT SERPL-MCNC: 1.16 MG/DL (ref 0.8–1.5)
CREAT UR-MCNC: 134 MG/DL
DIFFERENTIAL METHOD BLD: ABNORMAL
EOSINOPHIL # BLD: 0.1 K/UL (ref 0–0.8)
EOSINOPHIL NFR BLD: 3 % (ref 0.5–7.8)
ERYTHROCYTE [DISTWIDTH] IN BLOOD BY AUTOMATED COUNT: 12.8 % (ref 11.9–14.6)
EST. AVERAGE GLUCOSE BLD GHB EST-MCNC: 174 MG/DL
GLOBULIN SER CALC-MCNC: 4.3 G/DL (ref 2.3–3.5)
GLUCOSE SERPL-MCNC: 82 MG/DL (ref 65–100)
HBA1C MFR BLD: 7.7 % (ref 4.2–6.3)
HCT VFR BLD AUTO: 41.5 % (ref 41.1–50.3)
HDLC SERPL-MCNC: 38 MG/DL (ref 40–60)
HDLC SERPL: 2.5 {RATIO}
HGB BLD-MCNC: 13.1 G/DL (ref 13.6–17.2)
IMM GRANULOCYTES # BLD AUTO: 0 K/UL (ref 0–0.5)
IMM GRANULOCYTES NFR BLD AUTO: 0 % (ref 0–5)
LDLC SERPL CALC-MCNC: 34.4 MG/DL
LYMPHOCYTES # BLD: 2 K/UL (ref 0.5–4.6)
LYMPHOCYTES NFR BLD: 42 % (ref 13–44)
MCH RBC QN AUTO: 28.2 PG (ref 26.1–32.9)
MCHC RBC AUTO-ENTMCNC: 31.6 G/DL (ref 31.4–35)
MCV RBC AUTO: 89.2 FL (ref 79.6–97.8)
MICROALBUMIN UR-MCNC: 2.82 MG/DL
MICROALBUMIN/CREAT UR-RTO: 21 MG/G
MONOCYTES # BLD: 0.3 K/UL (ref 0.1–1.3)
MONOCYTES NFR BLD: 7 % (ref 4–12)
NEUTS SEG # BLD: 2.3 K/UL (ref 1.7–8.2)
NEUTS SEG NFR BLD: 48 % (ref 43–78)
NRBC # BLD: 0 K/UL (ref 0–0.2)
PLATELET # BLD AUTO: 219 K/UL (ref 150–450)
PMV BLD AUTO: 10.5 FL (ref 9.4–12.3)
POTASSIUM SERPL-SCNC: 4.4 MMOL/L (ref 3.5–5.1)
PROT SERPL-MCNC: 7.2 G/DL (ref 6.3–8.2)
RBC # BLD AUTO: 4.65 M/UL (ref 4.23–5.6)
SODIUM SERPL-SCNC: 144 MMOL/L (ref 136–145)
TRIGL SERPL-MCNC: 108 MG/DL (ref 35–150)
TSH SERPL DL<=0.005 MIU/L-ACNC: 1.5 UIU/ML
VLDLC SERPL CALC-MCNC: 21.6 MG/DL (ref 6–23)
WBC # BLD AUTO: 4.9 K/UL (ref 4.3–11.1)

## 2022-04-20 PROCEDURE — 80053 COMPREHEN METABOLIC PANEL: CPT

## 2022-04-20 PROCEDURE — 82043 UR ALBUMIN QUANTITATIVE: CPT

## 2022-04-20 PROCEDURE — 36415 COLL VENOUS BLD VENIPUNCTURE: CPT

## 2022-04-20 PROCEDURE — 83540 ASSAY OF IRON: CPT

## 2022-04-20 PROCEDURE — 85025 COMPLETE CBC W/AUTO DIFF WBC: CPT

## 2022-04-20 PROCEDURE — 80061 LIPID PANEL: CPT

## 2022-04-20 PROCEDURE — 83036 HEMOGLOBIN GLYCOSYLATED A1C: CPT

## 2022-04-20 PROCEDURE — 82728 ASSAY OF FERRITIN: CPT

## 2022-04-20 PROCEDURE — 84443 ASSAY THYROID STIM HORMONE: CPT

## 2022-04-25 ENCOUNTER — HOSPITAL ENCOUNTER (OUTPATIENT)
Dept: CT IMAGING | Age: 72
Discharge: HOME | End: 2022-04-25
Attending: PHYSICIAN ASSISTANT
Payer: MEDICARE

## 2022-04-25 DIAGNOSIS — A68.9 RECURRENT FEVER: ICD-10-CM

## 2022-04-25 DIAGNOSIS — R10.32 LLQ ABDOMINAL PAIN: ICD-10-CM

## 2022-04-25 LAB
FERRITIN SERPL-MCNC: 105 NG/ML (ref 8–388)
IRON SATN MFR SERPL: 29 %
IRON SERPL-MCNC: 103 UG/DL (ref 35–150)
TIBC SERPL-MCNC: 358 UG/DL (ref 250–450)

## 2022-04-25 PROCEDURE — 74011000258 HC RX REV CODE- 258: Performed by: PHYSICIAN ASSISTANT

## 2022-04-25 PROCEDURE — 74011000636 HC RX REV CODE- 636: Performed by: PHYSICIAN ASSISTANT

## 2022-04-25 PROCEDURE — 74177 CT ABD & PELVIS W/CONTRAST: CPT

## 2022-04-25 RX ORDER — SODIUM CHLORIDE 0.9 % (FLUSH) 0.9 %
10 SYRINGE (ML) INJECTION
Status: COMPLETED | OUTPATIENT
Start: 2022-04-25 | End: 2022-04-25

## 2022-04-25 RX ADMIN — SODIUM CHLORIDE 100 ML: 9 INJECTION, SOLUTION INTRAVENOUS at 13:25

## 2022-04-25 RX ADMIN — DIATRIZOATE MEGLUMINE AND DIATRIZOATE SODIUM 15 ML: 660; 100 LIQUID ORAL; RECTAL at 13:25

## 2022-04-25 RX ADMIN — IOPAMIDOL 100 ML: 755 INJECTION, SOLUTION INTRAVENOUS at 13:25

## 2022-04-25 RX ADMIN — Medication 10 ML: at 13:25

## 2022-04-25 NOTE — PROGRESS NOTES
Iron levels look fine so we will just monitor his hemoglobin for now with repeat study with next blood draw.

## 2022-04-25 NOTE — PROGRESS NOTES
Pt notified that his CT scan confirms acute diverticulitis. We need to change antibiotics - will send in Cipro which he will take twice daily with meals + Flagyl which he will take 3 times/day, discontinue Augmentin. Pt verbalized understanding.

## 2022-04-25 NOTE — PROGRESS NOTES
CT scan confirms acute diverticulitis. We need to change antibiotics - will send in Cipro + Flagyl which he will take twice daily with meals, discontinue Augmentin.

## 2022-04-26 NOTE — PROGRESS NOTES
Pt notified that his iron levels look fine so we will just monitor his hemoglobin for now with repeat study with next blood draw. Pt verbalized understanding.

## 2022-07-25 RX ORDER — VALSARTAN 320 MG/1
TABLET ORAL
Qty: 90 TABLET | Refills: 1 | OUTPATIENT
Start: 2022-07-25

## 2022-08-22 DIAGNOSIS — I25.118 CORONARY ARTERY DISEASE OF NATIVE ARTERY OF NATIVE HEART WITH STABLE ANGINA PECTORIS (HCC): ICD-10-CM

## 2022-08-22 DIAGNOSIS — E03.9 ACQUIRED HYPOTHYROIDISM: ICD-10-CM

## 2022-08-22 DIAGNOSIS — I10 ESSENTIAL HYPERTENSION: Primary | ICD-10-CM

## 2022-08-22 DIAGNOSIS — E78.2 MIXED HYPERLIPIDEMIA: ICD-10-CM

## 2022-08-22 DIAGNOSIS — R71.0 DECREASED HEMOGLOBIN: ICD-10-CM

## 2022-08-22 DIAGNOSIS — E11.51 TYPE 2 DIABETES MELLITUS WITH PERIPHERAL CIRCULATORY DISORDER (HCC): ICD-10-CM

## 2022-08-23 DIAGNOSIS — E78.2 MIXED HYPERLIPIDEMIA: Primary | ICD-10-CM

## 2022-08-23 LAB
BASOPHILS # BLD: 0 K/UL (ref 0–0.2)
BASOPHILS NFR BLD: 1 % (ref 0–2)
DIFFERENTIAL METHOD BLD: NORMAL
EOSINOPHIL # BLD: 0.1 K/UL (ref 0–0.8)
EOSINOPHIL NFR BLD: 2 % (ref 0.5–7.8)
ERYTHROCYTE [DISTWIDTH] IN BLOOD BY AUTOMATED COUNT: 13 % (ref 11.9–14.6)
HCT VFR BLD AUTO: 44.1 % (ref 41.1–50.3)
HGB BLD-MCNC: 14.3 G/DL (ref 13.6–17.2)
IMM GRANULOCYTES # BLD AUTO: 0 K/UL (ref 0–0.5)
IMM GRANULOCYTES NFR BLD AUTO: 0 % (ref 0–5)
LYMPHOCYTES # BLD: 2 K/UL (ref 0.5–4.6)
LYMPHOCYTES NFR BLD: 36 % (ref 13–44)
MCH RBC QN AUTO: 29.2 PG (ref 26.1–32.9)
MCHC RBC AUTO-ENTMCNC: 32.4 G/DL (ref 31.4–35)
MCV RBC AUTO: 90.2 FL (ref 79.6–97.8)
MONOCYTES # BLD: 0.5 K/UL (ref 0.1–1.3)
MONOCYTES NFR BLD: 8 % (ref 4–12)
NEUTS SEG # BLD: 3 K/UL (ref 1.7–8.2)
NEUTS SEG NFR BLD: 53 % (ref 43–78)
NRBC # BLD: 0 K/UL (ref 0–0.2)
PLATELET # BLD AUTO: 176 K/UL (ref 150–450)
PMV BLD AUTO: 11.8 FL (ref 9.4–12.3)
RBC # BLD AUTO: 4.89 M/UL (ref 4.23–5.6)
WBC # BLD AUTO: 5.7 K/UL (ref 4.3–11.1)

## 2022-08-23 RX ORDER — ROSUVASTATIN CALCIUM 40 MG/1
40 TABLET, COATED ORAL DAILY
Qty: 90 TABLET | Refills: 0 | Status: SHIPPED | OUTPATIENT
Start: 2022-08-23 | End: 2022-08-30 | Stop reason: SDUPTHER

## 2022-08-23 NOTE — TELEPHONE ENCOUNTER
Pt's spouse called requesting refills on Rosuvastatin, please. He does not have enough to last until his upcoming appt.

## 2022-08-24 LAB
25(OH)D3 SERPL-MCNC: 16.1 NG/ML (ref 30–100)
ALBUMIN SERPL-MCNC: 4 G/DL (ref 3.2–4.6)
ALBUMIN/GLOB SERPL: 1.1 {RATIO} (ref 1.2–3.5)
ALP SERPL-CCNC: 44 U/L (ref 50–136)
ALT SERPL-CCNC: 53 U/L (ref 12–65)
ANION GAP SERPL CALC-SCNC: 8 MMOL/L (ref 7–16)
AST SERPL-CCNC: 28 U/L (ref 15–37)
BILIRUB SERPL-MCNC: 0.3 MG/DL (ref 0.2–1.1)
BUN SERPL-MCNC: 26 MG/DL (ref 8–23)
CALCIUM SERPL-MCNC: 9.6 MG/DL (ref 8.3–10.4)
CHLORIDE SERPL-SCNC: 108 MMOL/L (ref 98–107)
CHOLEST SERPL-MCNC: 181 MG/DL
CO2 SERPL-SCNC: 25 MMOL/L (ref 21–32)
CREAT SERPL-MCNC: 1.3 MG/DL (ref 0.8–1.5)
EST. AVERAGE GLUCOSE BLD GHB EST-MCNC: 203 MG/DL
FOLATE SERPL-MCNC: 12.6 NG/ML (ref 3.1–17.5)
GLOBULIN SER CALC-MCNC: 3.5 G/DL (ref 2.3–3.5)
GLUCOSE SERPL-MCNC: 108 MG/DL (ref 65–100)
HBA1C MFR BLD: 8.7 % (ref 4.8–5.6)
HDLC SERPL-MCNC: 40 MG/DL (ref 40–60)
HDLC SERPL: 4.5 {RATIO}
LDLC SERPL CALC-MCNC: 89.4 MG/DL
POTASSIUM SERPL-SCNC: 4.4 MMOL/L (ref 3.5–5.1)
PROT SERPL-MCNC: 7.5 G/DL (ref 6.3–8.2)
SODIUM SERPL-SCNC: 141 MMOL/L (ref 138–145)
TRIGL SERPL-MCNC: 258 MG/DL (ref 35–150)
TSH, 3RD GENERATION: 1.8 UIU/ML (ref 0.36–3.74)
VIT B12 SERPL-MCNC: 274 PG/ML (ref 193–986)
VLDLC SERPL CALC-MCNC: 51.6 MG/DL (ref 6–23)

## 2022-08-29 ENCOUNTER — OFFICE VISIT (OUTPATIENT)
Dept: CARDIOLOGY CLINIC | Age: 72
End: 2022-08-29
Payer: MEDICARE

## 2022-08-29 VITALS
DIASTOLIC BLOOD PRESSURE: 80 MMHG | HEIGHT: 66 IN | SYSTOLIC BLOOD PRESSURE: 156 MMHG | HEART RATE: 72 BPM | BODY MASS INDEX: 32.88 KG/M2 | WEIGHT: 204.6 LBS

## 2022-08-29 DIAGNOSIS — I25.118 CORONARY ARTERY DISEASE OF NATIVE ARTERY OF NATIVE HEART WITH STABLE ANGINA PECTORIS (HCC): Primary | ICD-10-CM

## 2022-08-29 DIAGNOSIS — Z95.820 S/P ANGIOPLASTY WITH STENT: ICD-10-CM

## 2022-08-29 DIAGNOSIS — R06.09 DOE (DYSPNEA ON EXERTION): ICD-10-CM

## 2022-08-29 DIAGNOSIS — E11.9 TYPE 2 DIABETES MELLITUS WITHOUT COMPLICATION, WITH LONG-TERM CURRENT USE OF INSULIN (HCC): ICD-10-CM

## 2022-08-29 DIAGNOSIS — I35.8 AORTIC VALVE SCLEROSIS: ICD-10-CM

## 2022-08-29 DIAGNOSIS — I10 PRIMARY HYPERTENSION: ICD-10-CM

## 2022-08-29 DIAGNOSIS — E78.2 MIXED DYSLIPIDEMIA: ICD-10-CM

## 2022-08-29 DIAGNOSIS — Z79.4 TYPE 2 DIABETES MELLITUS WITHOUT COMPLICATION, WITH LONG-TERM CURRENT USE OF INSULIN (HCC): ICD-10-CM

## 2022-08-29 PROBLEM — N18.30 CHRONIC RENAL DISEASE, STAGE III (HCC): Status: ACTIVE | Noted: 2022-08-29

## 2022-08-29 PROCEDURE — 1123F ACP DISCUSS/DSCN MKR DOCD: CPT | Performed by: INTERNAL MEDICINE

## 2022-08-29 PROCEDURE — 3017F COLORECTAL CA SCREEN DOC REV: CPT | Performed by: INTERNAL MEDICINE

## 2022-08-29 PROCEDURE — 4004F PT TOBACCO SCREEN RCVD TLK: CPT | Performed by: INTERNAL MEDICINE

## 2022-08-29 PROCEDURE — 3052F HG A1C>EQUAL 8.0%<EQUAL 9.0%: CPT | Performed by: INTERNAL MEDICINE

## 2022-08-29 PROCEDURE — G8417 CALC BMI ABV UP PARAM F/U: HCPCS | Performed by: INTERNAL MEDICINE

## 2022-08-29 PROCEDURE — 93000 ELECTROCARDIOGRAM COMPLETE: CPT | Performed by: INTERNAL MEDICINE

## 2022-08-29 PROCEDURE — 99214 OFFICE O/P EST MOD 30 MIN: CPT | Performed by: INTERNAL MEDICINE

## 2022-08-29 PROCEDURE — G8428 CUR MEDS NOT DOCUMENT: HCPCS | Performed by: INTERNAL MEDICINE

## 2022-08-29 PROCEDURE — 2022F DILAT RTA XM EVC RTNOPTHY: CPT | Performed by: INTERNAL MEDICINE

## 2022-08-29 RX ORDER — NEBIVOLOL 10 MG/1
10 TABLET ORAL DAILY
Qty: 90 TABLET | Refills: 1 | Status: SHIPPED | OUTPATIENT
Start: 2022-08-29

## 2022-08-29 RX ORDER — CHLORTHALIDONE 25 MG/1
25 TABLET ORAL DAILY
Qty: 90 TABLET | Refills: 3 | Status: SHIPPED | OUTPATIENT
Start: 2022-08-29

## 2022-08-29 RX ORDER — OMEGA-3-ACID ETHYL ESTERS 1 G/1
2 CAPSULE, LIQUID FILLED ORAL 2 TIMES DAILY
Qty: 360 CAPSULE | Refills: 0 | Status: SHIPPED | OUTPATIENT
Start: 2022-08-29

## 2022-08-29 ASSESSMENT — ENCOUNTER SYMPTOMS: SHORTNESS OF BREATH: 0

## 2022-08-29 NOTE — PROGRESS NOTES
[address], [address] [employer] [facility], [address] [place] [address]  PHONE: [phone]    Rosalie Walter  1950      SUBJECTIVE:   Elaina Keene is a 71 y.o. male seen for a follow up visit regarding the following:     Chief Complaint   Patient presents with    Coronary Artery Disease     6mth follow up       HPI:    71-year-old male comes back for follow-up had a history of a prior stent and hypertension diabetes. He had a stress echo earlier this year and had no ischemic changes but he had significant hypertensive post exercise with blood pressure got up to 40. He still been running high at home. He has hemoglobin A1c has gone up over 8 is little bit out-of-control course his triglycerides have gone higher as with it. He denies chest pain but when [place] heals he is little short of breath. Past Medical History, Past Surgical History, Family history, Social History, and Medications were all reviewed with the patient today and updated as necessary.        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    GERD (gastroesophageal reflux disease)     Hyperlipidemia     Hypertension     essential    Mild aortic valve sclerosis 12/15/2020    Obstructive sleep apnea on CPAP     Squamous cell carcinoma of head and neck ([facility] [address] 06/2020    Right Hairline    Type 2 diabetes mellitus with hyperglycemia ([address] 10/28/2015     Past Surgical History:   Procedure Laterality Date    COLONOSCOPY N/A 10/16/2017 COLONOSCOPY performed by Teresita Kiser MD at [address] [place] [address],[address] [facility]  01/12/2021    PCI X 2 and balloon    TONSILLECTOMY      senior in high school    UPPER GASTROINTESTINAL ENDOSCOPY  06/19/2009    Hiatal Hernia     Family History   Problem Relation Age of Onset    Heart Disease Father 61    No Known Problems Brother     Lung Disease Brother         Smoker      Social History     Tobacco Use    Smoking status: Never    Smokeless tobacco: Never   Substance Use Topics    Alcohol use: Never       ROS:    Review of Systems   Constitutional: Negative for decreased appetite and weight loss. Cardiovascular:  Positive for dyspnea on exertion. Negative for chest pain, leg swelling and near-syncope. Respiratory:  Negative for shortness of breath. Hematologic/Lymphatic: Negative for bleeding problem. PHYSICAL EXAM:    BP (!) 150/76   Pulse 72   Ht 5' 6\" (1.676 m)   Wt 204 lb 9.6 oz (92.8 kg)   BMI 33.02 kg/m²        Wt Readings from Last 3 Encounters:   08/29/22 204 lb 9.6 oz (92.8 kg)   04/25/22 200 lb (90.7 kg)   03/14/22 199 lb 3.2 oz (90.4 kg)     BP Readings from Last 3 Encounters:   08/29/22 (!) 150/76   04/25/22 (!) 154/78   03/14/22 (!) 155/82         Physical Exam  Constitutional:       General: He is not in acute distress. Cardiovascular:      Rate and Rhythm: Normal rate and regular rhythm. Heart sounds: Murmur heard. Early systolic murmur is present with a grade of 1/6. No gallop. Pulmonary:      Effort: Pulmonary effort is normal.      Breath sounds: Normal breath sounds. No rales. Abdominal:      General: There is no distension. Tenderness: There is no abdominal tenderness. Musculoskeletal:         General: No swelling. Neurological:      General: No focal deficit present. Mental Status: He is alert. Medical problems and test results were reviewed with the patient today.      Results for orders placed or performed in visit on 08/29/22   EKG 12 Lead    Impression    Normal sinus rhythm rate of 70. Mild increased voltage. No significant ST changes     Lab Results   Component Value Date/Time     08/22/2022 04:34 PM    K 4.4 08/22/2022 04:34 PM     08/22/2022 04:34 PM    CO2 25 08/22/2022 04:34 PM    BUN 26 08/22/2022 04:34 PM    GFRAA >60 08/22/2022 04:34 PM     Lab Results   Component Value Date/Time    CHOL 181 08/22/2022 04:34 PM    HDL 40 08/22/2022 04:34 PM         ASSESSMENT and PLAN    Randi Morocho was seen today for coronary artery disease. Diagnoses and all orders for this visit:    Coronary artery disease of native artery of native heart with stable angina pectoris ([facility] [address] stress echo earlier this year with no ischemic changes his blood pressure got significantly elevated I think this part of his issue here with shortness of breath  -     EKG 12 Lead    Hypertension blood pressure still high please control. I will add chlorthalidone to his regimen that goes along with the Norvasc 10 and valsartan. This certainly good first-line therapy. Metoprolol may not be the best antihypertensive drug therefore we will switch it out to Bystolic she will get a better response from that also we will do some screening test including a renal vascular ultrasound looking for renal artery stenosis and some aldosterone levels  -     EKG 12 Lead  -     Vascular duplex renal arterial complete; Future  -     Aldosterone & Renin, Direct with Ratio; Future    Aortic valve sclerosis  Has had mild sclerosis no significant changes  Type 2 diabetes mellitus without complication, with long-term current use of insulin ([facility] [address] diabetes multiple more poorly controlled I told go back to his family doctor    S/P angioplasty with stent he has been very stable post stenting    Mixed dyslipidemia he has cholesterol and numbers a little higher and his diabetes is out of control we will continue his fenofibrate and his statin therapy at high dose.     ROBB (dyspnea on exertion) this is been unchanged I think part of it is related to hypertension we need to work on get that down    Other orders  -     chlorthalidone (HYGROTON) 25 MG tablet; Take 1 tablet by mouth daily  -     nebivolol (BYSTOLIC) 10 MG tablet; Take 1 tablet by mouth daily      [unfilled]      No follow-up provider specified.     Carmen Landaverde MD  8/29/2022  9:04 AM

## 2022-08-30 ENCOUNTER — OFFICE VISIT (OUTPATIENT)
Dept: INTERNAL MEDICINE CLINIC | Facility: CLINIC | Age: 72
End: 2022-08-30
Payer: MEDICARE

## 2022-08-30 VITALS
WEIGHT: 203 LBS | TEMPERATURE: 97.2 F | DIASTOLIC BLOOD PRESSURE: 70 MMHG | HEIGHT: 66 IN | BODY MASS INDEX: 32.62 KG/M2 | SYSTOLIC BLOOD PRESSURE: 132 MMHG | OXYGEN SATURATION: 98 % | HEART RATE: 57 BPM

## 2022-08-30 DIAGNOSIS — M21.942 ACQUIRED DEFORMITY OF BOTH HANDS: ICD-10-CM

## 2022-08-30 DIAGNOSIS — E11.51 TYPE 2 DIABETES MELLITUS WITH PERIPHERAL CIRCULATORY DISORDER (HCC): Primary | ICD-10-CM

## 2022-08-30 DIAGNOSIS — I25.118 CORONARY ARTERY DISEASE OF NATIVE ARTERY OF NATIVE HEART WITH STABLE ANGINA PECTORIS (HCC): ICD-10-CM

## 2022-08-30 DIAGNOSIS — E53.8 VITAMIN B12 DEFICIENCY: ICD-10-CM

## 2022-08-30 DIAGNOSIS — E78.2 MIXED HYPERLIPIDEMIA: ICD-10-CM

## 2022-08-30 DIAGNOSIS — I10 ESSENTIAL HYPERTENSION: ICD-10-CM

## 2022-08-30 DIAGNOSIS — E03.9 ACQUIRED HYPOTHYROIDISM: ICD-10-CM

## 2022-08-30 DIAGNOSIS — M21.941 ACQUIRED DEFORMITY OF BOTH HANDS: ICD-10-CM

## 2022-08-30 DIAGNOSIS — E55.9 VITAMIN D DEFICIENCY: ICD-10-CM

## 2022-08-30 PROBLEM — I27.20 PULMONARY HYPERTENSION, UNSPECIFIED (HCC): Status: ACTIVE | Noted: 2022-08-30

## 2022-08-30 PROCEDURE — 3017F COLORECTAL CA SCREEN DOC REV: CPT | Performed by: PHYSICIAN ASSISTANT

## 2022-08-30 PROCEDURE — G8417 CALC BMI ABV UP PARAM F/U: HCPCS | Performed by: PHYSICIAN ASSISTANT

## 2022-08-30 PROCEDURE — 1036F TOBACCO NON-USER: CPT | Performed by: PHYSICIAN ASSISTANT

## 2022-08-30 PROCEDURE — 2022F DILAT RTA XM EVC RTNOPTHY: CPT | Performed by: PHYSICIAN ASSISTANT

## 2022-08-30 PROCEDURE — 1123F ACP DISCUSS/DSCN MKR DOCD: CPT | Performed by: PHYSICIAN ASSISTANT

## 2022-08-30 PROCEDURE — 99215 OFFICE O/P EST HI 40 MIN: CPT | Performed by: PHYSICIAN ASSISTANT

## 2022-08-30 PROCEDURE — 3052F HG A1C>EQUAL 8.0%<EQUAL 9.0%: CPT | Performed by: PHYSICIAN ASSISTANT

## 2022-08-30 PROCEDURE — G8427 DOCREV CUR MEDS BY ELIG CLIN: HCPCS | Performed by: PHYSICIAN ASSISTANT

## 2022-08-30 RX ORDER — OMEPRAZOLE 40 MG/1
40 CAPSULE, DELAYED RELEASE ORAL
Qty: 90 CAPSULE | Refills: 3 | Status: SHIPPED | OUTPATIENT
Start: 2022-08-30

## 2022-08-30 RX ORDER — LEVOTHYROXINE SODIUM 112 UG/1
56 TABLET ORAL
Qty: 45 TABLET | Refills: 3 | Status: SHIPPED | OUTPATIENT
Start: 2022-08-30

## 2022-08-30 RX ORDER — ROSUVASTATIN CALCIUM 40 MG/1
40 TABLET, COATED ORAL DAILY
Qty: 90 TABLET | Refills: 3 | Status: SHIPPED | OUTPATIENT
Start: 2022-08-30

## 2022-08-30 RX ORDER — VALSARTAN 320 MG/1
320 TABLET ORAL DAILY
Qty: 90 TABLET | Refills: 3 | Status: SHIPPED | OUTPATIENT
Start: 2022-08-30

## 2022-08-30 RX ORDER — FENOFIBRATE 134 MG/1
134 CAPSULE ORAL NIGHTLY
Qty: 90 CAPSULE | Refills: 3 | Status: SHIPPED | OUTPATIENT
Start: 2022-08-30

## 2022-08-30 ASSESSMENT — PATIENT HEALTH QUESTIONNAIRE - PHQ9
SUM OF ALL RESPONSES TO PHQ QUESTIONS 1-9: 0
2. FEELING DOWN, DEPRESSED OR HOPELESS: 0
SUM OF ALL RESPONSES TO PHQ9 QUESTIONS 1 & 2: 0
SUM OF ALL RESPONSES TO PHQ QUESTIONS 1-9: 0
1. LITTLE INTEREST OR PLEASURE IN DOING THINGS: 0
SUM OF ALL RESPONSES TO PHQ QUESTIONS 1-9: 0
SUM OF ALL RESPONSES TO PHQ QUESTIONS 1-9: 0

## 2022-08-30 ASSESSMENT — ENCOUNTER SYMPTOMS
DIARRHEA: 0
ABDOMINAL DISTENTION: 0
ABDOMINAL PAIN: 0
NAUSEA: 0
CONSTIPATION: 0
VOMITING: 0
SHORTNESS OF BREATH: 1

## 2022-08-30 NOTE — PROGRESS NOTES
Mike William (:  1950) is a 71 y.o. male,Established patient, here for evaluation of the following chief complaint(s):  Follow-up (Diabetes, Hypertension, Hyperlipidemia, Hypothyroidism)         ASSESSMENT/PLAN:  1. Type 2 diabetes mellitus with peripheral circulatory disorder (HCC)  -     Comprehensive Metabolic Panel; Future  -     Hemoglobin A1C; Future  2. Acquired deformity of both hands  -     External Referral to Hand Surgery  3. Mixed hyperlipidemia  -     rosuvastatin (CRESTOR) 40 MG tablet; Take 1 tablet by mouth daily, Disp-90 tablet, R-3Normal  -     Comprehensive Metabolic Panel; Future  -     Lipid Panel; Future  4. Coronary artery disease of native artery of native heart with stable angina pectoris ([facility] [address]  -     Lipid Panel; Future  5. Acquired hypothyroidism  -     TSH; Future  6. Essential hypertension  -     CBC with Auto Differential; Future  -     Comprehensive Metabolic Panel; Future  7. Vitamin D deficiency  -     Vitamin D 25 Hydroxy; Future  8.  Vitamin B12 deficiency  -     Vitamin B12; Future      Patient Instructions   Trial switch to Toujeo 92 units every morning - sample given  Add OTC vitamin d3 5000 iu daily  Add OTC sublingual or quick dissolve vitamin B12 supplement and dissolve under tongue once daily  Encouraged to add an extra day of walking for exercise to total 3 days/week minimum x 20-30 minutes  Urged to evaluate diet in detail as there appears to be something that is different over the past 4 months that has directly impacted his glucose & triglyceride control - sugars, sweets/desserts, snacks, junk foods, portion size, etc  Reminded to ensure that Jacqulin Dynes is given BEFORE meal as this modification contributed to improved glucose control last spring  Stay well hydrated with plenty of water - this is going to be even more important now that cardiology added a diuretic in addition to the [place] he already takes  Monitor blood sugar frequently during 2 week trial of Freestyle Christopher  Continue chronic medications as prescribed  Monitor blood pressure daily and keep record to bring to next appointment  Resume a nightly snack but consider rotating between cheese sticks (reduced fat or string cheese which is usually made with lower fat milk), hard boiled egg, nuts, etc      Return in about 1 month (around 9/30/2022) for diabetes f/u. Subjective   SUBJECTIVE/OBJECTIVE:  The patient is a 71 y.o. male who is seen for follow up of diabetes. Current monitoring regimen: BGs are high in the morning. Glucose monitoring frequency: 3 times daily  Home blood sugar records: fasting range: 140-220, pre-dinner range: 100-180, bedtime range: 120-200  Any episodes of hypoglycemia? no  Known diabetic complications: cardiovascular disease  Current diabetic medications include: oral agent (monotherapy): Jardiance 12.5 mg (1/2 of 25 mg tablet) daily, insulin injections: Tresiba 92 units q AM + Fiasp 14 units BEFORE each meal plus additional as determined by sliding scale parameters. Current Eye Exam (within one year): Yes - Nov 2021  Current Urine Microalbumin: Yes, abnormal - April 2022  Is She on ACE inhibitor or angiotensin II receptor blocker? Yes - valsartan (Diovan)  Current Foot Exam (within one year): Yes - Nov 2021      Weight trend: increased a bit  Prior visit with dietician: yes - 2016  Current diet: meals per day on average: 2-3;  restricting intake of the following: sweets, bread, & pasta; maintaining portion control  Current exercise: walking 2 times/week x 20-25 minutes        Last HbA1C:    Lab Results   Component Value Date    LABA1C 8.7 (H) 08/22/2022     Lab Results   Component Value Date     08/22/2022       The patient is a 71 y.o. male who is seen for evaluation of hyperlipidemia. He was tested because of mixed hyperlipidemia + diabetes + CAD.   The current state of this condition is no significant medication side effects noted and poorly controlled on Crestor 40 mg q hs + Fenofibrate 134 mg daily + Lovaza 2 grams bid - taking as prescribed. Last Lipid Panel:   Lab Results   Component Value Date    CHOL 181 08/22/2022    CHOL 94 04/20/2022    CHOL 153 01/04/2022     Lab Results   Component Value Date    TRIG 258 (H) 08/22/2022    TRIG 108 04/20/2022    TRIG 375 (H) 01/04/2022     Lab Results   Component Value Date    HDL 40 08/22/2022    HDL 38 (L) 04/20/2022    HDL 32 (L) 01/04/2022     Lab Results   Component Value Date    LDLCALC 89.4 08/22/2022    LDLCALC 34.4 04/20/2022    LDLCALC 46 01/04/2022     Lab Results   Component Value Date    LABVLDL 51.6 (H) 08/22/2022    LABVLDL 21.6 04/20/2022    LABVLDL 75 (H) 01/04/2022     Lab Results   Component Value Date    CHOLHDLRATIO 4.5 08/22/2022    CHOLHDLRATIO 2.5 04/20/2022    CHOLHDLRATIO 4.8 01/04/2022       The patient is a 71 y.o. male who is seen for follow-up of hypertension. He is exercising and is somewhat adherent to low salt diet. Daily caffiene intake: a known amount (1 serving/day). Current medication regimen consists of: Diovan 320 mg daily + Amlodipine 10 mg daily + Chlorthalidone 25 mg daily + Bystolic 10 mg daily. Blood pressure is not well controlled at home, ranging 140-170's/70-80's. BP Readings from Last 3 Encounters:   08/30/22 (!) 140/70   08/29/22 (!) 156/80   04/25/22 (!) 154/78       The patient is a 71 y.o. male who is seen for follow up of hypothyroidism. Current symptoms: weight changes. Symptoms are waxing and waning but worse overall. The patient is following treatment regimen with good compliance. Current treatment regimen consists of Levothyroxine 56 mcg (1/2 of 112 mcg tablet) daily and is  taking it first thing in the AM on an empty stomach with other medications.     Lab Results   Component Value Date    QLI3GTB 1.800 08/22/2022    TSH 1.500 04/20/2022    TSH 2.490 01/04/2022    TSH 1.190 09/15/2021     No results found for: T4FREE  No results found for: TGAB      Patient is here for follow-up of GERD. The current state of this condition is no significant medication side effects noted and well controlled on Omeprazole 40 mg q AM - taking as prescribed. Previous work-up included endoscopy in June 2009 with findings of hiatal hernia. Additional concerns addressed today include contracture of R 5th finger & L 3rd, 4th, & 5th fingers. He denies pain or getting caught in flexed position but he is concerned about the potential of losing function of his hands. He was evaluated by ortho - Dr. Lebron Smiley who diagnosed palmar fascial fibromatosis (dupuytren) and offered treatment options including observation, therapy, and NSAIDs with patient choosing observation. He desires a 2nd opinion. Review of Systems   Constitutional:  Positive for unexpected weight change (gained). Negative for fatigue. Eyes:  Negative for visual disturbance. Respiratory:  Positive for shortness of breath (with moderate exertion). Cardiovascular:  Negative for chest pain, palpitations and leg swelling. Gastrointestinal:  Negative for abdominal distention, abdominal pain, constipation, diarrhea, nausea and vomiting. Negative for heartburn   Endocrine: Negative for cold intolerance, heat intolerance, polydipsia and polyuria. Negative hair loss   Musculoskeletal:  Negative for arthralgias, joint swelling and myalgias. Neurological:  Positive for headaches (frontal dull this past week). Negative for dizziness and numbness. BP (!) 140/70 (Site: Left Upper Arm, Position: Sitting, Cuff Size: Large Adult)   Pulse 57   Temp 97.2 °F (36.2 °C) (Temporal)   Ht 5' 6\" (1.676 m)   Wt 203 lb (92.1 kg)   SpO2 98%   BMI 32.77 kg/m²       /70   Pulse 57   Temp 97.2 °F (36.2 °C) (Temporal)   Ht 5' 6\" (1.676 m)   Wt 203 lb (92.1 kg)   SpO2 98%   BMI 32.77 kg/m²       Objective   Physical Exam  Constitutional:       Appearance: Normal appearance.    HENT: Head: Normocephalic and atraumatic. Eyes:      Conjunctiva/sclera: Conjunctivae normal.      Pupils: Pupils are equal, round, and reactive to light. Neck:      Vascular: No carotid bruit. Cardiovascular:      Rate and Rhythm: Normal rate and regular rhythm. Heart sounds: Normal heart sounds. Pulmonary:      Effort: Pulmonary effort is normal.      Breath sounds: Normal breath sounds. Musculoskeletal:         General: Normal range of motion. Cervical back: Normal range of motion. Skin:     General: Skin is warm and dry. Neurological:      Mental Status: He is alert and oriented to person, place, and time. Psychiatric:         Mood and Affect: Mood normal.         Behavior: Behavior normal.         Thought Content: Thought content normal.         Judgment: Judgment normal.          On this date 8/30/2022 I have spent 60 minutes reviewing previous notes, test results and face to face with the patient discussing the diagnosis and importance of compliance with the treatment plan as well as documenting on the day of the visit. An electronic signature was used to authenticate this note.     --Lacie Guerra PA-C

## 2022-08-30 NOTE — PATIENT INSTRUCTIONS
Trial switch to Toujeo 92 units every morning - sample given  Add OTC vitamin d3 5000 iu daily  Add OTC sublingual or quick dissolve vitamin B12 supplement and dissolve under tongue once daily  Encouraged to add an extra day of walking for exercise to total 3 days/week minimum x 20-30 minutes  Urged to evaluate diet in detail as there appears to be something that is different over the past 4 months that has directly impacted his glucose & triglyceride control - sugars, sweets/desserts, snacks, junk foods, portion size, etc  Reminded to ensure that [place] is given BEFORE meal as this modification contributed to improved glucose control last spring  Stay well hydrated with plenty of water - this is going to be even more important now that cardiology added a diuretic in addition to the [place] he already takes  Monitor blood sugar frequently during 2 week trial of Freestyle Christopher  Continue chronic medications as prescribed  Monitor blood pressure daily and keep record to bring to next appointment  Resume a nightly snack but consider rotating between cheese sticks (reduced fat or string cheese which is usually made with lower fat milk), hard boiled egg, nuts, etc

## 2022-09-20 ENCOUNTER — HOSPITAL ENCOUNTER (OUTPATIENT)
Dept: ULTRASOUND IMAGING | Age: 72
Discharge: HOME | End: 2022-09-23
Payer: MEDICARE

## 2022-09-20 DIAGNOSIS — I10 PRIMARY HYPERTENSION: ICD-10-CM

## 2022-09-20 PROCEDURE — 93975 VASCULAR STUDY: CPT

## 2022-09-26 ENCOUNTER — TELEPHONE (OUTPATIENT)
Dept: CARDIOLOGY CLINIC | Age: 72
End: 2022-09-26

## 2022-09-26 NOTE — TELEPHONE ENCOUNTER
----- Message from Ondina Maria MD sent at 9/20/2022  6:41 PM EDT -----  Call pt renal ultrasound did not suggest renal artery stenosis or narrowing of the kidney arteries  9/26/22 Called and informed pt of above results.   Pt v/u

## 2022-11-21 DIAGNOSIS — E78.2 MIXED HYPERLIPIDEMIA: ICD-10-CM

## 2022-11-21 RX ORDER — OMEGA-3-ACID ETHYL ESTERS 1 G/1
2 CAPSULE, LIQUID FILLED ORAL 2 TIMES DAILY
Qty: 360 CAPSULE | Refills: 0 | Status: SHIPPED | OUTPATIENT
Start: 2022-11-21

## 2022-11-21 NOTE — TELEPHONE ENCOUNTER
We missed his 1 month diabetes follow-up originally planned for end of September but now labs are due and he is due back for 3 month diabetes f/u. Please schedule at next available and arrange for fasting labs to be done 3-7 days prior at location of his choice. Refill sent but no further refills until seen - will need to see him before the end of the year!

## 2022-11-29 ENCOUNTER — OFFICE VISIT (OUTPATIENT)
Dept: CARDIOLOGY CLINIC | Age: 72
End: 2022-11-29
Payer: MEDICARE

## 2022-11-29 VITALS
HEART RATE: 64 BPM | BODY MASS INDEX: 32.95 KG/M2 | WEIGHT: 205 LBS | DIASTOLIC BLOOD PRESSURE: 76 MMHG | SYSTOLIC BLOOD PRESSURE: 118 MMHG | HEIGHT: 66 IN

## 2022-11-29 DIAGNOSIS — I25.118 CORONARY ARTERY DISEASE OF NATIVE ARTERY OF NATIVE HEART WITH STABLE ANGINA PECTORIS (HCC): ICD-10-CM

## 2022-11-29 DIAGNOSIS — Z95.820 S/P ANGIOPLASTY WITH STENT: ICD-10-CM

## 2022-11-29 DIAGNOSIS — E78.2 MIXED DYSLIPIDEMIA: ICD-10-CM

## 2022-11-29 DIAGNOSIS — Z79.4 TYPE 2 DIABETES MELLITUS WITHOUT COMPLICATION, WITH LONG-TERM CURRENT USE OF INSULIN (HCC): ICD-10-CM

## 2022-11-29 DIAGNOSIS — E11.9 TYPE 2 DIABETES MELLITUS WITHOUT COMPLICATION, WITH LONG-TERM CURRENT USE OF INSULIN (HCC): ICD-10-CM

## 2022-11-29 DIAGNOSIS — I35.8 AORTIC VALVE SCLEROSIS: ICD-10-CM

## 2022-11-29 DIAGNOSIS — I10 PRIMARY HYPERTENSION: Primary | ICD-10-CM

## 2022-11-29 PROBLEM — E11.22 TYPE 2 DIABETES MELLITUS WITH CHRONIC KIDNEY DISEASE (HCC): Status: ACTIVE | Noted: 2022-11-29

## 2022-11-29 PROBLEM — E11.65 TYPE 2 DIABETES MELLITUS WITH HYPERGLYCEMIA (HCC): Status: ACTIVE | Noted: 2022-11-29

## 2022-11-29 PROCEDURE — 3078F DIAST BP <80 MM HG: CPT | Performed by: INTERNAL MEDICINE

## 2022-11-29 PROCEDURE — G8484 FLU IMMUNIZE NO ADMIN: HCPCS | Performed by: INTERNAL MEDICINE

## 2022-11-29 PROCEDURE — 3052F HG A1C>EQUAL 8.0%<EQUAL 9.0%: CPT | Performed by: INTERNAL MEDICINE

## 2022-11-29 PROCEDURE — 1123F ACP DISCUSS/DSCN MKR DOCD: CPT | Performed by: INTERNAL MEDICINE

## 2022-11-29 PROCEDURE — 2022F DILAT RTA XM EVC RTNOPTHY: CPT | Performed by: INTERNAL MEDICINE

## 2022-11-29 PROCEDURE — 1036F TOBACCO NON-USER: CPT | Performed by: INTERNAL MEDICINE

## 2022-11-29 PROCEDURE — 3017F COLORECTAL CA SCREEN DOC REV: CPT | Performed by: INTERNAL MEDICINE

## 2022-11-29 PROCEDURE — 3074F SYST BP LT 130 MM HG: CPT | Performed by: INTERNAL MEDICINE

## 2022-11-29 PROCEDURE — G8417 CALC BMI ABV UP PARAM F/U: HCPCS | Performed by: INTERNAL MEDICINE

## 2022-11-29 PROCEDURE — 99214 OFFICE O/P EST MOD 30 MIN: CPT | Performed by: INTERNAL MEDICINE

## 2022-11-29 PROCEDURE — G8427 DOCREV CUR MEDS BY ELIG CLIN: HCPCS | Performed by: INTERNAL MEDICINE

## 2022-11-29 RX ORDER — EZETIMIBE 10 MG/1
10 TABLET ORAL DAILY
Qty: 30 TABLET | Refills: 3 | Status: SHIPPED | OUTPATIENT
Start: 2022-11-29

## 2022-11-29 ASSESSMENT — ENCOUNTER SYMPTOMS: SHORTNESS OF BREATH: 0

## 2022-11-29 NOTE — PROGRESS NOTES
[address], [address], [address] [place] [address]  PHONE: [phone]    Cindy Walter  1950      SUBJECTIVE:   Thiago Heath is a 71 y.o. male seen for a follow up visit regarding the following:     Chief Complaint   Patient presents with    Coronary Artery Disease       HPI:    80-year-old male comes back for follow-up stent history of some cortices prior stenting he had a stress echo in the past year and had no ischemia but had significant hypertensive response. He is on multiple different meds he says his blood pressure is doing better we checked a renal artery ultrasound scan which did not show any significant evidence of renal artery stenosis. He has sugars going up a little bit A1c is over 8 6 now and his triglycerides were up and drug cholesterol is back up some. He has had no chest pain. He says he can start dieting here to lose weight      Past Medical History, Past Surgical History, Family history, Social History, and Medications were all reviewed with the patient today and updated as necessary.        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    GERD (gastroesophageal reflux disease)     Hyperlipidemia     Hypertension     essential    Mild aortic valve sclerosis 12/15/2020    Obstructive sleep apnea on CPAP     Squamous cell carcinoma of head and neck ([facility] [address] 06/2020    Right Hairline    Type 2 diabetes mellitus with hyperglycemia ([facility] [address] 10/28/2015     Past Surgical History:   Procedure Laterality Date    COLONOSCOPY N/A 10/16/2017    COLONOSCOPY performed by Savanna Castano MD at [address] [place] [address],[address]  01/12/2021    PCI X 2 and balloon    TONSILLECTOMY      senior in high school    UPPER GASTROINTESTINAL ENDOSCOPY  06/19/2009    Hiatal Hernia     Family History   Problem Relation Age of Onset    Heart Disease Father 61    No Known Problems Brother     Lung Disease Brother         Smoker      Social History     Tobacco Use    Smoking status: Never    Smokeless tobacco: Never   Substance Use Topics    Alcohol use: Never       ROS:    Review of Systems   Constitutional: Positive for weight gain. Negative for decreased appetite. Cardiovascular:  Negative for chest pain, dyspnea on exertion, irregular heartbeat and leg swelling. Respiratory:  Negative for shortness of breath. Hematologic/Lymphatic: Negative for bleeding problem. PHYSICAL EXAM:    /76   Pulse 64   Ht 5' 6\" (1.676 m)   Wt 205 lb (93 kg)   BMI 33.09 kg/m²        Wt Readings from Last 3 Encounters:   11/29/22 205 lb (93 kg)   08/30/22 203 lb (92.1 kg)   08/29/22 204 lb 9.6 oz (92.8 kg)     BP Readings from Last 3 Encounters:   11/29/22 118/76   08/30/22 132/70   08/29/22 (!) 156/80         Physical Exam  Constitutional:       General: He is not in acute distress. Cardiovascular:      Rate and Rhythm: Normal rate and regular rhythm. Heart sounds:     No gallop. Pulmonary:      Effort: Pulmonary effort is normal.      Breath sounds: Normal breath sounds. No rales. Musculoskeletal:         General: No swelling. Neurological:      Mental Status: He is alert. Medical problems and test results were reviewed with the patient today. No results found for any visits on 11/29/22.   Lab Results   Component Value Date/Time     08/22/2022 04:34 PM    K 4.4 08/22/2022 04:34 PM     08/22/2022 04:34 PM    CO2 25 08/22/2022 04:34 PM    BUN 26 08/22/2022 04:34 PM    GFRAA >60 08/22/2022 04:34 PM     Lab Results   Component Value Date/Time    CHOL 181 08/22/2022 04:34 PM    HDL 40 08/22/2022 04:34 PM         ASSESSMENT and PLAN    Marcin Berkowitz was seen today for coronary artery disease. Diagnoses and all orders for this visit:    Primary hypertension looks better today. Is no suggestion of renal artery stenosis by ultrasound he will continue his valsartan 320 combined with chlorthalidone Bystolic and amlodipine    Coronary artery disease of native artery of native heart with stable angina pectoris ([address] he is clinically stable to stress echo with no obvious ischemia we will continue medical treatment    Aortic valve sclerosis I do not hear any major murmurs on today's exam    Type 2 diabetes mellitus without complication, with long-term current use of insulin ([address] diabetes little bit worse is can start diet and he says    S/P angioplasty with stent he is done well post stenting will stay on the plate inhibitor/    Mixed dyslipidemia his LDL cholesterols are gone up a little bit and we will add Zetia to try to get the LDL down also his diabetes is gotten worse with this may make it harder to control. Other orders  -     ezetimibe (ZETIA) 10 MG tablet; Take 1 tablet by mouth daily      [unfilled]      No follow-up provider specified.     Shayla Zambrano MD  11/29/2022  9:08 AM

## 2022-12-05 ENCOUNTER — TELEPHONE (OUTPATIENT)
Dept: CARDIOLOGY CLINIC | Age: 72
End: 2022-12-05

## 2022-12-05 RX ORDER — AMLODIPINE BESYLATE 10 MG/1
5 TABLET ORAL DAILY
Qty: 15 TABLET | Refills: 11
Start: 2022-12-05

## 2022-12-05 NOTE — TELEPHONE ENCOUNTER
Per Dr. Ida Hummel, advised patient to decrease amlodipine 10 mg to 1/2 tab qd. Advised patient that  BP may take about 5 days to stabilize after he decreases amlodipine dose. Advised patient to call with any further questions or concerns.    Requested Prescriptions     Signed Prescriptions Disp Refills    amLODIPine (NORVASC) 10 MG tablet 15 tablet 11     Sig: Take 0.5 tablets by mouth daily     Authorizing Provider: GATO Gasca     Ordering User: Taft Najjar

## 2022-12-05 NOTE — TELEPHONE ENCOUNTER
Has lost about 6 lbs and glucose has improved from 200 to 130, since starting [place] on 12/1/22. Since starting [place] diet, BP-132/75, 139/75, 126/69, 124/65. Today, BP-108/62, HR-59. Concerned that BP may get too low. Taking valsartan 320 mg qd, chlorthalidone 25 mg qd, Bystolic 10 mg qd, and amlodipine 10 mg qd. Patient asks if he may decrease BP med doses or D/C some BP meds.

## 2022-12-05 NOTE — TELEPHONE ENCOUNTER
----- Message from Galindo Chi MA sent at 12/5/2022 11:51 AM EST -----  Regarding: FW: BP Meds    ----- Message -----  From: Lenka Walter  Sent: 12/5/2022  10:51 AM EST  To: [facility] Clinical Staff  Subject: BP Meds                                          Started on my diet and bloodpressure is going down  last one was 108/62/59  Should I discontinue some of my meds

## 2022-12-09 NOTE — PROGRESS NOTES
[address] [place] [address] Court, [address] [facility]  [phone]    Patient Name:  Thiago Heath  YOB: 1950      Office Visit 12/13/2022    CHIEF COMPLAINT:      Chief Complaint   Patient presents with    Follow-up    Sleep Apnea    CPAP/BiPAP         HISTORY OF PRESENT ILLNESS:        The patient is seen today in follow-up of obstructive sleep apnea. He is currently on CPAP at 11 cmH2O using a DreamWear nasal mask. Follow-up overnight oximetry on CPAP revealed resolution  of the hypoxemia. He had recently had an echocardiogram revealing some right ventricular enlargement. The  patient underwent a cardiac catheterization with PCI. Right-sided pressures were mildly elevated. His LVEDP was 15 mmHg and his left ventricular ejection fraction was 60%. More recently, the patient had a stress  echo which revealed preserved LV function without any evidence for ischemia. There was no mention of any right sided enlargement of the ventricle or atria. The patient reports that he is compliant with his CPAP on a nightly basis. He is using and benefiting from his CPAP on a regular basis and his AHI is 3/hour. His download indicated he had an average use of 6 hours and 47 minutes. His machine was on the recall list and never gets replaced. At this point we will order replacement machine and use ResMed type and request another mask fitting for him. He agreed with that. He is followed by cardiology as well on a regular basis       The patient's [place] score today is normal at 4/24.     Sleep Medicine 12/13/2022 3/4/2022   Sitting and reading 0 1   Watching TV 1 1   Sitting, inactive in a public place (e.g. a theatre or a meeting) 0 0   As a passenger in a car for an hour without a break 1 0   Lying down to rest in the afternoon when circumstances permit 2 2   Sitting and talking to someone 0 0   Sitting quietly after a lunch without alcohol 0 1   In a car, while stopped for a few minutes in traffic 0 0   [place] Sleepiness Score 4 5           Past Medical History:   Diagnosis Date    Acquired hypothyroidism 6/13/2016    CAD (coronary artery disease) 01/12/2021    PCI X 2 and balloon    Calcification of coronary artery 2021    Carotid artery stenosis 06/24/2021    Mild Bilateral per US    Carpal tunnel syndrome of left wrist 5/13/2019    COVID-19 03/17/2021    Diverticulosis     Entrapment of left ulnar nerve 5/13/2019    GERD (gastroesophageal reflux disease)     Hyperlipidemia     Hypertension     essential    Mild aortic valve sclerosis 12/15/2020    Obstructive sleep apnea on CPAP     Squamous cell carcinoma of head and neck ([address] 06/2020    Right Hairline    Type 2 diabetes mellitus with hyperglycemia ([address] 10/28/2015         Patient Active Problem List   Diagnosis    S/P angioplasty with stent    Type 2 diabetes mellitus, with long-term current use of insulin (HCC)    Hypertension    ZAMUDIO (dyspnea on exertion)    Mixed dyslipidemia    Entrapment of left ulnar nerve    Coronary artery disease of native artery of native heart with stable angina pectoris (HCC)    Aortic valve sclerosis    Gastroesophageal reflux disease    Right heart enlargement    AMBER (obstructive sleep apnea)    Carpal tunnel syndrome of left wrist    Acquired hypothyroidism    Obesity (BMI 30-39. 9)    Chronic renal disease, stage III ([address] [579992]    Pulmonary hypertension, unspecified ([address]    Type 2 diabetes mellitus with hyperglycemia    Type 2 diabetes mellitus with chronic kidney disease          Past Surgical History:   Procedure Laterality Date    COLONOSCOPY N/A 10/16/2017    COLONOSCOPY performed by Savanna Castano MD at [address] [place] [address],[address]  01/12/2021    PCI X 2 and balloon    TONSILLECTOMY      senior in high school    UPPER GASTROINTESTINAL ENDOSCOPY  06/19/2009    Hiatal Hernia       [unfilled]        Social History     Socioeconomic History    Marital status:      Spouse name: Not on file    Number of children: Not on file    Years of education: Not on file    Highest education level: Not on file   Occupational History    Not on file   Tobacco Use    Smoking status: Never    Smokeless tobacco: Never   Vaping Use    Vaping Use: Never used   Substance and Sexual Activity    Alcohol use: Never    Drug use: No    Sexual activity: Not on file   Other Topics Concern    Not on file   Social History Narrative    Not on file     Social Determinants of Health     Financial Resource Strain: Not on file   Food Insecurity: Not on file   Transportation Needs: Not on file   Physical Activity: Not on file   Stress: Not on file   Social Connections: Not on file   Intimate Partner Violence: Not on file   Housing Stability: Not on file         Family History   Problem Relation Age of Onset    Heart Disease Father 61    No Known Problems Brother     Lung Disease Brother         Smoker         Allergies   Allergen Reactions    Empagliflozin Other (See Comments)     Shoulder Pain, Lower Abdominal Pain, Muscle Weakness    Insulin Aspart Swelling         Current Outpatient Medications   Medication Sig    amLODIPine (NORVASC) 10 MG tablet Take 0.5 tablets by mouth daily    ezetimibe (ZETIA) 10 MG tablet Take 1 tablet by mouth daily    omega-3 acid ethyl esters (LOVAZA) 1 g capsule Take 2 capsules by mouth 2 times daily    levothyroxine (SYNTHROID) 112 MCG tablet Take 0.5 tablets by mouth every morning (before breakfast)    rosuvastatin (CRESTOR) 40 MG tablet Take 1 tablet by mouth daily    omeprazole (PRILOSEC) 40 MG delayed release capsule Take 1 capsule by mouth daily (before lunch)    valsartan (DIOVAN) 320 MG tablet Take 1 tablet by mouth daily    fenofibrate micronized (LOFIBRA) 134 MG capsule Take 1 capsule by mouth nightly    empagliflozin (JARDIANCE) 25 MG tablet Take 1 tablet by mouth daily    chlorthalidone (HYGROTON) 25 MG tablet Take 1 tablet by mouth daily nebivolol (BYSTOLIC) 10 MG tablet Take 1 tablet by mouth daily    aspirin 81 MG EC tablet Take by mouth daily    Insulin Aspart, w/Niacinamide, (FIASP FLEXTOUCH) 100 UNIT/ML SOPN Inject 10 units tid before each meal plus sliding scale as needed based on pre-meal glucose. Max daily dose 50 units. Insulin Degludec (TRESIBA FLEXTOUCH) 200 UNIT/ML SOPN Inject 92 Units into the skin every morning     No current facility-administered medications for this visit. REVIEW OF SYSTEMS:   CONSTITUTIONAL:   There is no history of fever, chills, night sweats, weight loss, weight gain, persistent fatigue, or lethargy/hypersomnolence. CARDIAC:   No chest pain, pressure, discomfort, palpitations, orthopnea, murmurs, or edema. GI:   No dysphagia, heartburn reflux, nausea/vomiting, diarrhea, abdominal pain, or bleeding. NEURO:   There is no history of AMS, persistent headache, decreased level of consciousness, seizures, or motor or sensory deficits. PHYSICAL EXAM:    Vitals:    12/13/22 0946   BP: 120/60   Pulse: 52   Resp: 14   Temp: 97.2 °F (36.2 °C)   SpO2: 95%        GENERAL APPEARANCE:   The patient is normal weight and in no respiratory distress. HEENT:   PERRL. Conjunctivae unremarkable. Nasal mucosa is without epistaxis, exudate, or polyps. Gums and dentition are unremarkable. There is moderately severe oropharyngeal narrowing. TMs are clear. NECK/LYMPHATIC:   Symmetrical with no elevation of jugular venous pulsation. Trachea midline. No thyroid enlargement. No cervical adenopathy. LUNGS:   Normal respiratory effort with symmetrical lung expansion. Breath sounds are diminished in the bases. HEART:   There is a regular rate and rhythm. No murmur, rub, or gallop. There is no edema in the lower extremities. ABDOMEN:   Soft and non-tender. No hepatosplenomegaly. Bowel sounds are normal.     NEURO:   The patient is alert and oriented to person, place, and time.   Memory appears intact and mood is normal.  No gross sensorimotor deficits are present. ASSESSMENT:  (Medical Decision Making)      Diagnosis Orders   1. AMBER (obstructive sleep apnea), on CPAP at 11 cm with excellent compliance the patient machine is is on recall list and will need to be replaced. We will request that to be done using ResMed machine at this point. 2. Right heart enlargement , He underwent evaluation for this by cardiology. He did have coronary disease and stent placed. His most recent stress echo did not mention any right  ventricular or atrial enlargement            PLAN:    Replacement machine with a pressure of 11 cm will be ordered    Continue proper sleep hygiene and positional therapy    Renew his mask and supplies order    Follow-up in the sleep center in 4 months or sooner if needed    Maintain his follow-up with a primary care provider      Orders Placed This Encounter   Procedures    [facility] - [address] PULMONARY AND CRITICAL CARE  Phone: [phone] [employer] [address] [address]  Dept: [phone]      Patient Name: Abdirashid Sanchez  : 1950  Gender: male  Address: [place] [address]  Patient phone number: [phone] (home)       Primary Insurance: Payor: MEDICARE / Plan: MEDICARE PART A AND B / Product Type: *No Product type* /   Subscriber ID: [id number] - (Medicare)      AMB Supply Order  Order Details     DME Location:   Order Date: 2022   The primary encounter diagnosis was AMBER (obstructive sleep apnea). A diagnosis of Right heart enlargement was also pertinent to this visit.           (  X   )New Set-Up    ResMed machine   (   x  ) CPAP Unit  (     ) Auto CPAP Unit  (     ) BiLevel Unit  (     ) Auto BiLevel Unit  (     ) ASV         Length of need: 12 months    Pressure: 11  cmH20  EPR: 1  Ramp Time:    min    Starting Ramp Pressure:     cm H20    Patient had a diagnostic Apnea Hypopnea Index (AHI) of :       *SUPPLIES* Replace all as needed, or per coverage guidelines     Masks Type: Consider P 30 I or N 30 I mask or equivalent    (   ) -Full Face Mask (1 per 3 mon)  (    ) -Full Mask (1 per month) Interface/Cushion      (    ) -Nasal Mask (1 per 3 mon)  (    ) - Nasal Mask (1 per month) Interface/Cushion  (    ) -Pillow (2 per mon)  (    ) -Zukzefolj (1 per 6 mon)      _________________________________________________________________          Other Supplies:    (  X   )-Hgcabpcl (1 per 6 mon)  ( X    )-Zpxutp Tubing (1 per 3 mon)  (  X   )- Disposable Filter (2 per mon)  (   X  )-Aigugt Humidifier (1 per year)     (     )-Vdcnkietl (sometimes used with Full Face Mask) (1 per 6 mos)  (     )-Tubing-without heat (1 per 3 mos)  (     )-Non-Disposable Filter (1 per 6 mos)  (     )-Water Chamber (1 per 6 mos)  (     )-Humidifier non-heated (1 per 5 yrs)      Signed Date: 12/13/2022  Electronically Signed By: Yuliana Mathew MD        No orders of the defined types were placed in this encounter. Over 50% of today's office visit was spent in face to face time reviewing test results, prognosis, importance of compliance, education about disease process, benefits of medications, instructions for management of acute flare-ups, and follow up plans. Total face to face time spent with patient including charting was 30 minutes.         Yuliana Mathew MD  Electronically signed

## 2022-12-13 ENCOUNTER — OFFICE VISIT (OUTPATIENT)
Dept: SLEEP MEDICINE | Age: 72
End: 2022-12-13
Payer: MEDICARE

## 2022-12-13 VITALS
BODY MASS INDEX: 31.98 KG/M2 | OXYGEN SATURATION: 95 % | WEIGHT: 199 LBS | HEIGHT: 66 IN | TEMPERATURE: 97.2 F | SYSTOLIC BLOOD PRESSURE: 120 MMHG | HEART RATE: 52 BPM | RESPIRATION RATE: 14 BRPM | DIASTOLIC BLOOD PRESSURE: 60 MMHG

## 2022-12-13 DIAGNOSIS — G47.33 OSA (OBSTRUCTIVE SLEEP APNEA): Primary | ICD-10-CM

## 2022-12-13 DIAGNOSIS — I51.7 RIGHT HEART ENLARGEMENT: ICD-10-CM

## 2022-12-13 PROCEDURE — 1036F TOBACCO NON-USER: CPT | Performed by: INTERNAL MEDICINE

## 2022-12-13 PROCEDURE — 3078F DIAST BP <80 MM HG: CPT | Performed by: INTERNAL MEDICINE

## 2022-12-13 PROCEDURE — 3074F SYST BP LT 130 MM HG: CPT | Performed by: INTERNAL MEDICINE

## 2022-12-13 PROCEDURE — G8484 FLU IMMUNIZE NO ADMIN: HCPCS | Performed by: INTERNAL MEDICINE

## 2022-12-13 PROCEDURE — G8427 DOCREV CUR MEDS BY ELIG CLIN: HCPCS | Performed by: INTERNAL MEDICINE

## 2022-12-13 PROCEDURE — 3017F COLORECTAL CA SCREEN DOC REV: CPT | Performed by: INTERNAL MEDICINE

## 2022-12-13 PROCEDURE — G8417 CALC BMI ABV UP PARAM F/U: HCPCS | Performed by: INTERNAL MEDICINE

## 2022-12-13 PROCEDURE — 1123F ACP DISCUSS/DSCN MKR DOCD: CPT | Performed by: INTERNAL MEDICINE

## 2022-12-13 PROCEDURE — 99214 OFFICE O/P EST MOD 30 MIN: CPT | Performed by: INTERNAL MEDICINE

## 2022-12-13 ASSESSMENT — SLEEP AND FATIGUE QUESTIONNAIRES
HOW LIKELY ARE YOU TO NOD OFF OR FALL ASLEEP IN A CAR, WHILE STOPPED FOR A FEW MINUTES IN TRAFFIC: 0
HOW LIKELY ARE YOU TO NOD OFF OR FALL ASLEEP WHILE SITTING INACTIVE IN A PUBLIC PLACE: 0
HOW LIKELY ARE YOU TO NOD OFF OR FALL ASLEEP WHILE SITTING AND READING: 0
HOW LIKELY ARE YOU TO NOD OFF OR FALL ASLEEP WHILE SITTING QUIETLY AFTER LUNCH WITHOUT ALCOHOL: 0
HOW LIKELY ARE YOU TO NOD OFF OR FALL ASLEEP WHILE WATCHING TV: 1
HOW LIKELY ARE YOU TO NOD OFF OR FALL ASLEEP WHILE SITTING AND TALKING TO SOMEONE: 0
HOW LIKELY ARE YOU TO NOD OFF OR FALL ASLEEP WHILE LYING DOWN TO REST IN THE AFTERNOON WHEN CIRCUMSTANCES PERMIT: 2
ESS TOTAL SCORE: 4
HOW LIKELY ARE YOU TO NOD OFF OR FALL ASLEEP WHEN YOU ARE A PASSENGER IN A CAR FOR AN HOUR WITHOUT A BREAK: 1

## 2022-12-13 NOTE — PATIENT INSTRUCTIONS
The company who will be taking care of your  supplies is:    [employer]    Address: [address] [place] [address]. [address], [address] [place] [address]  Phone: [phone]  Fax: [phone]

## 2022-12-22 ENCOUNTER — TELEPHONE (OUTPATIENT)
Dept: SLEEP MEDICINE | Age: 72
End: 2022-12-22

## 2022-12-22 NOTE — TELEPHONE ENCOUNTER
Patient says that he does not want the jerez cpap back . That they do want the Resmed . They have not heard from the [employer] company so I gave them the number.  Ask that  someone call them about the [facility] cpap

## 2022-12-23 NOTE — TELEPHONE ENCOUNTER
Sent message to asael regarding pt not wanting the dreamstation.  Pt would like to get a resmed machine

## 2023-02-16 DIAGNOSIS — I25.118 CORONARY ARTERY DISEASE OF NATIVE ARTERY OF NATIVE HEART WITH STABLE ANGINA PECTORIS (HCC): ICD-10-CM

## 2023-02-16 DIAGNOSIS — E78.2 MIXED HYPERLIPIDEMIA: ICD-10-CM

## 2023-02-16 DIAGNOSIS — E53.8 VITAMIN B12 DEFICIENCY: ICD-10-CM

## 2023-02-16 DIAGNOSIS — E03.9 ACQUIRED HYPOTHYROIDISM: ICD-10-CM

## 2023-02-16 DIAGNOSIS — E55.9 VITAMIN D DEFICIENCY: ICD-10-CM

## 2023-02-16 DIAGNOSIS — I10 ESSENTIAL HYPERTENSION: ICD-10-CM

## 2023-02-16 DIAGNOSIS — E11.51 TYPE 2 DIABETES MELLITUS WITH PERIPHERAL CIRCULATORY DISORDER (HCC): ICD-10-CM

## 2023-02-16 LAB
25(OH)D3 SERPL-MCNC: 27.7 NG/ML (ref 30–100)
ALBUMIN SERPL-MCNC: 3.6 G/DL (ref 3.2–4.6)
ALBUMIN/GLOB SERPL: 1.1 (ref 0.4–1.6)
ALP SERPL-CCNC: 28 U/L (ref 50–136)
ALT SERPL-CCNC: 66 U/L (ref 12–65)
ANION GAP SERPL CALC-SCNC: 3 MMOL/L (ref 2–11)
AST SERPL-CCNC: 57 U/L (ref 15–37)
BASOPHILS # BLD: 0 K/UL (ref 0–0.2)
BASOPHILS NFR BLD: 1 % (ref 0–2)
BILIRUB SERPL-MCNC: 0.4 MG/DL (ref 0.2–1.1)
BUN SERPL-MCNC: 37 MG/DL (ref 8–23)
CALCIUM SERPL-MCNC: 9.3 MG/DL (ref 8.3–10.4)
CHLORIDE SERPL-SCNC: 106 MMOL/L (ref 101–110)
CHOLEST SERPL-MCNC: 95 MG/DL
CO2 SERPL-SCNC: 30 MMOL/L (ref 21–32)
CREAT SERPL-MCNC: 1.5 MG/DL (ref 0.8–1.5)
DIFFERENTIAL METHOD BLD: NORMAL
EOSINOPHIL # BLD: 0.1 K/UL (ref 0–0.8)
EOSINOPHIL NFR BLD: 2 % (ref 0.5–7.8)
ERYTHROCYTE [DISTWIDTH] IN BLOOD BY AUTOMATED COUNT: 12.6 % (ref 11.9–14.6)
EST. AVERAGE GLUCOSE BLD GHB EST-MCNC: 166 MG/DL
GLOBULIN SER CALC-MCNC: 3.3 G/DL (ref 2.8–4.5)
GLUCOSE SERPL-MCNC: 75 MG/DL (ref 65–100)
HBA1C MFR BLD: 7.4 % (ref 4.8–5.6)
HCT VFR BLD AUTO: 43.3 % (ref 41.1–50.3)
HDLC SERPL-MCNC: 35 MG/DL (ref 40–60)
HDLC SERPL: 2.7
HGB BLD-MCNC: 13.9 G/DL (ref 13.6–17.2)
IMM GRANULOCYTES # BLD AUTO: 0 K/UL (ref 0–0.5)
IMM GRANULOCYTES NFR BLD AUTO: 0 % (ref 0–5)
LDLC SERPL CALC-MCNC: 37.4 MG/DL
LYMPHOCYTES # BLD: 2.4 K/UL (ref 0.5–4.6)
LYMPHOCYTES NFR BLD: 43 % (ref 13–44)
MCH RBC QN AUTO: 29.6 PG (ref 26.1–32.9)
MCHC RBC AUTO-ENTMCNC: 32.1 G/DL (ref 31.4–35)
MCV RBC AUTO: 92.1 FL (ref 82–102)
MONOCYTES # BLD: 0.5 K/UL (ref 0.1–1.3)
MONOCYTES NFR BLD: 10 % (ref 4–12)
NEUTS SEG # BLD: 2.5 K/UL (ref 1.7–8.2)
NEUTS SEG NFR BLD: 44 % (ref 43–78)
NRBC # BLD: 0 K/UL (ref 0–0.2)
PLATELET # BLD AUTO: 177 K/UL (ref 150–450)
PMV BLD AUTO: 11.8 FL (ref 9.4–12.3)
POTASSIUM SERPL-SCNC: 5 MMOL/L (ref 3.5–5.1)
PROT SERPL-MCNC: 6.9 G/DL (ref 6.3–8.2)
RBC # BLD AUTO: 4.7 M/UL (ref 4.23–5.6)
SODIUM SERPL-SCNC: 139 MMOL/L (ref 133–143)
TRIGL SERPL-MCNC: 113 MG/DL (ref 35–150)
TSH, 3RD GENERATION: 1.58 UIU/ML (ref 0.36–3.74)
VIT B12 SERPL-MCNC: 999 PG/ML (ref 193–986)
VLDLC SERPL CALC-MCNC: 22.6 MG/DL (ref 6–23)
WBC # BLD AUTO: 5.6 K/UL (ref 4.3–11.1)

## 2023-03-02 ENCOUNTER — OFFICE VISIT (OUTPATIENT)
Dept: INTERNAL MEDICINE CLINIC | Facility: CLINIC | Age: 73
End: 2023-03-02
Payer: MEDICARE

## 2023-03-02 VITALS
OXYGEN SATURATION: 97 % | BODY MASS INDEX: 28.45 KG/M2 | HEART RATE: 58 BPM | DIASTOLIC BLOOD PRESSURE: 60 MMHG | HEIGHT: 66 IN | SYSTOLIC BLOOD PRESSURE: 120 MMHG | TEMPERATURE: 97.8 F | WEIGHT: 177 LBS

## 2023-03-02 DIAGNOSIS — E78.2 MIXED HYPERLIPIDEMIA: ICD-10-CM

## 2023-03-02 DIAGNOSIS — E55.9 VITAMIN D DEFICIENCY: ICD-10-CM

## 2023-03-02 DIAGNOSIS — R74.8 ELEVATED LIVER ENZYMES: ICD-10-CM

## 2023-03-02 DIAGNOSIS — N18.31 STAGE 3A CHRONIC KIDNEY DISEASE (HCC): ICD-10-CM

## 2023-03-02 DIAGNOSIS — E03.9 ACQUIRED HYPOTHYROIDISM: ICD-10-CM

## 2023-03-02 DIAGNOSIS — E11.51 TYPE 2 DIABETES MELLITUS WITH PERIPHERAL CIRCULATORY DISORDER (HCC): Primary | ICD-10-CM

## 2023-03-02 DIAGNOSIS — I10 ESSENTIAL HYPERTENSION: ICD-10-CM

## 2023-03-02 DIAGNOSIS — I25.118 CORONARY ARTERY DISEASE OF NATIVE ARTERY OF NATIVE HEART WITH STABLE ANGINA PECTORIS (HCC): ICD-10-CM

## 2023-03-02 DIAGNOSIS — E53.8 VITAMIN B12 DEFICIENCY: ICD-10-CM

## 2023-03-02 PROCEDURE — G8427 DOCREV CUR MEDS BY ELIG CLIN: HCPCS | Performed by: PHYSICIAN ASSISTANT

## 2023-03-02 PROCEDURE — G8417 CALC BMI ABV UP PARAM F/U: HCPCS | Performed by: PHYSICIAN ASSISTANT

## 2023-03-02 PROCEDURE — 1123F ACP DISCUSS/DSCN MKR DOCD: CPT | Performed by: PHYSICIAN ASSISTANT

## 2023-03-02 PROCEDURE — 2022F DILAT RTA XM EVC RTNOPTHY: CPT | Performed by: PHYSICIAN ASSISTANT

## 2023-03-02 PROCEDURE — 3078F DIAST BP <80 MM HG: CPT | Performed by: PHYSICIAN ASSISTANT

## 2023-03-02 PROCEDURE — 3051F HG A1C>EQUAL 7.0%<8.0%: CPT | Performed by: PHYSICIAN ASSISTANT

## 2023-03-02 PROCEDURE — 3017F COLORECTAL CA SCREEN DOC REV: CPT | Performed by: PHYSICIAN ASSISTANT

## 2023-03-02 PROCEDURE — 3074F SYST BP LT 130 MM HG: CPT | Performed by: PHYSICIAN ASSISTANT

## 2023-03-02 PROCEDURE — 1036F TOBACCO NON-USER: CPT | Performed by: PHYSICIAN ASSISTANT

## 2023-03-02 PROCEDURE — G8484 FLU IMMUNIZE NO ADMIN: HCPCS | Performed by: PHYSICIAN ASSISTANT

## 2023-03-02 PROCEDURE — 99215 OFFICE O/P EST HI 40 MIN: CPT | Performed by: PHYSICIAN ASSISTANT

## 2023-03-02 RX ORDER — OMEGA-3-ACID ETHYL ESTERS 1 G/1
2 CAPSULE, LIQUID FILLED ORAL 2 TIMES DAILY
Qty: 360 CAPSULE | Refills: 3 | Status: SHIPPED | OUTPATIENT
Start: 2023-03-02

## 2023-03-02 RX ORDER — NEBIVOLOL 10 MG/1
10 TABLET ORAL DAILY
Qty: 90 TABLET | Refills: 3 | Status: SHIPPED | OUTPATIENT
Start: 2023-03-02

## 2023-03-02 SDOH — ECONOMIC STABILITY: FOOD INSECURITY: WITHIN THE PAST 12 MONTHS, THE FOOD YOU BOUGHT JUST DIDN'T LAST AND YOU DIDN'T HAVE MONEY TO GET MORE.: PATIENT DECLINED

## 2023-03-02 SDOH — ECONOMIC STABILITY: HOUSING INSECURITY
IN THE LAST 12 MONTHS, WAS THERE A TIME WHEN YOU DID NOT HAVE A STEADY PLACE TO SLEEP OR SLEPT IN A SHELTER (INCLUDING NOW)?: PATIENT REFUSED

## 2023-03-02 SDOH — ECONOMIC STABILITY: FOOD INSECURITY: WITHIN THE PAST 12 MONTHS, YOU WORRIED THAT YOUR FOOD WOULD RUN OUT BEFORE YOU GOT MONEY TO BUY MORE.: PATIENT DECLINED

## 2023-03-02 SDOH — ECONOMIC STABILITY: INCOME INSECURITY: HOW HARD IS IT FOR YOU TO PAY FOR THE VERY BASICS LIKE FOOD, HOUSING, MEDICAL CARE, AND HEATING?: PATIENT DECLINED

## 2023-03-02 ASSESSMENT — PATIENT HEALTH QUESTIONNAIRE - PHQ9
SUM OF ALL RESPONSES TO PHQ QUESTIONS 1-9: 0
SUM OF ALL RESPONSES TO PHQ QUESTIONS 1-9: 0
1. LITTLE INTEREST OR PLEASURE IN DOING THINGS: 0
2. FEELING DOWN, DEPRESSED OR HOPELESS: 0
SUM OF ALL RESPONSES TO PHQ QUESTIONS 1-9: 0
SUM OF ALL RESPONSES TO PHQ QUESTIONS 1-9: 0
SUM OF ALL RESPONSES TO PHQ9 QUESTIONS 1 & 2: 0

## 2023-03-02 ASSESSMENT — ENCOUNTER SYMPTOMS
SHORTNESS OF BREATH: 0
CONSTIPATION: 0

## 2023-03-02 NOTE — PROGRESS NOTES
Inge Garza (:  1950) is a 71 y.o. male,Established patient, here for evaluation of the following chief complaint(s):  Follow-up (Diabetes, Hypertension, Hyperlipidemia, Hypothyroidism, Vit D Def, Vit B12 Def) and Discuss Labs         ASSESSMENT/PLAN:  1. Type 2 diabetes mellitus with peripheral circulatory disorder (HCC)  -     empagliflozin (JARDIANCE) 25 MG tablet; Take 1 tablet by mouth daily, Disp-30 tablet, R-5Normal  -     HM DIABETES FOOT EXAM  -     Comprehensive Metabolic Panel; Future  -     Hemoglobin A1C; Future  -     Microalbumin / Creatinine Urine Ratio; Future  2. Coronary artery disease of native artery of native heart with stable angina pectoris ([facility]  -     Lipid Panel; Future  3. Stage 3a chronic kidney disease ([facility]  -     Comprehensive Metabolic Panel; Future  4. Mixed hyperlipidemia  -     omega-3 acid ethyl esters (LOVAZA) 1 g capsule; Take 2 capsules by mouth 2 times daily, Disp-360 capsule, R-3Normal  -     Comprehensive Metabolic Panel; Future  -     Lipid Panel; Future  5. Elevated liver enzymes  -     Comprehensive Metabolic Panel; Future  6. Essential hypertension  -     Comprehensive Metabolic Panel; Future  7. Acquired hypothyroidism  -     TSH; Future  8. Vitamin D deficiency  -     Vitamin D 25 Hydroxy; Future  9.  Vitamin B12 deficiency      Patient Instructions   Increase OTC vitamin d3 to 10,000 iu daily (2 x 5000 iu capsules)   Resume vitamin b12 supplement but probably only need every other day  Hold Zetia for now given significant lifestyle changes in hopes that LDL can remain below 70 even off of this  Trial reduction of Tresiba to 60 units daily - notify me if the majority of fasting readings are less than 90 in a 2 week span  Encouraged to find snacks with similar nutritional benefits of high protein and low carb that would not include all the additives that Nell's do to aid in regaining liver health  Challenged that he needs to increase water intake even further given that he is on 2 medications that are or act like diuretics which depletes some of the water he is drinking  Advised to discuss possibility of reducing Chlorthalidone to a lower dosage if cardiology is supportive  Trial discontinuation of Fenofibrate  Monitor blood pressure 3 times/week at minimum and keep record to bring to next appointment  Continue chronic medications as prescribed  Monitor blood sugar 3 times/day and keep record to bring to next appointment  Urged to gradually increase duration of bicycle riding up to 30-45 minutes to help assist gaining further control of diabetes without adjusting medications      Return in about 3 months (around 6/2/2023) for diabetes f/u. Subjective   SUBJECTIVE/OBJECTIVE:  The patient is a 71 y.o. male who is seen for follow up of diabetes. Current monitoring regimen: BGs consistently in an acceptable range. Glucose monitoring frequency: 3 times daily  Home blood sugar records: fasting range: , pre-lunch range: , pre-dinner range:   Any episodes of hypoglycemia? no  Known diabetic complications: cardiovascular disease  Current diabetic medications include: oral agent (monotherapy): Jardiance 12.5 mg (1/2 of 25 mg tablet) daily and insulin injections: Tresiba 70 units q AM .  He has been adjusting [place] dose during weight loss & hasn't used Fiasp since going on diet. Current Eye Exam (within one year): Yes - 2022  Current Urine Microalbumin: Yes, abnormal - April 2022  Is She on ACE inhibitor or angiotensin II receptor blocker? Yes - valsartan (Diovan)  Current Foot Exam (within one year):  No - Nov 2021      Weight trend: decreasing rapidly  Prior visit with dietician: yes - 2016  Current diet: meals per day on average: 1 + 5 [employer] snacks;  restricting intake of the following: sweets & carbohydrates (strictly on potatoes & bread)  Current exercise: bicycling        Last HbA1C:    Lab Results   Component Value Date LABA1C 7.4 (H) 02/16/2023     Lab Results   Component Value Date     02/16/2023       The patient is a 71 y.o. male who is seen for evaluation of hyperlipidemia. He was tested because of mixed hyperlipidemia + CAD + diabetes. The current state of this condition is no significant medication side effects noted and well controlled on Crestor 40 mg q hs + Fenofibrate 134 mg daily + Lovaza 2 grams bid + Zetia 10 mg daily - taking as prescribed. Cardiology recently added Zetia (11/29/22) and he started on a strict diet 12/1/22 that has resulted in 28 pound weight loss thus far. Last Lipid Panel:   Lab Results   Component Value Date    CHOL 95 02/16/2023    CHOL 181 08/22/2022    CHOL 94 04/20/2022     Lab Results   Component Value Date    TRIG 113 02/16/2023    TRIG 258 (H) 08/22/2022    TRIG 108 04/20/2022     Lab Results   Component Value Date    HDL 35 (L) 02/16/2023    HDL 40 08/22/2022    HDL 38 (L) 04/20/2022     Lab Results   Component Value Date    LDLCALC 37.4 02/16/2023    LDLCALC 89.4 08/22/2022    LDLCALC 34.4 04/20/2022     Lab Results   Component Value Date    LABVLDL 22.6 02/16/2023    LABVLDL 51.6 (H) 08/22/2022    LABVLDL 21.6 04/20/2022     Lab Results   Component Value Date    CHOLHDLRATIO 2.7 02/16/2023    CHOLHDLRATIO 4.5 08/22/2022    CHOLHDLRATIO 2.5 04/20/2022       The patient is a 71 y.o. male who is seen for follow-up of hypertension. He is exercising but just began recently and is not directly adherent to low salt diet. Daily caffiene intake: a known amount (2 servings/day). Current medication regimen consists of: Valsartan 320 mg daily + Chlorthalidone 25 mg daily + Bystolic 10 mg daily. Blood pressure is well controlled at home, ranging 100-120's/50-70's. He discontinued Amlodipine 10 mg after blood pressure started dropping with new diet in early December.     BP Readings from Last 3 Encounters:   03/02/23 120/60   12/13/22 120/60   11/29/22 118/76       The patient is a 71 y.o. male who is seen for follow up of hypothyroidism. Current symptoms: weight changes. Symptoms are gradually improving since early December. The patient is following treatment regimen with good compliance. Current treatment regimen consists of Levothyroxine 56 mcg (1/2 of 112 mcg tablet) daily and is  taking it in the evenings ~ 9 pm with other medications. Lab Results   Component Value Date    DXN9BZP 1.580 02/16/2023    EVR7JYD 1.800 08/22/2022    TSH 1.500 04/20/2022    TSH 2.490 01/04/2022    TSH 1.190 09/15/2021     No results found for: T4FREE  No results found for: TGAB      Patient is here for follow-up of vitamin d deficiency. The current state of this condition is improved, no significant medication side effects noted, and suboptimally controlled  on OTC vitamin d3 5000 iu daily - taking as prescribed. Vit D, 25-Hydroxy   Date Value Ref Range Status   02/16/2023 27.7 (L) 30.0 - 100.0 ng/mL Final   08/22/2022 16.1 (L) 30.0 - 100.0 ng/mL Final       Patient is here for follow-up of vitamin b12 deficiency. The current state of this condition is no significant medication side effects noted and well controlled on OTC vitamin b12 supplement daily - not taking currently as prescribed, medication compliance problems: ran out ~ 1 month ago and hasn't bought more.     Lab Results   Component Value Date    WBC 5.6 02/16/2023    WBC 5.7 08/22/2022    WBC 4.9 04/20/2022     Lab Results   Component Value Date    HGB 13.9 02/16/2023    HGB 14.3 08/22/2022    HGB 13.1 (L) 04/20/2022     Lab Results   Component Value Date    HCT 43.3 02/16/2023    HCT 44.1 08/22/2022    HCT 41.5 04/20/2022     Lab Results   Component Value Date    MCV 92.1 02/16/2023    MCV 90.2 08/22/2022    MCV 89.2 04/20/2022     Lab Results   Component Value Date     02/16/2023     08/22/2022     04/20/2022       Lab Results   Component Value Date    IRON 103 04/20/2022     Lab Results   Component Value Date    TIBC 358 04/20/2022     Lab Results   Component Value Date    TRFS 29 04/20/2022     Lab Results   Component Value Date    FERRITIN 105 04/20/2022     Lab Results   Component Value Date    WLCMLOTJ45 999 (H) 02/16/2023    AJSNUJJL16 274 08/22/2022     Lab Results   Component Value Date    FOLATE 12.6 08/22/2022       Review of Systems   Constitutional:  Positive for unexpected weight change (28 pounds lost since December 1). Negative for fatigue. Eyes:  Negative for visual disturbance. Respiratory:  Negative for shortness of breath. Cardiovascular:  Negative for chest pain, palpitations and leg swelling. Gastrointestinal:  Negative for constipation. Endocrine: Negative for cold intolerance, heat intolerance and polydipsia. Negative for hair loss   Musculoskeletal:  Positive for myalgias (occasional feet). Neurological:  Positive for numbness (bilateral 4th & 5th fingers). Negative for dizziness and headaches. /60 (Site: Left Upper Arm, Position: Sitting, Cuff Size: Large Adult)   Pulse 58   Temp 97.8 °F (36.6 °C) (Temporal)   Ht 5' 6\" (1.676 m)   Wt 177 lb (80.3 kg)   SpO2 97%   BMI 28.57 kg/m²       Objective   Physical Exam  Constitutional:       Appearance: Normal appearance. HENT:      Head: Normocephalic and atraumatic. Eyes:      Conjunctiva/sclera: Conjunctivae normal.      Pupils: Pupils are equal, round, and reactive to light. Neck:      Vascular: No carotid bruit. Cardiovascular:      Rate and Rhythm: Normal rate and regular rhythm. Heart sounds: Normal heart sounds. Pulmonary:      Effort: Pulmonary effort is normal.      Breath sounds: Normal breath sounds. Musculoskeletal:         General: Normal range of motion. Cervical back: Normal range of motion. Right lower leg: No edema. Left lower leg: No edema. Skin:     General: Skin is warm and dry. Neurological:      Mental Status: He is alert and oriented to person, place, and time. Comments: Visual inspection:  Deformity/amputation: absent  Skin lesions/pre-ulcerative calluses: present - L great toe  Edema: right- trace, left- trace    Sensory exam:  Monofilament sensation: normal  (minimum of 5 random plantar locations tested, avoiding callused areas - > 1 area with absence of sensation is + for neuropathy)    Plus at least one of the following:  Pulses: Normal  Pinprick: Intact  Proprioception: Intact  Vibration (128 Hz): Not Performed     Psychiatric:         Mood and Affect: Mood normal.         Behavior: Behavior normal.         Thought Content: Thought content normal.         Judgment: Judgment normal.          On this date 3/2/2023 I have spent 55 minutes reviewing previous notes, test results and face to face with the patient discussing the diagnosis and importance of compliance with the treatment plan as well as documenting on the day of the visit. An electronic signature was used to authenticate this note.     --Amaedo Bolden PA-C

## 2023-03-02 NOTE — PATIENT INSTRUCTIONS
Increase OTC vitamin d3 to 10,000 iu daily (2 x 5000 iu capsules)   Resume vitamin b12 supplement but probably only need every other day  Hold Zetia for now given significant lifestyle changes in hopes that LDL can remain below 70 even off of this  Trial reduction of Tresiba to 60 units daily - notify me if the majority of fasting readings are less than 90 in a 2 week span  Encouraged to find snacks with similar nutritional benefits of high protein and low carb that would not include all the additives that Nell's do to aid in regaining liver health  Challenged that he needs to increase water intake even further given that he is on 2 medications that are or act like diuretics which depletes some of the water he is drinking  Advised to discuss possibility of reducing Chlorthalidone to a lower dosage if cardiology is supportive  Trial discontinuation of Fenofibrate  Monitor blood pressure 3 times/week at minimum and keep record to bring to next appointment  Continue chronic medications as prescribed  Monitor blood sugar 3 times/day and keep record to bring to next appointment  Urged to gradually increase duration of bicycle riding up to 30-45 minutes to help assist gaining further control of diabetes without adjusting medications

## 2023-03-28 NOTE — PROGRESS NOTES
(1 per 3 mon)  (   X  )- Disposable Filter (2 per mon)  (   x  )-Xlpnui Humidifier (1 per year)     (  x   )-Uuncaeykl (sometimes used with Full Face Mask) (1 per 6 mos)  (    )-Tubing-without heat (1 per 3 mos)  (     )-Non-Disposable Filter (1 per 6 mos)  (  x   )-Water Chamber (1 per 6 mos)  (     )-Humidifier non-heated (1 per 5 yrs)      Signed Date: 3/29/2023  Electronically Signed By: MINA Chin CNP  Electronically Dated:  3/29/2023           Collaborating Physician: Dr. Debra Mcneal    Over 50% of today's office visit was spent in face to face time reviewing test results, prognosis, importance of compliance, education about disease process, benefits of medications, instructions for management of acute flare-ups, and follow up plans. Total face to face time spent with patient was 20 minutes.         MINA Chin CNP  Electronically signed

## 2023-03-29 ENCOUNTER — OFFICE VISIT (OUTPATIENT)
Dept: SLEEP MEDICINE | Age: 73
End: 2023-03-29
Payer: MEDICARE

## 2023-03-29 VITALS
OXYGEN SATURATION: 96 % | BODY MASS INDEX: 27.37 KG/M2 | HEIGHT: 66 IN | HEART RATE: 68 BPM | TEMPERATURE: 97.8 F | SYSTOLIC BLOOD PRESSURE: 120 MMHG | DIASTOLIC BLOOD PRESSURE: 70 MMHG | WEIGHT: 170.3 LBS

## 2023-03-29 DIAGNOSIS — G47.33 OSA (OBSTRUCTIVE SLEEP APNEA): Primary | ICD-10-CM

## 2023-03-29 PROCEDURE — 1123F ACP DISCUSS/DSCN MKR DOCD: CPT | Performed by: STUDENT IN AN ORGANIZED HEALTH CARE EDUCATION/TRAINING PROGRAM

## 2023-03-29 PROCEDURE — 3017F COLORECTAL CA SCREEN DOC REV: CPT | Performed by: STUDENT IN AN ORGANIZED HEALTH CARE EDUCATION/TRAINING PROGRAM

## 2023-03-29 PROCEDURE — 1036F TOBACCO NON-USER: CPT | Performed by: STUDENT IN AN ORGANIZED HEALTH CARE EDUCATION/TRAINING PROGRAM

## 2023-03-29 PROCEDURE — 99213 OFFICE O/P EST LOW 20 MIN: CPT | Performed by: STUDENT IN AN ORGANIZED HEALTH CARE EDUCATION/TRAINING PROGRAM

## 2023-03-29 PROCEDURE — G8484 FLU IMMUNIZE NO ADMIN: HCPCS | Performed by: STUDENT IN AN ORGANIZED HEALTH CARE EDUCATION/TRAINING PROGRAM

## 2023-03-29 PROCEDURE — G8427 DOCREV CUR MEDS BY ELIG CLIN: HCPCS | Performed by: STUDENT IN AN ORGANIZED HEALTH CARE EDUCATION/TRAINING PROGRAM

## 2023-03-29 PROCEDURE — 3074F SYST BP LT 130 MM HG: CPT | Performed by: STUDENT IN AN ORGANIZED HEALTH CARE EDUCATION/TRAINING PROGRAM

## 2023-03-29 PROCEDURE — G8417 CALC BMI ABV UP PARAM F/U: HCPCS | Performed by: STUDENT IN AN ORGANIZED HEALTH CARE EDUCATION/TRAINING PROGRAM

## 2023-03-29 PROCEDURE — 3078F DIAST BP <80 MM HG: CPT | Performed by: STUDENT IN AN ORGANIZED HEALTH CARE EDUCATION/TRAINING PROGRAM

## 2023-03-29 ASSESSMENT — SLEEP AND FATIGUE QUESTIONNAIRES
HOW LIKELY ARE YOU TO NOD OFF OR FALL ASLEEP WHILE SITTING AND READING: 0
ESS TOTAL SCORE: 2
HOW LIKELY ARE YOU TO NOD OFF OR FALL ASLEEP WHEN YOU ARE A PASSENGER IN A CAR FOR AN HOUR WITHOUT A BREAK: 0
HOW LIKELY ARE YOU TO NOD OFF OR FALL ASLEEP WHILE SITTING QUIETLY AFTER LUNCH WITHOUT ALCOHOL: 0
HOW LIKELY ARE YOU TO NOD OFF OR FALL ASLEEP WHILE LYING DOWN TO REST IN THE AFTERNOON WHEN CIRCUMSTANCES PERMIT: 2
HOW LIKELY ARE YOU TO NOD OFF OR FALL ASLEEP WHILE WATCHING TV: 0
HOW LIKELY ARE YOU TO NOD OFF OR FALL ASLEEP IN A CAR, WHILE STOPPED FOR A FEW MINUTES IN TRAFFIC: 0
HOW LIKELY ARE YOU TO NOD OFF OR FALL ASLEEP WHILE SITTING INACTIVE IN A PUBLIC PLACE: 0
HOW LIKELY ARE YOU TO NOD OFF OR FALL ASLEEP WHILE SITTING AND TALKING TO SOMEONE: 0

## 2023-05-30 ENCOUNTER — OFFICE VISIT (OUTPATIENT)
Age: 73
End: 2023-05-30
Payer: MEDICARE

## 2023-05-30 VITALS
DIASTOLIC BLOOD PRESSURE: 66 MMHG | WEIGHT: 176 LBS | BODY MASS INDEX: 28.28 KG/M2 | SYSTOLIC BLOOD PRESSURE: 112 MMHG | HEIGHT: 66 IN | HEART RATE: 66 BPM

## 2023-05-30 DIAGNOSIS — E78.2 MIXED HYPERLIPIDEMIA: ICD-10-CM

## 2023-05-30 DIAGNOSIS — I10 ESSENTIAL HYPERTENSION: ICD-10-CM

## 2023-05-30 DIAGNOSIS — Z95.820 S/P ANGIOPLASTY WITH STENT: ICD-10-CM

## 2023-05-30 DIAGNOSIS — E11.9 TYPE 2 DIABETES MELLITUS WITHOUT COMPLICATION, WITH LONG-TERM CURRENT USE OF INSULIN (HCC): ICD-10-CM

## 2023-05-30 DIAGNOSIS — Z79.4 TYPE 2 DIABETES MELLITUS WITHOUT COMPLICATION, WITH LONG-TERM CURRENT USE OF INSULIN (HCC): ICD-10-CM

## 2023-05-30 DIAGNOSIS — I25.10 CORONARY ARTERY DISEASE INVOLVING NATIVE CORONARY ARTERY OF NATIVE HEART WITHOUT ANGINA PECTORIS: Primary | ICD-10-CM

## 2023-05-30 PROCEDURE — 3017F COLORECTAL CA SCREEN DOC REV: CPT | Performed by: INTERNAL MEDICINE

## 2023-05-30 PROCEDURE — G8427 DOCREV CUR MEDS BY ELIG CLIN: HCPCS | Performed by: INTERNAL MEDICINE

## 2023-05-30 PROCEDURE — 3051F HG A1C>EQUAL 7.0%<8.0%: CPT | Performed by: INTERNAL MEDICINE

## 2023-05-30 PROCEDURE — 1123F ACP DISCUSS/DSCN MKR DOCD: CPT | Performed by: INTERNAL MEDICINE

## 2023-05-30 PROCEDURE — 3074F SYST BP LT 130 MM HG: CPT | Performed by: INTERNAL MEDICINE

## 2023-05-30 PROCEDURE — 99214 OFFICE O/P EST MOD 30 MIN: CPT | Performed by: INTERNAL MEDICINE

## 2023-05-30 PROCEDURE — 2022F DILAT RTA XM EVC RTNOPTHY: CPT | Performed by: INTERNAL MEDICINE

## 2023-05-30 PROCEDURE — 1036F TOBACCO NON-USER: CPT | Performed by: INTERNAL MEDICINE

## 2023-05-30 PROCEDURE — 3078F DIAST BP <80 MM HG: CPT | Performed by: INTERNAL MEDICINE

## 2023-05-30 PROCEDURE — G8417 CALC BMI ABV UP PARAM F/U: HCPCS | Performed by: INTERNAL MEDICINE

## 2023-05-30 RX ORDER — CHOLECALCIFEROL (VITAMIN D3) 25 MCG
TABLET,CHEWABLE ORAL
COMMUNITY

## 2023-05-30 ASSESSMENT — ENCOUNTER SYMPTOMS
SHORTNESS OF BREATH: 0
COUGH: 0

## 2023-05-30 NOTE — PROGRESS NOTES
leg: No edema. Left lower leg: No edema. Skin:     General: Skin is warm and dry. Neurological:      General: No focal deficit present. Mental Status: He is alert and oriented to person, place, and time. Psychiatric:         Mood and Affect: Mood normal.         Behavior: Behavior normal.         Judgment: Judgment normal.       Medical problems, medical history, and test results were reviewed with the patient today. No results found for this or any previous visit (from the past 168 hour(s)). Current Outpatient Medications:     Cholecalciferol (D3 5000 PO), Take by mouth, Disp: , Rfl:     Cyanocobalamin (B-12) 2500 MCG TABS, Take by mouth, Disp: , Rfl:     nebivolol (BYSTOLIC) 10 MG tablet, Take 1 tablet by mouth daily, Disp: 90 tablet, Rfl: 3    valsartan (DIOVAN) 320 MG tablet, Take 1 tablet by mouth daily, Disp: 90 tablet, Rfl: 3    empagliflozin (JARDIANCE) 25 MG tablet, Take 1 tablet by mouth daily, Disp: 30 tablet, Rfl: 5    omega-3 acid ethyl esters (LOVAZA) 1 g capsule, Take 2 capsules by mouth 2 times daily, Disp: 360 capsule, Rfl: 3    levothyroxine (SYNTHROID) 112 MCG tablet, Take 0.5 tablets by mouth every morning (before breakfast), Disp: 45 tablet, Rfl: 3    rosuvastatin (CRESTOR) 40 MG tablet, Take 1 tablet by mouth daily, Disp: 90 tablet, Rfl: 3    omeprazole (PRILOSEC) 40 MG delayed release capsule, Take 1 capsule by mouth daily (before lunch), Disp: 90 capsule, Rfl: 3    chlorthalidone (HYGROTON) 25 MG tablet, Take 1 tablet by mouth daily, Disp: 90 tablet, Rfl: 3    aspirin 81 MG EC tablet, Take by mouth daily, Disp: , Rfl:     Insulin Degludec (TRESIBA FLEXTOUCH) 200 UNIT/ML SOPN, Inject 92 Units into the skin every morning, Disp: , Rfl:     ezetimibe (ZETIA) 10 MG tablet, Take 1 tablet by mouth daily, Disp: 30 tablet, Rfl: 3     ASSESSMENT/PLAN:      Cardiovascular Testing:  - Stress echo 1/19/2022: Negative for ischemia, LVEF greater than 55%.   -LHC/RHC 1/12/2021: PCI

## 2023-05-31 DIAGNOSIS — N18.31 STAGE 3A CHRONIC KIDNEY DISEASE (HCC): ICD-10-CM

## 2023-05-31 DIAGNOSIS — E11.51 TYPE 2 DIABETES MELLITUS WITH PERIPHERAL CIRCULATORY DISORDER (HCC): ICD-10-CM

## 2023-05-31 DIAGNOSIS — E03.9 ACQUIRED HYPOTHYROIDISM: ICD-10-CM

## 2023-05-31 DIAGNOSIS — I10 ESSENTIAL HYPERTENSION: ICD-10-CM

## 2023-05-31 DIAGNOSIS — R74.8 ELEVATED LIVER ENZYMES: ICD-10-CM

## 2023-05-31 DIAGNOSIS — E78.2 MIXED HYPERLIPIDEMIA: ICD-10-CM

## 2023-05-31 DIAGNOSIS — E55.9 VITAMIN D DEFICIENCY: ICD-10-CM

## 2023-05-31 DIAGNOSIS — I25.118 CORONARY ARTERY DISEASE OF NATIVE ARTERY OF NATIVE HEART WITH STABLE ANGINA PECTORIS (HCC): ICD-10-CM

## 2023-05-31 LAB
CREAT UR-MCNC: 146 MG/DL
EST. AVERAGE GLUCOSE BLD GHB EST-MCNC: 192 MG/DL
HBA1C MFR BLD: 8.3 % (ref 4.8–5.6)
MICROALBUMIN UR-MCNC: 2.38 MG/DL
MICROALBUMIN/CREAT UR-RTO: 16 MG/G (ref 0–30)

## 2023-06-01 LAB
25(OH)D3 SERPL-MCNC: 55.8 NG/ML (ref 30–100)
ALBUMIN SERPL-MCNC: 3.8 G/DL (ref 3.2–4.6)
ALBUMIN/GLOB SERPL: 1.1 (ref 0.4–1.6)
ALP SERPL-CCNC: 48 U/L (ref 50–136)
ALT SERPL-CCNC: 24 U/L (ref 12–65)
ANION GAP SERPL CALC-SCNC: 8 MMOL/L (ref 2–11)
AST SERPL-CCNC: 11 U/L (ref 15–37)
BILIRUB SERPL-MCNC: 0.5 MG/DL (ref 0.2–1.1)
BUN SERPL-MCNC: 36 MG/DL (ref 8–23)
CALCIUM SERPL-MCNC: 9.3 MG/DL (ref 8.3–10.4)
CHLORIDE SERPL-SCNC: 107 MMOL/L (ref 101–110)
CHOLEST SERPL-MCNC: 141 MG/DL
CO2 SERPL-SCNC: 27 MMOL/L (ref 21–32)
CREAT SERPL-MCNC: 1.4 MG/DL (ref 0.8–1.5)
GLOBULIN SER CALC-MCNC: 3.6 G/DL (ref 2.8–4.5)
GLUCOSE SERPL-MCNC: 135 MG/DL (ref 65–100)
HDLC SERPL-MCNC: 40 MG/DL (ref 40–60)
HDLC SERPL: 3.5
LDLC SERPL CALC-MCNC: 61.4 MG/DL
POTASSIUM SERPL-SCNC: 4.2 MMOL/L (ref 3.5–5.1)
PROT SERPL-MCNC: 7.4 G/DL (ref 6.3–8.2)
SODIUM SERPL-SCNC: 142 MMOL/L (ref 133–143)
TRIGL SERPL-MCNC: 198 MG/DL (ref 35–150)
TSH, 3RD GENERATION: 2.28 UIU/ML (ref 0.36–3.74)
VLDLC SERPL CALC-MCNC: 39.6 MG/DL (ref 6–23)

## 2023-06-05 ENCOUNTER — OFFICE VISIT (OUTPATIENT)
Dept: INTERNAL MEDICINE CLINIC | Facility: CLINIC | Age: 73
End: 2023-06-05
Payer: MEDICARE

## 2023-06-05 VITALS
DIASTOLIC BLOOD PRESSURE: 66 MMHG | BODY MASS INDEX: 28.12 KG/M2 | TEMPERATURE: 97.2 F | HEIGHT: 66 IN | WEIGHT: 175 LBS | HEART RATE: 55 BPM | OXYGEN SATURATION: 96 % | SYSTOLIC BLOOD PRESSURE: 120 MMHG

## 2023-06-05 DIAGNOSIS — E55.9 VITAMIN D DEFICIENCY: ICD-10-CM

## 2023-06-05 DIAGNOSIS — R74.8 ELEVATED LIVER ENZYMES: ICD-10-CM

## 2023-06-05 DIAGNOSIS — N18.31 STAGE 3A CHRONIC KIDNEY DISEASE (HCC): ICD-10-CM

## 2023-06-05 DIAGNOSIS — E11.51 TYPE 2 DIABETES MELLITUS WITH PERIPHERAL CIRCULATORY DISORDER (HCC): Primary | ICD-10-CM

## 2023-06-05 DIAGNOSIS — E03.9 ACQUIRED HYPOTHYROIDISM: ICD-10-CM

## 2023-06-05 DIAGNOSIS — I10 ESSENTIAL HYPERTENSION: ICD-10-CM

## 2023-06-05 DIAGNOSIS — E78.2 MIXED HYPERLIPIDEMIA: ICD-10-CM

## 2023-06-05 DIAGNOSIS — I25.118 CORONARY ARTERY DISEASE OF NATIVE ARTERY OF NATIVE HEART WITH STABLE ANGINA PECTORIS (HCC): ICD-10-CM

## 2023-06-05 PROCEDURE — 2022F DILAT RTA XM EVC RTNOPTHY: CPT | Performed by: PHYSICIAN ASSISTANT

## 2023-06-05 PROCEDURE — G8417 CALC BMI ABV UP PARAM F/U: HCPCS | Performed by: PHYSICIAN ASSISTANT

## 2023-06-05 PROCEDURE — 1123F ACP DISCUSS/DSCN MKR DOCD: CPT | Performed by: PHYSICIAN ASSISTANT

## 2023-06-05 PROCEDURE — 3074F SYST BP LT 130 MM HG: CPT | Performed by: PHYSICIAN ASSISTANT

## 2023-06-05 PROCEDURE — 1036F TOBACCO NON-USER: CPT | Performed by: PHYSICIAN ASSISTANT

## 2023-06-05 PROCEDURE — 3017F COLORECTAL CA SCREEN DOC REV: CPT | Performed by: PHYSICIAN ASSISTANT

## 2023-06-05 PROCEDURE — 3052F HG A1C>EQUAL 8.0%<EQUAL 9.0%: CPT | Performed by: PHYSICIAN ASSISTANT

## 2023-06-05 PROCEDURE — 3078F DIAST BP <80 MM HG: CPT | Performed by: PHYSICIAN ASSISTANT

## 2023-06-05 PROCEDURE — G8427 DOCREV CUR MEDS BY ELIG CLIN: HCPCS | Performed by: PHYSICIAN ASSISTANT

## 2023-06-05 PROCEDURE — 99215 OFFICE O/P EST HI 40 MIN: CPT | Performed by: PHYSICIAN ASSISTANT

## 2023-06-05 RX ORDER — LEVOTHYROXINE SODIUM 112 UG/1
56 TABLET ORAL
Qty: 45 TABLET | Refills: 3 | Status: CANCELLED | OUTPATIENT
Start: 2023-06-05

## 2023-06-05 RX ORDER — INSULIN DEGLUDEC 200 U/ML
60 INJECTION, SOLUTION SUBCUTANEOUS EVERY MORNING
Qty: 27 ML | Refills: 3 | Status: SHIPPED | OUTPATIENT
Start: 2023-06-05

## 2023-06-05 RX ORDER — ROSUVASTATIN CALCIUM 40 MG/1
40 TABLET, COATED ORAL DAILY
Qty: 90 TABLET | Refills: 3 | Status: CANCELLED | OUTPATIENT
Start: 2023-06-05

## 2023-06-05 RX ORDER — CHLORTHALIDONE 25 MG/1
25 TABLET ORAL DAILY
Qty: 90 TABLET | Refills: 3 | Status: SHIPPED | OUTPATIENT
Start: 2023-06-05

## 2023-06-05 RX ORDER — OMEPRAZOLE 40 MG/1
40 CAPSULE, DELAYED RELEASE ORAL
Qty: 90 CAPSULE | Refills: 3 | Status: CANCELLED | OUTPATIENT
Start: 2023-06-05

## 2023-06-05 ASSESSMENT — PATIENT HEALTH QUESTIONNAIRE - PHQ9
1. LITTLE INTEREST OR PLEASURE IN DOING THINGS: 0
2. FEELING DOWN, DEPRESSED OR HOPELESS: 0
SUM OF ALL RESPONSES TO PHQ QUESTIONS 1-9: 0
SUM OF ALL RESPONSES TO PHQ9 QUESTIONS 1 & 2: 0

## 2023-06-05 ASSESSMENT — ENCOUNTER SYMPTOMS
SHORTNESS OF BREATH: 0
DIARRHEA: 0
CONSTIPATION: 1

## 2023-06-05 NOTE — PATIENT INSTRUCTIONS
Asked patient to send me the nutrition facts of [employer]'s bars & the bars he is currently eating so I can compare to other options in hopes of finding something with less carbohydrates/calories  Praised his exercise routine and urged to maintain this  Reminded to stay well hydrated with plenty of water  Monitor blood sugar 2-3 times/day and keep record to bring to next appointment  Continue chronic medications as prescribed  Monitor blood pressure 1-2 times/week and keep record to bring to next appointment  Reassured that liver enzymes have normalized since he has discontinued Optavia snacks/bars

## 2023-06-05 NOTE — PROGRESS NOTES
Gill Santos (:  1950) is a 71 y.o. male,Established patient, here for evaluation of the following chief complaint(s):  Follow-up (Diabetes, Hypothyroidism, Hypertension, Hyperlipidemia, Vit D Def, CKD) and Discuss Labs         ASSESSMENT/PLAN:  1. Type 2 diabetes mellitus with peripheral circulatory disorder (HCC)  -     Insulin Degludec (TRESIBA FLEXTOUCH) 200 UNIT/ML SOPN; Inject 60 Units into the skin every morning, Disp-27 mL, R-3Normal  -     Comprehensive Metabolic Panel; Future  -     Hemoglobin A1C; Future  2. Mixed hyperlipidemia  -     Comprehensive Metabolic Panel; Future  -     Lipid Panel; Future  3. Coronary artery disease of native artery of native heart with stable angina pectoris ([facility] [address]  -     Lipid Panel; Future  4. Essential hypertension  -     CBC with Auto Differential; Future  -     Comprehensive Metabolic Panel; Future  5. Acquired hypothyroidism  -     TSH; Future  6. Vitamin D deficiency  7. Elevated liver enzymes  Comments:  Resolved  Orders:  -     Comprehensive Metabolic Panel; Future  8. Stage 3a chronic kidney disease (HCC)  -     CBC with Auto Differential; Future  -     Comprehensive Metabolic Panel; Future      Patient Instructions   Asked patient to send me the nutrition facts of [employer]'s bars & the bars he is currently eating so I can compare to other options in hopes of finding something with less carbohydrates/calories  Praised his exercise routine and urged to maintain this  Reminded to stay well hydrated with plenty of water  Monitor blood sugar 2-3 times/day and keep record to bring to next appointment  Continue chronic medications as prescribed  Monitor blood pressure 1-2 times/week and keep record to bring to next appointment  Reassured that liver enzymes have normalized since he has discontinued [employer] snacks/bars        Return in 3 months (on 2023) for MWE + diabetes .          Subjective   SUBJECTIVE/OBJECTIVE:  The patient is a 71 y.o. male who is

## 2023-09-21 RX ORDER — OMEPRAZOLE 40 MG/1
CAPSULE, DELAYED RELEASE ORAL
Qty: 90 CAPSULE | Refills: 3 | OUTPATIENT
Start: 2023-09-21

## 2023-09-25 RX ORDER — OMEPRAZOLE 40 MG/1
40 CAPSULE, DELAYED RELEASE ORAL
Qty: 30 CAPSULE | Refills: 0 | Status: SHIPPED | OUTPATIENT
Start: 2023-09-25 | End: 2023-11-01 | Stop reason: SDUPTHER

## 2023-09-25 RX ORDER — LEVOTHYROXINE SODIUM 112 UG/1
56 TABLET ORAL
Qty: 15 TABLET | Refills: 0 | Status: SHIPPED | OUTPATIENT
Start: 2023-09-25 | End: 2023-11-01 | Stop reason: SDUPTHER

## 2023-09-25 NOTE — TELEPHONE ENCOUNTER
Actually it looks like there is an opening at 10:00 am on Tuesday, Oct 10 for the full hour so he could get both done at the same time. If that doesn't work for him then we can break up the appointments as outlined in details below. I will send in a 30 day supply but he'll need to be seen within those 30 days for at least his diabetes f/u.

## 2023-09-25 NOTE — TELEPHONE ENCOUNTER
Pt is scheduled for 12/4/23 for his AWV. Pt is requesting enough refills to last until his appt, please.

## 2023-09-25 NOTE — TELEPHONE ENCOUNTER
Thanks for that info. That's too far out given that he is a diabetic and is uncontrolled! We'll just have to break up the appointments. He needs to come in at next available on my schedule for diabetes f/u with labs done before and then keep that December appointment for [facility].

## 2023-10-13 NOTE — TELEPHONE ENCOUNTER
Please call patient and get more details about what is going on? If he is in [facility] then we could do a virtual visit for the diabetes to check in on that. I'm concerned about going this long between labs given that diabetes wasn't doing as well the last time.

## 2023-10-20 NOTE — TELEPHONE ENCOUNTER
Pt states that he is in [facility] and does not mind doing a VV with you, but he will be unable to do labs prior. He is scheduled for his AWV on 12/4/23 and states he will do a walk-in at our lab prior to his visit. If you do not need to do a VV with him due to him not being able to complete labs prior, he is requesting short term refills of his medications to go to his [address] [place] [address] Road in [facility] to last until his Dec appt with you. Please advise.

## 2023-11-01 RX ORDER — OMEPRAZOLE 40 MG/1
40 CAPSULE, DELAYED RELEASE ORAL
Qty: 30 CAPSULE | Refills: 1 | Status: SHIPPED | OUTPATIENT
Start: 2023-11-01

## 2023-11-01 RX ORDER — LEVOTHYROXINE SODIUM 112 UG/1
56 TABLET ORAL
Qty: 15 TABLET | Refills: 1 | Status: SHIPPED | OUTPATIENT
Start: 2023-11-01

## 2023-11-21 DIAGNOSIS — N18.31 STAGE 3A CHRONIC KIDNEY DISEASE (HCC): ICD-10-CM

## 2023-11-21 DIAGNOSIS — E11.51 TYPE 2 DIABETES MELLITUS WITH PERIPHERAL CIRCULATORY DISORDER (HCC): ICD-10-CM

## 2023-11-21 DIAGNOSIS — I25.118 CORONARY ARTERY DISEASE OF NATIVE ARTERY OF NATIVE HEART WITH STABLE ANGINA PECTORIS (HCC): ICD-10-CM

## 2023-11-21 DIAGNOSIS — E03.9 ACQUIRED HYPOTHYROIDISM: ICD-10-CM

## 2023-11-21 DIAGNOSIS — R74.8 ELEVATED LIVER ENZYMES: ICD-10-CM

## 2023-11-21 DIAGNOSIS — E78.2 MIXED HYPERLIPIDEMIA: ICD-10-CM

## 2023-11-21 DIAGNOSIS — I10 ESSENTIAL HYPERTENSION: ICD-10-CM

## 2023-11-21 LAB
ALBUMIN SERPL-MCNC: 3.8 G/DL (ref 3.2–4.6)
ALBUMIN/GLOB SERPL: 1.2 (ref 0.4–1.6)
ALP SERPL-CCNC: 41 U/L (ref 50–136)
ALT SERPL-CCNC: 27 U/L (ref 12–65)
ANION GAP SERPL CALC-SCNC: 6 MMOL/L (ref 2–11)
AST SERPL-CCNC: 13 U/L (ref 15–37)
BASOPHILS # BLD: 0 K/UL (ref 0–0.2)
BASOPHILS NFR BLD: 1 % (ref 0–2)
BILIRUB SERPL-MCNC: 0.4 MG/DL (ref 0.2–1.1)
BUN SERPL-MCNC: 32 MG/DL (ref 8–23)
CALCIUM SERPL-MCNC: 10 MG/DL (ref 8.3–10.4)
CHLORIDE SERPL-SCNC: 108 MMOL/L (ref 101–110)
CHOLEST SERPL-MCNC: 177 MG/DL
CO2 SERPL-SCNC: 27 MMOL/L (ref 21–32)
CREAT SERPL-MCNC: 1.4 MG/DL (ref 0.8–1.5)
DIFFERENTIAL METHOD BLD: ABNORMAL
EOSINOPHIL # BLD: 0.2 K/UL (ref 0–0.8)
EOSINOPHIL NFR BLD: 3 % (ref 0.5–7.8)
ERYTHROCYTE [DISTWIDTH] IN BLOOD BY AUTOMATED COUNT: 12.6 % (ref 11.9–14.6)
GLOBULIN SER CALC-MCNC: 3.1 G/DL (ref 2.8–4.5)
GLUCOSE SERPL-MCNC: 182 MG/DL (ref 65–100)
HCT VFR BLD AUTO: 43.2 % (ref 41.1–50.3)
HDLC SERPL-MCNC: 37 MG/DL (ref 40–60)
HDLC SERPL: 4.8
HGB BLD-MCNC: 13.9 G/DL (ref 13.6–17.2)
IMM GRANULOCYTES # BLD AUTO: 0 K/UL (ref 0–0.5)
IMM GRANULOCYTES NFR BLD AUTO: 0 % (ref 0–5)
LDLC SERPL CALC-MCNC: 70 MG/DL
LYMPHOCYTES # BLD: 2.3 K/UL (ref 0.5–4.6)
LYMPHOCYTES NFR BLD: 35 % (ref 13–44)
MCH RBC QN AUTO: 29.8 PG (ref 26.1–32.9)
MCHC RBC AUTO-ENTMCNC: 32.2 G/DL (ref 31.4–35)
MCV RBC AUTO: 92.5 FL (ref 82–102)
MONOCYTES # BLD: 0.4 K/UL (ref 0.1–1.3)
MONOCYTES NFR BLD: 7 % (ref 4–12)
NEUTS SEG # BLD: 3.5 K/UL (ref 1.7–8.2)
NEUTS SEG NFR BLD: 54 % (ref 43–78)
NRBC # BLD: 0 K/UL (ref 0–0.2)
PLATELET # BLD AUTO: 147 K/UL (ref 150–450)
PMV BLD AUTO: 11.2 FL (ref 9.4–12.3)
POTASSIUM SERPL-SCNC: 4.5 MMOL/L (ref 3.5–5.1)
PROT SERPL-MCNC: 6.9 G/DL (ref 6.3–8.2)
RBC # BLD AUTO: 4.67 M/UL (ref 4.23–5.6)
SODIUM SERPL-SCNC: 141 MMOL/L (ref 133–143)
TRIGL SERPL-MCNC: 350 MG/DL (ref 35–150)
TSH, 3RD GENERATION: 1.97 UIU/ML (ref 0.36–3.74)
VLDLC SERPL CALC-MCNC: 70 MG/DL (ref 6–23)
WBC # BLD AUTO: 6.5 K/UL (ref 4.3–11.1)

## 2023-11-22 LAB
EST. AVERAGE GLUCOSE BLD GHB EST-MCNC: 232 MG/DL
HBA1C MFR BLD: 9.7 % (ref 4.8–5.6)

## 2023-11-30 ENCOUNTER — OFFICE VISIT (OUTPATIENT)
Age: 73
End: 2023-11-30

## 2023-11-30 VITALS
WEIGHT: 184 LBS | SYSTOLIC BLOOD PRESSURE: 118 MMHG | HEIGHT: 66 IN | HEART RATE: 52 BPM | BODY MASS INDEX: 29.57 KG/M2 | DIASTOLIC BLOOD PRESSURE: 70 MMHG

## 2023-11-30 DIAGNOSIS — Z95.820 S/P ANGIOPLASTY WITH STENT: ICD-10-CM

## 2023-11-30 DIAGNOSIS — I10 ESSENTIAL HYPERTENSION: ICD-10-CM

## 2023-11-30 DIAGNOSIS — E78.2 MIXED HYPERLIPIDEMIA: ICD-10-CM

## 2023-11-30 DIAGNOSIS — R01.1 MURMUR, CARDIAC: ICD-10-CM

## 2023-11-30 DIAGNOSIS — I25.10 CORONARY ARTERY DISEASE INVOLVING NATIVE CORONARY ARTERY OF NATIVE HEART WITHOUT ANGINA PECTORIS: Primary | ICD-10-CM

## 2023-11-30 RX ORDER — NITROGLYCERIN 0.4 MG/1
0.4 TABLET SUBLINGUAL EVERY 5 MIN PRN
Qty: 25 TABLET | Refills: 3 | Status: SHIPPED | OUTPATIENT
Start: 2023-11-30

## 2023-11-30 ASSESSMENT — ENCOUNTER SYMPTOMS
WHEEZING: 0
GASTROINTESTINAL NEGATIVE: 1
SHORTNESS OF BREATH: 0
COUGH: 0

## 2023-11-30 NOTE — PROGRESS NOTES
[address] [facility], [address] [place] [address], [address]      Patient:  Daquan Walter  1950         SUBJECTIVE:  Severiano Bell is a  68 y.o. male seen for a follow up visit regarding the following:     Chief Complaint   Patient presents with    6 Month Follow-Up    Coronary Artery Disease   . CC: CAD follow-up     HPI:   68 y.o. male with a history of CAD with prior stent in 2021, HTN, HLD, type II DM, AMBER on CPAP who is here for follow-up. Patient was last seen in office on 5/30/23 , since then reports that he has been doing well. Denies chest pain or chest pressure, racing heart or palpitations, shortness of breath, cough, wheeze, leg swelling, dizziness or lightheadedness. Takes medicines as directed and follows regularly with his PCP, reports his A1c has been elevated and he been trying to improve his glycemic control. No other complaints today. Cardiovascular Testing:  - Stress echo 1/19/2022: Negative for ischemia, LVEF greater than 55%. -LHC/RHC 1/12/2021: PCI with stent LAD/diagonal, PCI with stent OM 1.  -Echo 12/15/2020: LVEF 64%, RV mod dilated, normal RV systolic function, mild aortic sclerosis, no hemodynamic valvular abnormalities. Past medical history, past surgical history, family history, social history, and medications were all reviewed with the patient today and updated as necessary.          Patient Active Problem List    Diagnosis Date Noted    Coronary artery disease of native artery of native heart with stable angina pectoris ([address] ) 01/12/2021     Priority: High    Type 2 diabetes mellitus with hyperglycemia 11/29/2022     Priority: Medium    Type 2 diabetes mellitus with chronic kidney disease 11/29/2022     Priority: Medium    Pulmonary hypertension, unspecified ([address] ) 08/30/2022     Priority: Medium    Chronic renal disease, stage III [facility]) [[id number]] 08/29/2022     Priority: Medium    Type 2 diabetes mellitus, with long-term current

## 2023-12-04 ENCOUNTER — OFFICE VISIT (OUTPATIENT)
Dept: INTERNAL MEDICINE CLINIC | Facility: CLINIC | Age: 73
End: 2023-12-04
Payer: MEDICARE

## 2023-12-04 VITALS
HEIGHT: 66 IN | SYSTOLIC BLOOD PRESSURE: 110 MMHG | DIASTOLIC BLOOD PRESSURE: 70 MMHG | TEMPERATURE: 97.6 F | BODY MASS INDEX: 29.41 KG/M2 | OXYGEN SATURATION: 96 % | HEART RATE: 63 BPM | WEIGHT: 183 LBS

## 2023-12-04 DIAGNOSIS — I25.118 CORONARY ARTERY DISEASE OF NATIVE ARTERY OF NATIVE HEART WITH STABLE ANGINA PECTORIS (HCC): ICD-10-CM

## 2023-12-04 DIAGNOSIS — N18.31 STAGE 3A CHRONIC KIDNEY DISEASE (HCC): ICD-10-CM

## 2023-12-04 DIAGNOSIS — K21.9 GASTRO-ESOPHAGEAL REFLUX DISEASE WITHOUT ESOPHAGITIS: ICD-10-CM

## 2023-12-04 DIAGNOSIS — I10 ESSENTIAL HYPERTENSION: ICD-10-CM

## 2023-12-04 DIAGNOSIS — Z23 NEED FOR INFLUENZA VACCINATION: ICD-10-CM

## 2023-12-04 DIAGNOSIS — Z00.00 MEDICARE ANNUAL WELLNESS VISIT, SUBSEQUENT: Primary | ICD-10-CM

## 2023-12-04 DIAGNOSIS — I27.20 PULMONARY HYPERTENSION, UNSPECIFIED (HCC): ICD-10-CM

## 2023-12-04 DIAGNOSIS — E55.9 VITAMIN D DEFICIENCY: ICD-10-CM

## 2023-12-04 DIAGNOSIS — Z12.5 SCREENING FOR PROSTATE CANCER: ICD-10-CM

## 2023-12-04 DIAGNOSIS — E78.2 MIXED HYPERLIPIDEMIA: ICD-10-CM

## 2023-12-04 DIAGNOSIS — E11.51 TYPE 2 DIABETES MELLITUS WITH PERIPHERAL CIRCULATORY DISORDER (HCC): ICD-10-CM

## 2023-12-04 DIAGNOSIS — E03.9 ACQUIRED HYPOTHYROIDISM: ICD-10-CM

## 2023-12-04 PROBLEM — G47.33 OSA (OBSTRUCTIVE SLEEP APNEA): Status: ACTIVE | Noted: 2017-10-27

## 2023-12-04 PROBLEM — R06.09 DOE (DYSPNEA ON EXERTION): Status: ACTIVE | Noted: 2017-12-11

## 2023-12-04 PROCEDURE — 1036F TOBACCO NON-USER: CPT | Performed by: PHYSICIAN ASSISTANT

## 2023-12-04 PROCEDURE — 3078F DIAST BP <80 MM HG: CPT | Performed by: PHYSICIAN ASSISTANT

## 2023-12-04 PROCEDURE — G0008 ADMIN INFLUENZA VIRUS VAC: HCPCS | Performed by: PHYSICIAN ASSISTANT

## 2023-12-04 PROCEDURE — 2022F DILAT RTA XM EVC RTNOPTHY: CPT | Performed by: PHYSICIAN ASSISTANT

## 2023-12-04 PROCEDURE — 3017F COLORECTAL CA SCREEN DOC REV: CPT | Performed by: PHYSICIAN ASSISTANT

## 2023-12-04 PROCEDURE — 1123F ACP DISCUSS/DSCN MKR DOCD: CPT | Performed by: PHYSICIAN ASSISTANT

## 2023-12-04 PROCEDURE — G0439 PPPS, SUBSEQ VISIT: HCPCS | Performed by: PHYSICIAN ASSISTANT

## 2023-12-04 PROCEDURE — 99214 OFFICE O/P EST MOD 30 MIN: CPT | Performed by: PHYSICIAN ASSISTANT

## 2023-12-04 PROCEDURE — 90694 VACC AIIV4 NO PRSRV 0.5ML IM: CPT | Performed by: PHYSICIAN ASSISTANT

## 2023-12-04 PROCEDURE — 3046F HEMOGLOBIN A1C LEVEL >9.0%: CPT | Performed by: PHYSICIAN ASSISTANT

## 2023-12-04 PROCEDURE — G8484 FLU IMMUNIZE NO ADMIN: HCPCS | Performed by: PHYSICIAN ASSISTANT

## 2023-12-04 PROCEDURE — 3074F SYST BP LT 130 MM HG: CPT | Performed by: PHYSICIAN ASSISTANT

## 2023-12-04 PROCEDURE — G8417 CALC BMI ABV UP PARAM F/U: HCPCS | Performed by: PHYSICIAN ASSISTANT

## 2023-12-04 PROCEDURE — G8427 DOCREV CUR MEDS BY ELIG CLIN: HCPCS | Performed by: PHYSICIAN ASSISTANT

## 2023-12-04 RX ORDER — LEVOTHYROXINE SODIUM 112 UG/1
56 TABLET ORAL
Qty: 45 TABLET | Refills: 3 | Status: SHIPPED | OUTPATIENT
Start: 2023-12-04

## 2023-12-04 RX ORDER — NEBIVOLOL 10 MG/1
10 TABLET ORAL DAILY
Qty: 90 TABLET | Refills: 3 | Status: SHIPPED | OUTPATIENT
Start: 2023-12-04

## 2023-12-04 RX ORDER — OMEGA-3-ACID ETHYL ESTERS 1 G/1
2 CAPSULE, LIQUID FILLED ORAL 2 TIMES DAILY
Qty: 360 CAPSULE | Refills: 3 | Status: CANCELLED | OUTPATIENT
Start: 2023-12-04

## 2023-12-04 RX ORDER — ROSUVASTATIN CALCIUM 40 MG/1
40 TABLET, COATED ORAL DAILY
Qty: 90 TABLET | Refills: 3 | Status: SHIPPED | OUTPATIENT
Start: 2023-12-04

## 2023-12-04 RX ORDER — VALSARTAN 320 MG/1
320 TABLET ORAL DAILY
Qty: 90 TABLET | Refills: 3 | Status: CANCELLED | OUTPATIENT
Start: 2023-12-04

## 2023-12-04 RX ORDER — OMEPRAZOLE 40 MG/1
40 CAPSULE, DELAYED RELEASE ORAL
Qty: 90 CAPSULE | Refills: 3 | Status: SHIPPED | OUTPATIENT
Start: 2023-12-04

## 2023-12-04 RX ORDER — FENOFIBRATE 160 MG/1
160 TABLET ORAL DAILY
Qty: 90 TABLET | Refills: 1 | Status: SHIPPED | OUTPATIENT
Start: 2023-12-04

## 2023-12-04 ASSESSMENT — PATIENT HEALTH QUESTIONNAIRE - PHQ9
SUM OF ALL RESPONSES TO PHQ9 QUESTIONS 1 & 2: 0
6. FEELING BAD ABOUT YOURSELF - OR THAT YOU ARE A FAILURE OR HAVE LET YOURSELF OR YOUR FAMILY DOWN: 0
8. MOVING OR SPEAKING SO SLOWLY THAT OTHER PEOPLE COULD HAVE NOTICED. OR THE OPPOSITE, BEING SO FIGETY OR RESTLESS THAT YOU HAVE BEEN MOVING AROUND A LOT MORE THAN USUAL: 0
3. TROUBLE FALLING OR STAYING ASLEEP: 0
9. THOUGHTS THAT YOU WOULD BE BETTER OFF DEAD, OR OF HURTING YOURSELF: 0
4. FEELING TIRED OR HAVING LITTLE ENERGY: 0
SUM OF ALL RESPONSES TO PHQ QUESTIONS 1-9: 0
7. TROUBLE CONCENTRATING ON THINGS, SUCH AS READING THE NEWSPAPER OR WATCHING TELEVISION: 0
5. POOR APPETITE OR OVEREATING: 0
SUM OF ALL RESPONSES TO PHQ QUESTIONS 1-9: 0
SUM OF ALL RESPONSES TO PHQ QUESTIONS 1-9: 0
1. LITTLE INTEREST OR PLEASURE IN DOING THINGS: 0
SUM OF ALL RESPONSES TO PHQ QUESTIONS 1-9: 0
2. FEELING DOWN, DEPRESSED OR HOPELESS: 0

## 2023-12-04 ASSESSMENT — ENCOUNTER SYMPTOMS
CONSTIPATION: 1
DIARRHEA: 0
SHORTNESS OF BREATH: 0

## 2023-12-04 ASSESSMENT — LIFESTYLE VARIABLES
HOW OFTEN DO YOU HAVE A DRINK CONTAINING ALCOHOL: NEVER
HOW MANY STANDARD DRINKS CONTAINING ALCOHOL DO YOU HAVE ON A TYPICAL DAY: PATIENT DOES NOT DRINK

## 2023-12-04 NOTE — PROGRESS NOTES
Alla Smith (:  1950) is a 68 y.o. male,Established patient, here for evaluation of the following chief complaint(s):  Medicare AWV (Pt is here for his annual routine Medicare wellness visit.), Diabetes (Pt is here for his 3 month diabetic f/u with lab review. ), Hypertension, Hypothyroidism, and Mixed dyslipidemia         ASSESSMENT/PLAN:  1. Mixed hyperlipidemia  The following orders have not been finalized:  -     rosuvastatin (CRESTOR) 40 MG tablet  -     omega-3 acid ethyl esters (LOVAZA) 1 g capsule  2. Type 2 diabetes mellitus with peripheral circulatory disorder (HCC)  The following orders have not been finalized:  -     empagliflozin (JARDIANCE) 25 MG tablet      No follow-ups on file. Subjective   SUBJECTIVE/OBJECTIVE:  HPI    Review of Systems   Constitutional:  Positive for unexpected weight change (8 pounds gained in the past 6 months). Negative for fatigue. Eyes:  Negative for visual disturbance. Respiratory:  Negative for shortness of breath. Cardiovascular:  Negative for chest pain, palpitations and leg swelling. Gastrointestinal:  Positive for constipation (worse than usual). Negative for diarrhea. Endocrine: Negative for cold intolerance, heat intolerance and polydipsia. Negative for hair loss   Genitourinary:  Positive for frequency. Musculoskeletal:  Positive for myalgias (bilateral feet - occasional). Neurological:  Negative for dizziness, numbness and headaches. Objective   Physical Exam       On this date 2023 I have spent *** minutes reviewing previous notes, test results and face to face with the patient discussing the diagnosis and importance of compliance with the treatment plan as well as documenting on the day of the visit. An electronic signature was used to authenticate this note.     --Jose Edmonds LPN
greater. Reapply every 2 to 3 hours or after sweating, drying off with a towel, or swimming. Always wear a seat belt when traveling in a car. Always wear a helmet when riding a bicycle or motorcycle. Patient Education       influenza virus vaccine (injection)  Pronunciation:  IN perlita EN jona REID seen  Brand:  Afluria PF Pediatric Quadrivalent 3652-8710, Afluria PF Quadrivalent 3080-1296, Afluria Quadrivalent 4507-2710, Fluarix PF Quadrivalent 8820-2430, Flublok Quadrivalent 3736-5585, Flucelvax PF Quadrivalent 6559-9688, Flucelvax Quadrivalent 2425-2751, FluLaval PF Quadrivalent 9669-1684, Fluzone High-Dose Quadrivalent PF 3772-9751, Fluzone PF Pediatric Quadrivalent 0649-6460, Fluzone PF Quadrivalent 2726-3033, Fluzone Quadrivalent [id number]  What is the most important information I should know about this vaccine? Influenza virus vaccine is made from \"killed viruses\" and will not cause you to become ill with the flu virus. What is influenza virus vaccine? Influenza virus (\"the flu\") is a contagious disease caused by a virus that can spread from one person to another through the air or on surfaces. Flu symptoms include fever, chills, tiredness, aches, sore throat, cough, vomiting, and diarrhea. The flu can also cause sinus infections, ear infections, bronchitis, or serious complications such as pneumonia. Influenza causes thousands of deaths each year, and hundreds of thousands of hospitalizations. Influenza is most dangerous in children, pregnant women, older adults, and people with weak immune systems or health problems such as diabetes, heart disease, or cancer. Influenza virus vaccine is for use in adults and children at least 6 months old, to prevent infection caused by influenza virus. This vaccine helps your body develop immunity to the disease, but will not treat an active infection you already have.   Influenza virus vaccine is redeveloped each year to contain specific strains of inactivated

## 2023-12-04 NOTE — PATIENT INSTRUCTIONS
these medications, you may not be able to receive the vaccine, or may need to wait until the other treatments are finished:  steroid medicine;  medications to treat psoriasis, rheumatoid arthritis, or other autoimmune disorders; or  medicines to treat or prevent organ transplant rejection. This list is not complete. Other drugs may affect influenza virus vaccine, including prescription and over-the-counter medicines, vitamins, and herbal products. Not all possible drug interactions are listed here. Where can I get more information? Your vaccination provider, pharmacist, or doctor can provide more information about this vaccine. Additional information is available from your local health department or the Centers for Disease Control and Prevention. Remember, keep this and all other medicines out of the reach of children, never share your medicines with others, and use this medication only for the indication prescribed. Every effort has been made to ensure that the information provided by [address] [place] [address] [employer] is accurate, up-to-date, and complete, but no guarantee is made to that effect. Drug information contained herein may be time sensitive. [employer] information has been compiled for use by healthcare practitioners and consumers in the [place] and therefore [employer] does not warrant that uses outside of the [place] are appropriate, unless specifically indicated otherwise. [employer] drug information does not endorse drugs, diagnose patients or recommend therapy. [employer] drug information is an informational resource designed to assist licensed healthcare practitioners in caring for their patients and/or to serve consumers viewing this service as a supplement to, and not a substitute for, the expertise, skill, knowledge and judgment of healthcare practitioners.  The absence of a warning for a given drug or drug combination in no way should be construed to indicate that the drug or drug combination

## 2024-01-17 ENCOUNTER — TELEPHONE (OUTPATIENT)
Age: 74
End: 2024-01-17

## 2024-01-17 NOTE — TELEPHONE ENCOUNTER
----- Message from Elio Nettles DO sent at 1/17/2024 10:36 AM EST -----  Please call the patient regarding their echocardiogram result.    Heart squeezing function is normal on echocardiogram, no severe valve abnormalities. Mild narrowing of aortic valve which will need to be monitored periodically.

## 2024-02-26 DIAGNOSIS — E55.9 VITAMIN D DEFICIENCY: ICD-10-CM

## 2024-02-26 DIAGNOSIS — I10 ESSENTIAL HYPERTENSION: ICD-10-CM

## 2024-02-26 DIAGNOSIS — E11.51 TYPE 2 DIABETES MELLITUS WITH PERIPHERAL CIRCULATORY DISORDER (HCC): ICD-10-CM

## 2024-02-26 DIAGNOSIS — E78.2 MIXED HYPERLIPIDEMIA: ICD-10-CM

## 2024-02-26 DIAGNOSIS — I25.118 CORONARY ARTERY DISEASE OF NATIVE ARTERY OF NATIVE HEART WITH STABLE ANGINA PECTORIS (HCC): ICD-10-CM

## 2024-02-26 DIAGNOSIS — E03.9 ACQUIRED HYPOTHYROIDISM: ICD-10-CM

## 2024-02-26 DIAGNOSIS — Z12.5 SCREENING FOR PROSTATE CANCER: ICD-10-CM

## 2024-02-26 DIAGNOSIS — N18.31 STAGE 3A CHRONIC KIDNEY DISEASE (HCC): ICD-10-CM

## 2024-02-26 LAB
25(OH)D3 SERPL-MCNC: 35 NG/ML (ref 30–100)
ALBUMIN SERPL-MCNC: 3.9 G/DL (ref 3.2–4.6)
ALBUMIN/GLOB SERPL: 1.3 (ref 0.4–1.6)
ALP SERPL-CCNC: 32 U/L (ref 50–136)
ALT SERPL-CCNC: 49 U/L (ref 12–65)
ANION GAP SERPL CALC-SCNC: 4 MMOL/L (ref 2–11)
AST SERPL-CCNC: 28 U/L (ref 15–37)
BASOPHILS # BLD: 0.1 K/UL (ref 0–0.2)
BASOPHILS NFR BLD: 1 % (ref 0–2)
BILIRUB SERPL-MCNC: 0.3 MG/DL (ref 0.2–1.1)
BUN SERPL-MCNC: 28 MG/DL (ref 8–23)
CALCIUM SERPL-MCNC: 9.6 MG/DL (ref 8.3–10.4)
CHLORIDE SERPL-SCNC: 113 MMOL/L (ref 103–113)
CHOLEST SERPL-MCNC: 152 MG/DL
CO2 SERPL-SCNC: 27 MMOL/L (ref 21–32)
CREAT SERPL-MCNC: 1.4 MG/DL (ref 0.8–1.5)
DIFFERENTIAL METHOD BLD: ABNORMAL
EOSINOPHIL # BLD: 0.2 K/UL (ref 0–0.8)
EOSINOPHIL NFR BLD: 4 % (ref 0.5–7.8)
ERYTHROCYTE [DISTWIDTH] IN BLOOD BY AUTOMATED COUNT: 13.3 % (ref 11.9–14.6)
EST. AVERAGE GLUCOSE BLD GHB EST-MCNC: 260 MG/DL
GLOBULIN SER CALC-MCNC: 3 G/DL (ref 2.8–4.5)
GLUCOSE SERPL-MCNC: 112 MG/DL (ref 65–100)
HBA1C MFR BLD: 10.7 % (ref 4.8–5.6)
HCT VFR BLD AUTO: 41.1 % (ref 41.1–50.3)
HDLC SERPL-MCNC: 38 MG/DL (ref 40–60)
HDLC SERPL: 4
HGB BLD-MCNC: 12.8 G/DL (ref 13.6–17.2)
IMM GRANULOCYTES # BLD AUTO: 0 K/UL (ref 0–0.5)
IMM GRANULOCYTES NFR BLD AUTO: 0 % (ref 0–5)
LDLC SERPL CALC-MCNC: 74.2 MG/DL
LYMPHOCYTES # BLD: 2 K/UL (ref 0.5–4.6)
LYMPHOCYTES NFR BLD: 41 % (ref 13–44)
MCH RBC QN AUTO: 28.8 PG (ref 26.1–32.9)
MCHC RBC AUTO-ENTMCNC: 31.1 G/DL (ref 31.4–35)
MCV RBC AUTO: 92.4 FL (ref 82–102)
MONOCYTES # BLD: 0.5 K/UL (ref 0.1–1.3)
MONOCYTES NFR BLD: 9 % (ref 4–12)
NEUTS SEG # BLD: 2.2 K/UL (ref 1.7–8.2)
NEUTS SEG NFR BLD: 45 % (ref 43–78)
NRBC # BLD: 0 K/UL (ref 0–0.2)
PLATELET # BLD AUTO: 186 K/UL (ref 150–450)
PMV BLD AUTO: 11.3 FL (ref 9.4–12.3)
POTASSIUM SERPL-SCNC: 4.5 MMOL/L (ref 3.5–5.1)
PROT SERPL-MCNC: 6.9 G/DL (ref 6.3–8.2)
PSA SERPL-MCNC: 0.9 NG/ML
RBC # BLD AUTO: 4.45 M/UL (ref 4.23–5.6)
SODIUM SERPL-SCNC: 144 MMOL/L (ref 136–146)
T4 FREE SERPL-MCNC: 1 NG/DL (ref 0.78–1.46)
TRIGL SERPL-MCNC: 199 MG/DL (ref 35–150)
TSH, 3RD GENERATION: 2.42 UIU/ML (ref 0.36–3.74)
VLDLC SERPL CALC-MCNC: 39.8 MG/DL (ref 6–23)
WBC # BLD AUTO: 4.9 K/UL (ref 4.3–11.1)

## 2024-03-01 NOTE — PROGRESS NOTES
Sesar Walter (:  1950) is a 73 y.o. male,Established patient, here for evaluation of the following chief complaint(s):  Diabetes (Pt is here for a 3 month diabetic f/u with lab review. ), Hypertension, Hypothyroidism, and Hyperlipidemia         ASSESSMENT/PLAN:  1. Type 2 diabetes mellitus with peripheral circulatory disorder (HCC)  -     blood glucose test strips (ACCU-CHEK RAHUL PLUS) strip; Use to check glucose tid., Disp-300 each, R-3Normal  -     Comprehensive Metabolic Panel; Future  -     Hemoglobin A1C; Future  -     Microalbumin / Creatinine Urine Ratio; Future  2. Mixed hyperlipidemia  -     omega-3 acid ethyl esters (LOVAZA) 1 g capsule; Take 2 capsules by mouth 2 times daily, Disp-360 capsule, R-3Normal  -     Comprehensive Metabolic Panel; Future  -     Lipid Panel; Future  3. Coronary artery disease of native artery of native heart with stable angina pectoris (HCC)  -     Lipid Panel; Future  4. Stage 3a chronic kidney disease (HCC)  -     Comprehensive Metabolic Panel; Future  5. Essential hypertension  -     CBC with Auto Differential; Future  -     Comprehensive Metabolic Panel; Future  6. Acquired hypothyroidism  -     TSH; Future  -     T4, Free; Future  7. Vitamin D deficiency  -     Vitamin D 25 Hydroxy; Future  8. Vitamin B12 deficiency  -     Vitamin B12; Future  -     CBC with Auto Differential; Future  9. Decreased hemoglobin  -     CBC with Auto Differential; Future      Patient Instructions   Reviewed that his diabetes did best when he was doing remodeling/more active and/or was on [employer] diet/weight was down  Will do blinded professional CGM to help identify which meals/foods/volume is responsible for glucose excursions - sensor placed today  Asked patient to keep a detailed dietary record of everything consumed over the next 10 days starting tonight with dinner  Recommend a regular exercise routine starting after CGM sensor is returned to us  Will consider trial

## 2024-03-04 ENCOUNTER — OFFICE VISIT (OUTPATIENT)
Dept: INTERNAL MEDICINE CLINIC | Facility: CLINIC | Age: 74
End: 2024-03-04
Payer: MEDICARE

## 2024-03-04 VITALS
HEART RATE: 71 BPM | WEIGHT: 189 LBS | HEIGHT: 66 IN | TEMPERATURE: 98.1 F | BODY MASS INDEX: 30.37 KG/M2 | DIASTOLIC BLOOD PRESSURE: 64 MMHG | SYSTOLIC BLOOD PRESSURE: 120 MMHG | OXYGEN SATURATION: 94 %

## 2024-03-04 DIAGNOSIS — I25.118 CORONARY ARTERY DISEASE OF NATIVE ARTERY OF NATIVE HEART WITH STABLE ANGINA PECTORIS (HCC): ICD-10-CM

## 2024-03-04 DIAGNOSIS — E55.9 VITAMIN D DEFICIENCY: ICD-10-CM

## 2024-03-04 DIAGNOSIS — E11.51 TYPE 2 DIABETES MELLITUS WITH PERIPHERAL CIRCULATORY DISORDER (HCC): Primary | ICD-10-CM

## 2024-03-04 DIAGNOSIS — E03.9 ACQUIRED HYPOTHYROIDISM: ICD-10-CM

## 2024-03-04 DIAGNOSIS — E53.8 VITAMIN B12 DEFICIENCY: ICD-10-CM

## 2024-03-04 DIAGNOSIS — E78.2 MIXED HYPERLIPIDEMIA: ICD-10-CM

## 2024-03-04 DIAGNOSIS — R71.0 DECREASED HEMOGLOBIN: ICD-10-CM

## 2024-03-04 DIAGNOSIS — N18.31 STAGE 3A CHRONIC KIDNEY DISEASE (HCC): ICD-10-CM

## 2024-03-04 DIAGNOSIS — I10 ESSENTIAL HYPERTENSION: ICD-10-CM

## 2024-03-04 PROCEDURE — 3074F SYST BP LT 130 MM HG: CPT | Performed by: PHYSICIAN ASSISTANT

## 2024-03-04 PROCEDURE — 2022F DILAT RTA XM EVC RTNOPTHY: CPT | Performed by: PHYSICIAN ASSISTANT

## 2024-03-04 PROCEDURE — G8417 CALC BMI ABV UP PARAM F/U: HCPCS | Performed by: PHYSICIAN ASSISTANT

## 2024-03-04 PROCEDURE — 3078F DIAST BP <80 MM HG: CPT | Performed by: PHYSICIAN ASSISTANT

## 2024-03-04 PROCEDURE — 1123F ACP DISCUSS/DSCN MKR DOCD: CPT | Performed by: PHYSICIAN ASSISTANT

## 2024-03-04 PROCEDURE — G8427 DOCREV CUR MEDS BY ELIG CLIN: HCPCS | Performed by: PHYSICIAN ASSISTANT

## 2024-03-04 PROCEDURE — G8484 FLU IMMUNIZE NO ADMIN: HCPCS | Performed by: PHYSICIAN ASSISTANT

## 2024-03-04 PROCEDURE — 99215 OFFICE O/P EST HI 40 MIN: CPT | Performed by: PHYSICIAN ASSISTANT

## 2024-03-04 PROCEDURE — 3046F HEMOGLOBIN A1C LEVEL >9.0%: CPT | Performed by: PHYSICIAN ASSISTANT

## 2024-03-04 PROCEDURE — 1036F TOBACCO NON-USER: CPT | Performed by: PHYSICIAN ASSISTANT

## 2024-03-04 PROCEDURE — 3017F COLORECTAL CA SCREEN DOC REV: CPT | Performed by: PHYSICIAN ASSISTANT

## 2024-03-04 RX ORDER — OMEGA-3-ACID ETHYL ESTERS 1 G/1
2 CAPSULE, LIQUID FILLED ORAL 2 TIMES DAILY
Qty: 360 CAPSULE | Refills: 3 | Status: SHIPPED | OUTPATIENT
Start: 2024-03-04 | End: 2024-03-06 | Stop reason: SDUPTHER

## 2024-03-04 RX ORDER — VALSARTAN 320 MG/1
320 TABLET ORAL DAILY
Qty: 90 TABLET | Refills: 3 | Status: SHIPPED | OUTPATIENT
Start: 2024-03-04

## 2024-03-04 RX ORDER — CHLORTHALIDONE 25 MG/1
25 TABLET ORAL DAILY
Qty: 90 TABLET | Refills: 3 | Status: CANCELLED | OUTPATIENT
Start: 2024-03-04

## 2024-03-04 RX ORDER — BLOOD SUGAR DIAGNOSTIC
STRIP MISCELLANEOUS
Qty: 300 EACH | Refills: 3 | Status: SHIPPED | OUTPATIENT
Start: 2024-03-04

## 2024-03-04 ASSESSMENT — ENCOUNTER SYMPTOMS
RECTAL PAIN: 0
BLOOD IN STOOL: 0
ANAL BLEEDING: 1

## 2024-03-04 ASSESSMENT — PATIENT HEALTH QUESTIONNAIRE - PHQ9
SUM OF ALL RESPONSES TO PHQ QUESTIONS 1-9: 0
SUM OF ALL RESPONSES TO PHQ9 QUESTIONS 1 & 2: 0
2. FEELING DOWN, DEPRESSED OR HOPELESS: 0
SUM OF ALL RESPONSES TO PHQ QUESTIONS 1-9: 0
1. LITTLE INTEREST OR PLEASURE IN DOING THINGS: 0

## 2024-03-04 NOTE — PATIENT INSTRUCTIONS
Reviewed that his diabetes did best when he was doing remodeling/more active and/or was on Optavia diet/weight was down  Will do blinded professional CGM to help identify which meals/foods/volume is responsible for glucose excursions - sensor placed today  Asked patient to keep a detailed dietary record of everything consumed over the next 10 days starting tonight with dinner  Recommend a regular exercise routine starting after CGM sensor is returned to us  Will consider trial increase of Jardiance to full 25 mg dose  Will likely establish a standing dose plan for Fiasp with sliding scale parameters if/when needed at next office visit  Priced out Lovaza for patient @ [facility] so he could compare it to [facility]  Reminded to stay well hydrated with plenty of water  Monitor blood sugar 3 times/day and keep record to bring to next appointment  Continue chronic medications as prescribed  Monitor blood pressure 1-2 times/week and keep record to bring to next appointment

## 2024-03-05 ENCOUNTER — PATIENT MESSAGE (OUTPATIENT)
Dept: INTERNAL MEDICINE CLINIC | Facility: CLINIC | Age: 74
End: 2024-03-05

## 2024-03-05 DIAGNOSIS — E78.2 MIXED HYPERLIPIDEMIA: ICD-10-CM

## 2024-03-05 NOTE — PROGRESS NOTES
Applied Dexcom G6 Pro sensor and transmitter on pt's L lower abd. Checked status of transmitter via clinic's  and received notification that connection was successful. Pt educated on Dexcom G6 and education handouts given. Pt verbalized understanding and has no further questions at this time. He will drop off transmitter after his 10 day session ends and results will be reviewed with Rafaela Vazquez PA-C at upcoming visit on 3/28/24.

## 2024-03-06 NOTE — TELEPHONE ENCOUNTER
From: Sesar Walter  To: Rafaela Vazquez  Sent: 3/5/2024 5:26 PM EST  Subject: Omega 3    My Omega 3 Rx is cheaper with [facility] . Would you send my Rx in so I can order there?  Thanks

## 2024-03-08 NOTE — PROGRESS NOTES
200 UNIT/ML SOPN Inject 80 Units into the skin every morning    Insulin Aspart, w/Niacinamide, (FIASP FLEXTOUCH) 100 UNIT/ML SOPN Inject 10 units tid before each meal plus sliding scale as needed based on pre-meal glucose. Max daily dose 50 units.    empagliflozin (JARDIANCE) 25 MG tablet Take 1 tablet by mouth daily    rosuvastatin (CRESTOR) 40 MG tablet Take 1 tablet by mouth daily    nebivolol (BYSTOLIC) 10 MG tablet Take 1 tablet by mouth daily    omeprazole (PRILOSEC) 40 MG delayed release capsule Take 1 capsule by mouth daily (before lunch)    levothyroxine (SYNTHROID) 112 MCG tablet Take 0.5 tablets by mouth every morning (before breakfast)    fenofibrate (TRIGLIDE) 160 MG tablet Take 1 tablet by mouth daily    nitroGLYCERIN (NITROSTAT) 0.4 MG SL tablet Place 1 tablet under the tongue every 5 minutes as needed for Chest pain up to max of 3 total doses. If no relief after 1 dose, call 911.    chlorthalidone (HYGROTON) 25 MG tablet Take 1 tablet by mouth daily    Cholecalciferol (D3 5000 PO) Take by mouth    Cyanocobalamin (B-12) 2500 MCG TABS Take by mouth    aspirin 81 MG EC tablet Take by mouth daily    ezetimibe (ZETIA) 10 MG tablet Take 1 tablet by mouth daily (Patient not taking: Reported on 3/11/2024)     No current facility-administered medications for this visit.           REVIEW OF SYSTEMS:   CONSTITUTIONAL:   There is no history of fever, chills, night sweats, weight loss, weight gain, persistent fatigue, or lethargy/hypersomnolence.   CARDIAC:   No chest pain, pressure, discomfort, palpitations, orthopnea, murmurs, or edema.   GI:   No dysphagia, heartburn reflux, nausea/vomiting, diarrhea, abdominal pain, or bleeding.   NEURO:   There is no history of AMS, persistent headache, decreased level of consciousness, seizures, or motor or sensory deficits.      PHYSICAL EXAM:    Vitals:    03/11/24 1343   BP: 138/83   Site: Left Upper Arm   Position: Sitting   Pulse: 62   Resp: 18   Temp: 97.5 °F (36.4

## 2024-03-10 RX ORDER — OMEGA-3-ACID ETHYL ESTERS 1 G/1
2 CAPSULE, LIQUID FILLED ORAL 2 TIMES DAILY
Qty: 360 CAPSULE | Refills: 3 | Status: SHIPPED | OUTPATIENT
Start: 2024-03-10

## 2024-03-11 ENCOUNTER — OFFICE VISIT (OUTPATIENT)
Dept: SLEEP MEDICINE | Age: 74
End: 2024-03-11
Payer: MEDICARE

## 2024-03-11 VITALS
HEIGHT: 66 IN | BODY MASS INDEX: 30.86 KG/M2 | SYSTOLIC BLOOD PRESSURE: 138 MMHG | HEART RATE: 62 BPM | DIASTOLIC BLOOD PRESSURE: 83 MMHG | WEIGHT: 192 LBS | TEMPERATURE: 97.5 F | OXYGEN SATURATION: 92 % | RESPIRATION RATE: 18 BRPM

## 2024-03-11 DIAGNOSIS — G47.33 OSA (OBSTRUCTIVE SLEEP APNEA): Primary | ICD-10-CM

## 2024-03-11 PROCEDURE — 3079F DIAST BP 80-89 MM HG: CPT | Performed by: STUDENT IN AN ORGANIZED HEALTH CARE EDUCATION/TRAINING PROGRAM

## 2024-03-11 PROCEDURE — 3017F COLORECTAL CA SCREEN DOC REV: CPT | Performed by: STUDENT IN AN ORGANIZED HEALTH CARE EDUCATION/TRAINING PROGRAM

## 2024-03-11 PROCEDURE — 1123F ACP DISCUSS/DSCN MKR DOCD: CPT | Performed by: STUDENT IN AN ORGANIZED HEALTH CARE EDUCATION/TRAINING PROGRAM

## 2024-03-11 PROCEDURE — 1036F TOBACCO NON-USER: CPT | Performed by: STUDENT IN AN ORGANIZED HEALTH CARE EDUCATION/TRAINING PROGRAM

## 2024-03-11 PROCEDURE — G8417 CALC BMI ABV UP PARAM F/U: HCPCS | Performed by: STUDENT IN AN ORGANIZED HEALTH CARE EDUCATION/TRAINING PROGRAM

## 2024-03-11 PROCEDURE — G8427 DOCREV CUR MEDS BY ELIG CLIN: HCPCS | Performed by: STUDENT IN AN ORGANIZED HEALTH CARE EDUCATION/TRAINING PROGRAM

## 2024-03-11 PROCEDURE — G8484 FLU IMMUNIZE NO ADMIN: HCPCS | Performed by: STUDENT IN AN ORGANIZED HEALTH CARE EDUCATION/TRAINING PROGRAM

## 2024-03-11 PROCEDURE — 99213 OFFICE O/P EST LOW 20 MIN: CPT | Performed by: STUDENT IN AN ORGANIZED HEALTH CARE EDUCATION/TRAINING PROGRAM

## 2024-03-11 PROCEDURE — 3075F SYST BP GE 130 - 139MM HG: CPT | Performed by: STUDENT IN AN ORGANIZED HEALTH CARE EDUCATION/TRAINING PROGRAM

## 2024-03-11 ASSESSMENT — SLEEP AND FATIGUE QUESTIONNAIRES
HOW LIKELY ARE YOU TO NOD OFF OR FALL ASLEEP WHILE SITTING AND TALKING TO SOMEONE: 0
ESS TOTAL SCORE: 2
HOW LIKELY ARE YOU TO NOD OFF OR FALL ASLEEP WHILE WATCHING TV: 0
HOW LIKELY ARE YOU TO NOD OFF OR FALL ASLEEP IN A CAR, WHILE STOPPED FOR A FEW MINUTES IN TRAFFIC: 0
HOW LIKELY ARE YOU TO NOD OFF OR FALL ASLEEP WHEN YOU ARE A PASSENGER IN A CAR FOR AN HOUR WITHOUT A BREAK: 0
HOW LIKELY ARE YOU TO NOD OFF OR FALL ASLEEP WHILE LYING DOWN TO REST IN THE AFTERNOON WHEN CIRCUMSTANCES PERMIT: 2
HOW LIKELY ARE YOU TO NOD OFF OR FALL ASLEEP WHILE SITTING QUIETLY AFTER LUNCH WITHOUT ALCOHOL: 0
HOW LIKELY ARE YOU TO NOD OFF OR FALL ASLEEP WHILE SITTING INACTIVE IN A PUBLIC PLACE: 0
HOW LIKELY ARE YOU TO NOD OFF OR FALL ASLEEP WHILE SITTING AND READING: 0

## 2024-03-26 ASSESSMENT — ENCOUNTER SYMPTOMS: SHORTNESS OF BREATH: 0

## 2024-03-26 NOTE — PROGRESS NOTES
glucose surges in the mornings when he doesn't eat breakfast with a similar but less severe surge mid day when lunch is not consumed.  He spent the majority of the time with glucose > 180.      On this date 3/27/2024 I have spent 35 minutes reviewing previous notes, test results and face to face with the patient discussing the diagnosis and importance of compliance with the treatment plan as well as documenting on the day of the visit.      An electronic signature was used to authenticate this note.    --Rafaela Vazquez PA-C

## 2024-03-27 ENCOUNTER — OFFICE VISIT (OUTPATIENT)
Dept: INTERNAL MEDICINE CLINIC | Facility: CLINIC | Age: 74
End: 2024-03-27
Payer: MEDICARE

## 2024-03-27 VITALS
HEART RATE: 59 BPM | TEMPERATURE: 97.3 F | BODY MASS INDEX: 29.73 KG/M2 | HEIGHT: 66 IN | WEIGHT: 185 LBS | SYSTOLIC BLOOD PRESSURE: 130 MMHG | OXYGEN SATURATION: 97 % | DIASTOLIC BLOOD PRESSURE: 70 MMHG

## 2024-03-27 DIAGNOSIS — Z79.4 TYPE 2 DIABETES MELLITUS WITH OTHER CIRCULATORY COMPLICATION, WITH LONG-TERM CURRENT USE OF INSULIN (HCC): Primary | ICD-10-CM

## 2024-03-27 DIAGNOSIS — E11.59 TYPE 2 DIABETES MELLITUS WITH OTHER CIRCULATORY COMPLICATION, WITH LONG-TERM CURRENT USE OF INSULIN (HCC): Primary | ICD-10-CM

## 2024-03-27 PROCEDURE — 99214 OFFICE O/P EST MOD 30 MIN: CPT | Performed by: PHYSICIAN ASSISTANT

## 2024-03-27 PROCEDURE — G8417 CALC BMI ABV UP PARAM F/U: HCPCS | Performed by: PHYSICIAN ASSISTANT

## 2024-03-27 PROCEDURE — 95250 CONT GLUC MNTR PHYS/QHP EQP: CPT | Performed by: PHYSICIAN ASSISTANT

## 2024-03-27 PROCEDURE — G8427 DOCREV CUR MEDS BY ELIG CLIN: HCPCS | Performed by: PHYSICIAN ASSISTANT

## 2024-03-27 PROCEDURE — 1123F ACP DISCUSS/DSCN MKR DOCD: CPT | Performed by: PHYSICIAN ASSISTANT

## 2024-03-27 PROCEDURE — 95251 CONT GLUC MNTR ANALYSIS I&R: CPT | Performed by: PHYSICIAN ASSISTANT

## 2024-03-27 PROCEDURE — 3017F COLORECTAL CA SCREEN DOC REV: CPT | Performed by: PHYSICIAN ASSISTANT

## 2024-03-27 PROCEDURE — 2022F DILAT RTA XM EVC RTNOPTHY: CPT | Performed by: PHYSICIAN ASSISTANT

## 2024-03-27 PROCEDURE — 1036F TOBACCO NON-USER: CPT | Performed by: PHYSICIAN ASSISTANT

## 2024-03-27 PROCEDURE — 3075F SYST BP GE 130 - 139MM HG: CPT | Performed by: PHYSICIAN ASSISTANT

## 2024-03-27 PROCEDURE — 3046F HEMOGLOBIN A1C LEVEL >9.0%: CPT | Performed by: PHYSICIAN ASSISTANT

## 2024-03-27 PROCEDURE — G8484 FLU IMMUNIZE NO ADMIN: HCPCS | Performed by: PHYSICIAN ASSISTANT

## 2024-03-27 PROCEDURE — 3078F DIAST BP <80 MM HG: CPT | Performed by: PHYSICIAN ASSISTANT

## 2024-03-27 ASSESSMENT — PATIENT HEALTH QUESTIONNAIRE - PHQ9
SUM OF ALL RESPONSES TO PHQ QUESTIONS 1-9: 0
SUM OF ALL RESPONSES TO PHQ9 QUESTIONS 1 & 2: 0
2. FEELING DOWN, DEPRESSED OR HOPELESS: NOT AT ALL
SUM OF ALL RESPONSES TO PHQ QUESTIONS 1-9: 0
1. LITTLE INTEREST OR PLEASURE IN DOING THINGS: NOT AT ALL
SUM OF ALL RESPONSES TO PHQ QUESTIONS 1-9: 0
SUM OF ALL RESPONSES TO PHQ QUESTIONS 1-9: 0

## 2024-03-27 NOTE — PATIENT INSTRUCTIONS
Increase Tresiba to 84 units once daily in the morning - suggest increasing further to 88 units once daily if fasting glucose range is consistently > 150 after 2-3 weeks  Restart Fiasp 5 units standing dose before food based meals plus additional units according to sliding scale parameters  Restrict evening snacks to protein rich foods only like nuts, cheese, hard boiled egg with occasional option of celery & peanut butter  Utilize Glucerna, Boost Glucose Control or Ensure Diabetes when not planning to eat a meal @ breakfast time or mid day  Recommend reducing portion of grits consumed in the morning slightly  Monitor glucose before each meal and once daily 2 hours after the meal and keep record to bring to next appointment  Reminded to stay well hydrated with plenty of water  Continue chronic medications as prescribed  Discussed possible benefit of adding in low dose Actos to help treat insulin resistance making insulin administered more successful and decreasing glucagon production

## 2024-05-21 DIAGNOSIS — N18.31 STAGE 3A CHRONIC KIDNEY DISEASE (HCC): ICD-10-CM

## 2024-05-21 DIAGNOSIS — E11.51 TYPE 2 DIABETES MELLITUS WITH PERIPHERAL CIRCULATORY DISORDER (HCC): ICD-10-CM

## 2024-05-21 DIAGNOSIS — I25.118 CORONARY ARTERY DISEASE OF NATIVE ARTERY OF NATIVE HEART WITH STABLE ANGINA PECTORIS (HCC): ICD-10-CM

## 2024-05-21 DIAGNOSIS — E03.9 ACQUIRED HYPOTHYROIDISM: ICD-10-CM

## 2024-05-21 DIAGNOSIS — I10 ESSENTIAL HYPERTENSION: ICD-10-CM

## 2024-05-21 DIAGNOSIS — E55.9 VITAMIN D DEFICIENCY: ICD-10-CM

## 2024-05-21 DIAGNOSIS — E53.8 VITAMIN B12 DEFICIENCY: ICD-10-CM

## 2024-05-21 DIAGNOSIS — R71.0 DECREASED HEMOGLOBIN: ICD-10-CM

## 2024-05-21 DIAGNOSIS — E78.2 MIXED HYPERLIPIDEMIA: ICD-10-CM

## 2024-05-21 LAB
25(OH)D3 SERPL-MCNC: 56.7 NG/ML (ref 30–100)
ALBUMIN SERPL-MCNC: 4 G/DL (ref 3.2–4.6)
ALBUMIN/GLOB SERPL: 1.3 (ref 1–1.9)
ALP SERPL-CCNC: 36 U/L (ref 40–129)
ALT SERPL-CCNC: 45 U/L (ref 12–65)
ANION GAP SERPL CALC-SCNC: 11 MMOL/L (ref 9–18)
AST SERPL-CCNC: 42 U/L (ref 15–37)
BASOPHILS # BLD: 0 K/UL (ref 0–0.2)
BASOPHILS NFR BLD: 1 % (ref 0–2)
BILIRUB SERPL-MCNC: 0.4 MG/DL (ref 0–1.2)
BUN SERPL-MCNC: 33 MG/DL (ref 8–23)
CALCIUM SERPL-MCNC: 10.3 MG/DL (ref 8.8–10.2)
CHLORIDE SERPL-SCNC: 100 MMOL/L (ref 98–107)
CHOLEST SERPL-MCNC: 193 MG/DL (ref 0–200)
CO2 SERPL-SCNC: 30 MMOL/L (ref 20–28)
CREAT SERPL-MCNC: 1.39 MG/DL (ref 0.8–1.3)
CREAT UR-MCNC: 88.1 MG/DL (ref 39–259)
DIFFERENTIAL METHOD BLD: ABNORMAL
EOSINOPHIL # BLD: 0.3 K/UL (ref 0–0.8)
EOSINOPHIL NFR BLD: 4 % (ref 0.5–7.8)
ERYTHROCYTE [DISTWIDTH] IN BLOOD BY AUTOMATED COUNT: 12.6 % (ref 11.9–14.6)
EST. AVERAGE GLUCOSE BLD GHB EST-MCNC: 295 MG/DL
GLOBULIN SER CALC-MCNC: 3 G/DL (ref 2.3–3.5)
GLUCOSE SERPL-MCNC: 171 MG/DL (ref 70–99)
HBA1C MFR BLD: 11.9 % (ref 0–5.6)
HCT VFR BLD AUTO: 42.2 % (ref 41.1–50.3)
HDLC SERPL-MCNC: 29 MG/DL (ref 40–60)
HDLC SERPL: 6.7 (ref 0–5)
HGB BLD-MCNC: 13.4 G/DL (ref 13.6–17.2)
IMM GRANULOCYTES # BLD AUTO: 0.1 K/UL (ref 0–0.5)
IMM GRANULOCYTES NFR BLD AUTO: 1 % (ref 0–5)
LDLC SERPL CALC-MCNC: ABNORMAL MG/DL (ref 0–100)
LDLC SERPL DIRECT ASSAY-MCNC: 73 MG/DL (ref 0–100)
LYMPHOCYTES # BLD: 2.3 K/UL (ref 0.5–4.6)
MCH RBC QN AUTO: 29.1 PG (ref 26.1–32.9)
MCHC RBC AUTO-ENTMCNC: 31.8 G/DL (ref 31.4–35)
MCV RBC AUTO: 91.5 FL (ref 82–102)
MICROALBUMIN UR-MCNC: <1.2 MG/DL (ref 0–20)
MICROALBUMIN/CREAT UR-RTO: NORMAL MG/G (ref 0–30)
MONOCYTES # BLD: 0.7 K/UL (ref 0.1–1.3)
MONOCYTES NFR BLD: 10 % (ref 4–12)
NEUTS SEG # BLD: 3.5 K/UL (ref 1.7–8.2)
NEUTS SEG NFR BLD: 50 % (ref 43–78)
NRBC # BLD: 0 K/UL (ref 0–0.2)
PLATELET # BLD AUTO: 199 K/UL (ref 150–450)
PMV BLD AUTO: 11.3 FL (ref 9.4–12.3)
POTASSIUM SERPL-SCNC: 4.5 MMOL/L (ref 3.5–5.1)
PROT SERPL-MCNC: 6.9 G/DL (ref 6.3–8.2)
RBC # BLD AUTO: 4.61 M/UL (ref 4.23–5.6)
SODIUM SERPL-SCNC: 141 MMOL/L (ref 136–145)
T4 FREE SERPL-MCNC: 1.4 NG/DL (ref 0.9–1.7)
TRIGL SERPL-MCNC: 457 MG/DL (ref 0–150)
TSH, 3RD GENERATION: 2.15 UIU/ML (ref 0.27–4.2)
VIT B12 SERPL-MCNC: >4000 PG/ML (ref 193–986)
VLDLC SERPL CALC-MCNC: 91 MG/DL (ref 6–23)
WBC # BLD AUTO: 6.8 K/UL (ref 4.3–11.1)

## 2024-05-24 NOTE — PROGRESS NOTES
Sesar Walter (:  1950) is a 73 y.o. male,Established patient, here for evaluation of the following chief complaint(s):  Diabetes (Pt is here for a 2 month diabetic f/u with lab review.), Hypertension, Hypothyroidism, and Hyperlipidemia      Assessment & Plan   1. Type 2 diabetes mellitus with peripheral circulatory disorder (HCC)  -     Comprehensive Metabolic Panel; Future  -     Hemoglobin A1C; Future  2. Mixed hyperlipidemia  -     fenofibrate (TRIGLIDE) 160 MG tablet; Take 1 tablet by mouth daily, Disp-90 tablet, R-3Normal  -     Comprehensive Metabolic Panel; Future  -     Lipid Panel; Future  3. Nausea  -     CT ABDOMEN PELVIS W IV CONTRAST Additional Contrast? Radiologist Recommendation; Future  4. Abdominal bloating  -     CT ABDOMEN PELVIS W IV CONTRAST Additional Contrast? Radiologist Recommendation; Future  5. Change in bowel habits  -     CT ABDOMEN PELVIS W IV CONTRAST Additional Contrast? Radiologist Recommendation; Future  6. Belching  -     CT ABDOMEN PELVIS W IV CONTRAST Additional Contrast? Radiologist Recommendation; Future  7. Mild anemia  -     CBC with Auto Differential; Future  8. Stage 3a chronic kidney disease (HCC)  -     Comprehensive Metabolic Panel; Future  9. Vitamin D deficiency  -     Vitamin D 25 Hydroxy; Future  10. Acquired hypothyroidism  -     TSH; Future  11. Coronary artery disease of native artery of native heart with stable angina pectoris (HCC)  12. Essential hypertension  -     CBC with Auto Differential; Future  -     Comprehensive Metabolic Panel; Future  13. Vitamin B12 deficiency  -     Vitamin B12; Future  14. Elevated AST (SGOT)  -     Comprehensive Metabolic Panel; Future  15. Electrolyte disturbance  Comments:  Slight Elevation in Calcium  Orders:  -     Comprehensive Metabolic Panel; Future      Patient Instructions   Trial hold of Lovaza (Omega-3 Ethyl Esters) to see if GI symptoms improve any  Increase Jardiance to 25 mg daily (full tablet)

## 2024-05-28 ENCOUNTER — OFFICE VISIT (OUTPATIENT)
Dept: INTERNAL MEDICINE CLINIC | Facility: CLINIC | Age: 74
End: 2024-05-28
Payer: MEDICARE

## 2024-05-28 DIAGNOSIS — E53.8 VITAMIN B12 DEFICIENCY: ICD-10-CM

## 2024-05-28 DIAGNOSIS — E78.2 MIXED HYPERLIPIDEMIA: ICD-10-CM

## 2024-05-28 DIAGNOSIS — R74.01 ELEVATED AST (SGOT): ICD-10-CM

## 2024-05-28 DIAGNOSIS — R11.0 NAUSEA: ICD-10-CM

## 2024-05-28 DIAGNOSIS — R19.4 CHANGE IN BOWEL HABITS: ICD-10-CM

## 2024-05-28 DIAGNOSIS — I10 ESSENTIAL HYPERTENSION: ICD-10-CM

## 2024-05-28 DIAGNOSIS — E11.51 TYPE 2 DIABETES MELLITUS WITH PERIPHERAL CIRCULATORY DISORDER (HCC): Primary | ICD-10-CM

## 2024-05-28 DIAGNOSIS — E87.8 ELECTROLYTE DISTURBANCE: ICD-10-CM

## 2024-05-28 DIAGNOSIS — N18.31 STAGE 3A CHRONIC KIDNEY DISEASE (HCC): ICD-10-CM

## 2024-05-28 DIAGNOSIS — E55.9 VITAMIN D DEFICIENCY: ICD-10-CM

## 2024-05-28 DIAGNOSIS — R14.2 BELCHING: ICD-10-CM

## 2024-05-28 DIAGNOSIS — R14.0 ABDOMINAL BLOATING: ICD-10-CM

## 2024-05-28 DIAGNOSIS — I25.118 CORONARY ARTERY DISEASE OF NATIVE ARTERY OF NATIVE HEART WITH STABLE ANGINA PECTORIS (HCC): ICD-10-CM

## 2024-05-28 DIAGNOSIS — E03.9 ACQUIRED HYPOTHYROIDISM: ICD-10-CM

## 2024-05-28 DIAGNOSIS — D64.9 MILD ANEMIA: ICD-10-CM

## 2024-05-28 PROCEDURE — G8427 DOCREV CUR MEDS BY ELIG CLIN: HCPCS | Performed by: PHYSICIAN ASSISTANT

## 2024-05-28 PROCEDURE — 2022F DILAT RTA XM EVC RTNOPTHY: CPT | Performed by: PHYSICIAN ASSISTANT

## 2024-05-28 PROCEDURE — G8417 CALC BMI ABV UP PARAM F/U: HCPCS | Performed by: PHYSICIAN ASSISTANT

## 2024-05-28 PROCEDURE — 3078F DIAST BP <80 MM HG: CPT | Performed by: PHYSICIAN ASSISTANT

## 2024-05-28 PROCEDURE — 1123F ACP DISCUSS/DSCN MKR DOCD: CPT | Performed by: PHYSICIAN ASSISTANT

## 2024-05-28 PROCEDURE — 3075F SYST BP GE 130 - 139MM HG: CPT | Performed by: PHYSICIAN ASSISTANT

## 2024-05-28 PROCEDURE — 3046F HEMOGLOBIN A1C LEVEL >9.0%: CPT | Performed by: PHYSICIAN ASSISTANT

## 2024-05-28 PROCEDURE — 99215 OFFICE O/P EST HI 40 MIN: CPT | Performed by: PHYSICIAN ASSISTANT

## 2024-05-28 PROCEDURE — 1036F TOBACCO NON-USER: CPT | Performed by: PHYSICIAN ASSISTANT

## 2024-05-28 PROCEDURE — 3017F COLORECTAL CA SCREEN DOC REV: CPT | Performed by: PHYSICIAN ASSISTANT

## 2024-05-28 RX ORDER — EZETIMIBE 10 MG/1
10 TABLET ORAL DAILY
Qty: 90 TABLET | Refills: 3 | Status: SHIPPED | OUTPATIENT
Start: 2024-05-28

## 2024-05-28 RX ORDER — CHLORTHALIDONE 25 MG/1
25 TABLET ORAL DAILY
Qty: 90 TABLET | Refills: 3 | Status: SHIPPED | OUTPATIENT
Start: 2024-05-28

## 2024-05-28 RX ORDER — FENOFIBRATE 160 MG/1
160 TABLET ORAL DAILY
Qty: 90 TABLET | Refills: 3 | Status: SHIPPED | OUTPATIENT
Start: 2024-05-28

## 2024-05-28 SDOH — ECONOMIC STABILITY: HOUSING INSECURITY
IN THE LAST 12 MONTHS, WAS THERE A TIME WHEN YOU DID NOT HAVE A STEADY PLACE TO SLEEP OR SLEPT IN A SHELTER (INCLUDING NOW)?: NO

## 2024-05-28 SDOH — ECONOMIC STABILITY: FOOD INSECURITY: WITHIN THE PAST 12 MONTHS, YOU WORRIED THAT YOUR FOOD WOULD RUN OUT BEFORE YOU GOT MONEY TO BUY MORE.: NEVER TRUE

## 2024-05-28 SDOH — ECONOMIC STABILITY: FOOD INSECURITY: WITHIN THE PAST 12 MONTHS, THE FOOD YOU BOUGHT JUST DIDN'T LAST AND YOU DIDN'T HAVE MONEY TO GET MORE.: NEVER TRUE

## 2024-05-28 SDOH — ECONOMIC STABILITY: INCOME INSECURITY: HOW HARD IS IT FOR YOU TO PAY FOR THE VERY BASICS LIKE FOOD, HOUSING, MEDICAL CARE, AND HEATING?: NOT HARD AT ALL

## 2024-05-28 ASSESSMENT — PATIENT HEALTH QUESTIONNAIRE - PHQ9
SUM OF ALL RESPONSES TO PHQ QUESTIONS 1-9: 2
SUM OF ALL RESPONSES TO PHQ QUESTIONS 1-9: 2
1. LITTLE INTEREST OR PLEASURE IN DOING THINGS: SEVERAL DAYS
SUM OF ALL RESPONSES TO PHQ9 QUESTIONS 1 & 2: 2
SUM OF ALL RESPONSES TO PHQ QUESTIONS 1-9: 2
SUM OF ALL RESPONSES TO PHQ QUESTIONS 1-9: 2
2. FEELING DOWN, DEPRESSED OR HOPELESS: SEVERAL DAYS

## 2024-05-28 ASSESSMENT — ENCOUNTER SYMPTOMS
ABDOMINAL PAIN: 1
NAUSEA: 1

## 2024-05-28 NOTE — PATIENT INSTRUCTIONS
Trial hold of Lovaza (Omega-3 Ethyl Esters) to see if GI symptoms improve any  Increase Jardiance to 25 mg daily (full tablet)   Reminded to stay well hydrated with plenty of water  Monitor blood sugar 3 times/day and keep record to bring to next appointment  Will consider adding Actos 15 mg daily to treat insulin resistance  Will consider starting Reglan for gastroparesis if no other source of GI symptoms is found  Discussed additional studies that may be needed including abdominal ultrasound to evaluate gallbladder, HIDA scan to measure gallbladder function, and /or gastric emptying test to evaluate for gastroparesis  Monitor blood pressure 2-4 times/month and keep record to bring to next appointment  Will plan to add on ferritin, iron & TIBC to blood work done in August if hemoglobin is still deficient + occult stool cards x 3

## 2024-05-30 ENCOUNTER — OFFICE VISIT (OUTPATIENT)
Age: 74
End: 2024-05-30
Payer: MEDICARE

## 2024-05-30 VITALS
HEIGHT: 66 IN | SYSTOLIC BLOOD PRESSURE: 114 MMHG | WEIGHT: 176 LBS | HEART RATE: 60 BPM | DIASTOLIC BLOOD PRESSURE: 70 MMHG | BODY MASS INDEX: 28.28 KG/M2

## 2024-05-30 VITALS
HEIGHT: 66 IN | BODY MASS INDEX: 30.05 KG/M2 | HEART RATE: 57 BPM | OXYGEN SATURATION: 97 % | DIASTOLIC BLOOD PRESSURE: 72 MMHG | WEIGHT: 187 LBS | SYSTOLIC BLOOD PRESSURE: 136 MMHG | TEMPERATURE: 97.4 F

## 2024-05-30 DIAGNOSIS — I25.10 CORONARY ARTERY DISEASE INVOLVING NATIVE CORONARY ARTERY OF NATIVE HEART WITHOUT ANGINA PECTORIS: Primary | ICD-10-CM

## 2024-05-30 DIAGNOSIS — Z95.820 S/P ANGIOPLASTY WITH STENT: ICD-10-CM

## 2024-05-30 DIAGNOSIS — I10 ESSENTIAL HYPERTENSION: ICD-10-CM

## 2024-05-30 DIAGNOSIS — I35.0 MILD AORTIC STENOSIS: ICD-10-CM

## 2024-05-30 DIAGNOSIS — E78.2 MIXED HYPERLIPIDEMIA: ICD-10-CM

## 2024-05-30 PROCEDURE — 3078F DIAST BP <80 MM HG: CPT | Performed by: INTERNAL MEDICINE

## 2024-05-30 PROCEDURE — 3017F COLORECTAL CA SCREEN DOC REV: CPT | Performed by: INTERNAL MEDICINE

## 2024-05-30 PROCEDURE — 1123F ACP DISCUSS/DSCN MKR DOCD: CPT | Performed by: INTERNAL MEDICINE

## 2024-05-30 PROCEDURE — G8428 CUR MEDS NOT DOCUMENT: HCPCS | Performed by: INTERNAL MEDICINE

## 2024-05-30 PROCEDURE — 99214 OFFICE O/P EST MOD 30 MIN: CPT | Performed by: INTERNAL MEDICINE

## 2024-05-30 PROCEDURE — 3074F SYST BP LT 130 MM HG: CPT | Performed by: INTERNAL MEDICINE

## 2024-05-30 PROCEDURE — 1036F TOBACCO NON-USER: CPT | Performed by: INTERNAL MEDICINE

## 2024-05-30 PROCEDURE — G8417 CALC BMI ABV UP PARAM F/U: HCPCS | Performed by: INTERNAL MEDICINE

## 2024-05-30 ASSESSMENT — ENCOUNTER SYMPTOMS
SHORTNESS OF BREATH: 0
VOMITING: 1
BLOATING: 1
RESPIRATORY NEGATIVE: 1
COUGH: 0
NAUSEA: 1

## 2024-05-30 NOTE — PROGRESS NOTES
has been instructed and agrees to call our office with any issues or other concerns related to their cardiac condition(s) and/or complaint(s).    No follow-ups on file.    Elio Nettles DO  5/30/2024 10:20 AM

## 2024-06-03 ENCOUNTER — TELEPHONE (OUTPATIENT)
Dept: INTERNAL MEDICINE CLINIC | Facility: CLINIC | Age: 74
End: 2024-06-03

## 2024-06-03 NOTE — TELEPHONE ENCOUNTER
----- Message from Rafaela Vazquez PA-C sent at 5/31/2024  7:50 AM EDT -----  Please give him a 3-4 weeks of Jardiance 25 mg if we got that dose yesterday.

## 2024-06-03 NOTE — TELEPHONE ENCOUNTER
Pt notified that 4 boxes of Jardiance 25 mg samples have been set aside with his name and  on them for him to pick-up at his convenience. Pt verbalized understanding.

## 2024-06-06 ENCOUNTER — HOSPITAL ENCOUNTER (OUTPATIENT)
Dept: CT IMAGING | Age: 74
Discharge: HOME | End: 2024-06-06
Payer: MEDICARE

## 2024-06-06 DIAGNOSIS — R14.2 BELCHING: ICD-10-CM

## 2024-06-06 DIAGNOSIS — R19.4 CHANGE IN BOWEL HABITS: ICD-10-CM

## 2024-06-06 DIAGNOSIS — E11.51 TYPE 2 DIABETES MELLITUS WITH PERIPHERAL CIRCULATORY DISORDER (HCC): ICD-10-CM

## 2024-06-06 DIAGNOSIS — R11.0 NAUSEA: Primary | ICD-10-CM

## 2024-06-06 DIAGNOSIS — R11.0 NAUSEA: ICD-10-CM

## 2024-06-06 DIAGNOSIS — R14.0 ABDOMINAL BLOATING: ICD-10-CM

## 2024-06-06 PROCEDURE — 6360000004 HC RX CONTRAST MEDICATION: Performed by: PHYSICIAN ASSISTANT

## 2024-06-06 PROCEDURE — 74177 CT ABD & PELVIS W/CONTRAST: CPT | Performed by: RADIOLOGY

## 2024-06-06 PROCEDURE — 74177 CT ABD & PELVIS W/CONTRAST: CPT

## 2024-06-06 RX ADMIN — DIATRIZOATE MEGLUMINE AND DIATRIZOATE SODIUM 15 ML: 660; 100 LIQUID ORAL; RECTAL at 10:06

## 2024-06-06 RX ADMIN — IOPAMIDOL 100 ML: 755 INJECTION, SOLUTION INTRAVENOUS at 10:05

## 2024-06-06 NOTE — RESULT ENCOUNTER NOTE
CT scan did not reveal anything we didn't already know - he has diverticulosis but no active infection and coronary artery disease.  Have his symptoms improved at all with the trial discontinuation of Lovaza?  If not, I'm going to schedule him for a gastric emptying study to look for gastroparesis.

## 2024-07-01 ENCOUNTER — HOSPITAL ENCOUNTER (OUTPATIENT)
Dept: NUCLEAR MEDICINE | Age: 74
Discharge: HOME | End: 2024-07-04
Payer: MEDICARE

## 2024-07-01 DIAGNOSIS — R14.0 ABDOMINAL BLOATING: ICD-10-CM

## 2024-07-01 DIAGNOSIS — E11.51 TYPE 2 DIABETES MELLITUS WITH PERIPHERAL CIRCULATORY DISORDER (HCC): ICD-10-CM

## 2024-07-01 DIAGNOSIS — R14.2 BELCHING: ICD-10-CM

## 2024-07-01 DIAGNOSIS — R11.0 NAUSEA: ICD-10-CM

## 2024-07-01 PROCEDURE — A9541 TC99M SULFUR COLLOID: HCPCS | Performed by: PHYSICIAN ASSISTANT

## 2024-07-01 PROCEDURE — 3430000000 HC RX DIAGNOSTIC RADIOPHARMACEUTICAL: Performed by: PHYSICIAN ASSISTANT

## 2024-07-01 PROCEDURE — 78264 GASTRIC EMPTYING IMG STUDY: CPT

## 2024-07-01 RX ADMIN — Medication 1 MILLICURIE: at 07:25

## 2024-07-18 DIAGNOSIS — R14.0 ABDOMINAL BLOATING: Primary | ICD-10-CM

## 2024-07-18 DIAGNOSIS — R11.0 NAUSEA: ICD-10-CM

## 2024-07-18 DIAGNOSIS — R14.2 BELCHING: ICD-10-CM

## 2024-07-29 ENCOUNTER — HOSPITAL ENCOUNTER (OUTPATIENT)
Dept: NUCLEAR MEDICINE | Age: 74
Discharge: HOME | End: 2024-08-01
Payer: MEDICARE

## 2024-07-29 DIAGNOSIS — R14.0 ABDOMINAL BLOATING: ICD-10-CM

## 2024-07-29 DIAGNOSIS — R11.0 NAUSEA: ICD-10-CM

## 2024-07-29 DIAGNOSIS — R14.2 BELCHING: ICD-10-CM

## 2024-07-29 PROCEDURE — A9537 TC99M MEBROFENIN: HCPCS | Performed by: PHYSICIAN ASSISTANT

## 2024-07-29 PROCEDURE — 3430000000 HC RX DIAGNOSTIC RADIOPHARMACEUTICAL: Performed by: PHYSICIAN ASSISTANT

## 2024-07-29 PROCEDURE — 6360000004 HC RX CONTRAST MEDICATION: Performed by: PHYSICIAN ASSISTANT

## 2024-07-29 PROCEDURE — 78227 HEPATOBIL SYST IMAGE W/DRUG: CPT

## 2024-07-29 PROCEDURE — 2580000003 HC RX 258: Performed by: PHYSICIAN ASSISTANT

## 2024-07-29 RX ORDER — SODIUM CHLORIDE 0.9 % (FLUSH) 0.9 %
10 SYRINGE (ML) INJECTION ONCE AS NEEDED
Status: COMPLETED | OUTPATIENT
Start: 2024-07-29 | End: 2024-07-29

## 2024-07-29 RX ORDER — KIT FOR THE PREPARATION OF TECHNETIUM TC 99M MEBROFENIN 45 MG/10ML
6.2 INJECTION, POWDER, LYOPHILIZED, FOR SOLUTION INTRAVENOUS
Status: COMPLETED | OUTPATIENT
Start: 2024-07-29 | End: 2024-07-29

## 2024-07-29 RX ORDER — SINCALIDE 5 UG/5ML
1.6 INJECTION, POWDER, LYOPHILIZED, FOR SOLUTION INTRAVENOUS ONCE
Status: COMPLETED | OUTPATIENT
Start: 2024-07-29 | End: 2024-07-29

## 2024-07-29 RX ADMIN — MEBROFENIN 6.2 MILLICURIE: 45 INJECTION, POWDER, LYOPHILIZED, FOR SOLUTION INTRAVENOUS at 11:00

## 2024-07-29 RX ADMIN — SODIUM CHLORIDE, PRESERVATIVE FREE 10 ML: 5 INJECTION INTRAVENOUS at 11:00

## 2024-07-29 RX ADMIN — SINCALIDE 1.6 MCG: 5 INJECTION, POWDER, LYOPHILIZED, FOR SOLUTION INTRAVENOUS at 11:35

## 2024-07-29 NOTE — RESULT ENCOUNTER NOTE
HIDA scan is also clear - normal gallbladder function and no evidence of gallbladder or bile duct obstruction.

## 2024-08-21 ENCOUNTER — HOSPITAL ENCOUNTER (OUTPATIENT)
Dept: LAB | Age: 74
Discharge: HOME | End: 2024-08-24

## 2024-08-21 DIAGNOSIS — E78.2 MIXED HYPERLIPIDEMIA: ICD-10-CM

## 2024-08-21 DIAGNOSIS — E11.51 TYPE 2 DIABETES MELLITUS WITH PERIPHERAL CIRCULATORY DISORDER (HCC): ICD-10-CM

## 2024-08-21 DIAGNOSIS — D64.9 MILD ANEMIA: ICD-10-CM

## 2024-08-21 DIAGNOSIS — I10 ESSENTIAL HYPERTENSION: ICD-10-CM

## 2024-08-21 DIAGNOSIS — R74.01 ELEVATED AST (SGOT): ICD-10-CM

## 2024-08-21 DIAGNOSIS — N18.31 STAGE 3A CHRONIC KIDNEY DISEASE (HCC): ICD-10-CM

## 2024-08-21 DIAGNOSIS — E87.8 ELECTROLYTE DISTURBANCE: ICD-10-CM

## 2024-08-21 DIAGNOSIS — E03.9 ACQUIRED HYPOTHYROIDISM: ICD-10-CM

## 2024-08-21 LAB
ALBUMIN SERPL-MCNC: 3.7 G/DL (ref 3.2–4.6)
ALBUMIN/GLOB SERPL: 1.2 (ref 1–1.9)
ALP SERPL-CCNC: 38 U/L (ref 40–129)
ALT SERPL-CCNC: 40 U/L (ref 12–65)
ANION GAP SERPL CALC-SCNC: 13 MMOL/L (ref 9–18)
AST SERPL-CCNC: 37 U/L (ref 15–37)
BASOPHILS # BLD: 0.1 K/UL (ref 0–0.2)
BASOPHILS NFR BLD: 1 % (ref 0–2)
BILIRUB SERPL-MCNC: 0.3 MG/DL (ref 0–1.2)
BUN SERPL-MCNC: 60 MG/DL (ref 8–23)
CALCIUM SERPL-MCNC: 9.7 MG/DL (ref 8.8–10.2)
CHLORIDE SERPL-SCNC: 99 MMOL/L (ref 98–107)
CHOLEST SERPL-MCNC: 128 MG/DL (ref 0–200)
CO2 SERPL-SCNC: 23 MMOL/L (ref 20–28)
CREAT SERPL-MCNC: 2.07 MG/DL (ref 0.8–1.3)
DIFFERENTIAL METHOD BLD: NORMAL
EOSINOPHIL # BLD: 0.2 K/UL (ref 0–0.8)
EOSINOPHIL NFR BLD: 3 % (ref 0.5–7.8)
ERYTHROCYTE [DISTWIDTH] IN BLOOD BY AUTOMATED COUNT: 13 % (ref 11.9–14.6)
EST. AVERAGE GLUCOSE BLD GHB EST-MCNC: 272 MG/DL
GLOBULIN SER CALC-MCNC: 3 G/DL (ref 2.3–3.5)
GLUCOSE SERPL-MCNC: 224 MG/DL (ref 70–99)
HBA1C MFR BLD: 11.1 % (ref 0–5.6)
HCT VFR BLD AUTO: 43.1 % (ref 41.1–50.3)
HDLC SERPL-MCNC: 29 MG/DL (ref 40–60)
HDLC SERPL: 4.4 (ref 0–5)
HGB BLD-MCNC: 13.9 G/DL (ref 13.6–17.2)
IMM GRANULOCYTES # BLD AUTO: 0.1 K/UL (ref 0–0.5)
IMM GRANULOCYTES NFR BLD AUTO: 1 % (ref 0–5)
LDLC SERPL CALC-MCNC: 31 MG/DL (ref 0–100)
LYMPHOCYTES # BLD: 2.9 K/UL (ref 0.5–4.6)
LYMPHOCYTES NFR BLD: 36 % (ref 13–44)
MCH RBC QN AUTO: 29.7 PG (ref 26.1–32.9)
MCHC RBC AUTO-ENTMCNC: 32.3 G/DL (ref 31.4–35)
MCV RBC AUTO: 92.1 FL (ref 82–102)
MONOCYTES # BLD: 0.7 K/UL (ref 0.1–1.3)
MONOCYTES NFR BLD: 9 % (ref 4–12)
NEUTS SEG # BLD: 4.1 K/UL (ref 1.7–8.2)
NEUTS SEG NFR BLD: 50 % (ref 43–78)
NRBC # BLD: 0 K/UL (ref 0–0.2)
PLATELET # BLD AUTO: 200 K/UL (ref 150–450)
PMV BLD AUTO: 11.2 FL (ref 9.4–12.3)
POTASSIUM SERPL-SCNC: 5.3 MMOL/L (ref 3.5–5.1)
PROT SERPL-MCNC: 6.8 G/DL (ref 6.3–8.2)
RBC # BLD AUTO: 4.68 M/UL (ref 4.23–5.6)
SODIUM SERPL-SCNC: 135 MMOL/L (ref 136–145)
TRIGL SERPL-MCNC: 340 MG/DL (ref 0–150)
TSH, 3RD GENERATION: 3.3 UIU/ML (ref 0.27–4.2)
VLDLC SERPL CALC-MCNC: 68 MG/DL (ref 6–23)
WBC # BLD AUTO: 8.1 K/UL (ref 4.3–11.1)

## 2024-08-26 ENCOUNTER — TELEPHONE (OUTPATIENT)
Dept: INTERNAL MEDICINE CLINIC | Facility: CLINIC | Age: 74
End: 2024-08-26

## 2024-08-26 NOTE — TELEPHONE ENCOUNTER
Pt passed today and needs amparo to sign off on death certificate. Coroners contact information is in the encounter.

## 2024-08-27 ENCOUNTER — TELEPHONE (OUTPATIENT)
Dept: INTERNAL MEDICINE CLINIC | Facility: CLINIC | Age: 74
End: 2024-08-27

## 2024-08-27 NOTE — TELEPHONE ENCOUNTER
Rohith Alvares from [facility]   [phone] states Rafaela Vazquez's name is not on the [employer].  So she cannot sign the death certificate    She would have to call [facility] and get password access to the portal    Or Dr. Cobos can sign it electronically since It is under his license.    This has to be done  in 48 hrs

## 2024-08-27 NOTE — TELEPHONE ENCOUNTER
Spoke with  who explained that wife went to work yesterday morning and patient had been feeling bad x 2 days with generalized fatigue but had not sought any care or done home COVID testing.  When she returned home from work several hours later she found him unresponsive on the bed holding nitroglycerin bottle in his hand.  911 was called, patient was instructed to start CPR, and EMS was dispatched with death pronounced upon arrival.

## (undated) DEVICE — NDL PRT INJ NSAF BLNT 18GX1.5 --

## (undated) DEVICE — SYR 5ML 1/5 GRAD LL NSAF LF --

## (undated) DEVICE — CANNULA NSL ORAL AD FOR CAPNOFLEX CO2 O2 AIRLFE

## (undated) DEVICE — CONNECTOR TBNG OD5-7MM O2 END DISP

## (undated) DEVICE — SYR 3ML LL TIP 1/10ML GRAD --

## (undated) DEVICE — KENDALL RADIOLUCENT FOAM MONITORING ELECTRODE RECTANGULAR SHAPE: Brand: KENDALL